# Patient Record
Sex: MALE | Race: WHITE | Employment: FULL TIME | URBAN - METROPOLITAN AREA
[De-identification: names, ages, dates, MRNs, and addresses within clinical notes are randomized per-mention and may not be internally consistent; named-entity substitution may affect disease eponyms.]

---

## 2017-02-02 ENCOUNTER — TELEPHONE (OUTPATIENT)
Dept: FAMILY MEDICINE CLINIC | Age: 42
End: 2017-02-02

## 2017-02-02 ENCOUNTER — OFFICE VISIT (OUTPATIENT)
Dept: FAMILY MEDICINE CLINIC | Age: 42
End: 2017-02-02

## 2017-02-02 VITALS
DIASTOLIC BLOOD PRESSURE: 91 MMHG | TEMPERATURE: 98.1 F | WEIGHT: 305 LBS | RESPIRATION RATE: 16 BRPM | OXYGEN SATURATION: 98 % | HEIGHT: 68 IN | SYSTOLIC BLOOD PRESSURE: 161 MMHG | BODY MASS INDEX: 46.23 KG/M2 | HEART RATE: 92 BPM

## 2017-02-02 DIAGNOSIS — R06.83 SNORING: ICD-10-CM

## 2017-02-02 DIAGNOSIS — I10 ESSENTIAL HYPERTENSION: Primary | ICD-10-CM

## 2017-02-02 DIAGNOSIS — Z13.220 SCREENING FOR HYPERLIPIDEMIA: ICD-10-CM

## 2017-02-02 DIAGNOSIS — Z13.6 SCREENING FOR HYPERTENSION: ICD-10-CM

## 2017-02-02 DIAGNOSIS — J30.9 ALLERGIC SINUSITIS: ICD-10-CM

## 2017-02-02 DIAGNOSIS — E66.01 OBESITY, MORBID, BMI 40.0-49.9 (HCC): ICD-10-CM

## 2017-02-02 DIAGNOSIS — E11.65 TYPE 2 DIABETES MELLITUS WITH HYPERGLYCEMIA, WITHOUT LONG-TERM CURRENT USE OF INSULIN (HCC): ICD-10-CM

## 2017-02-02 DIAGNOSIS — R35.0 URINARY FREQUENCY: ICD-10-CM

## 2017-02-02 DIAGNOSIS — Z13.1 SCREENING FOR DIABETES MELLITUS: ICD-10-CM

## 2017-02-02 LAB — GLUCOSE POC: 210 MG/DL

## 2017-02-02 RX ORDER — AMLODIPINE BESYLATE 5 MG/1
5 TABLET ORAL DAILY
Qty: 30 TAB | Refills: 3 | Status: SHIPPED | OUTPATIENT
Start: 2017-02-02 | End: 2017-02-02 | Stop reason: SDUPTHER

## 2017-02-02 RX ORDER — FLUTICASONE PROPIONATE 50 MCG
2 SPRAY, SUSPENSION (ML) NASAL DAILY
Qty: 1 BOTTLE | Refills: 3 | Status: SHIPPED | OUTPATIENT
Start: 2017-02-02 | End: 2017-02-03 | Stop reason: SDUPTHER

## 2017-02-02 RX ORDER — FLUTICASONE PROPIONATE 50 MCG
2 SPRAY, SUSPENSION (ML) NASAL DAILY
Qty: 1 BOTTLE | Refills: 3 | Status: SHIPPED | OUTPATIENT
Start: 2017-02-02 | End: 2017-02-02 | Stop reason: SDUPTHER

## 2017-02-02 RX ORDER — AMLODIPINE BESYLATE 5 MG/1
5 TABLET ORAL DAILY
Qty: 30 TAB | Refills: 3 | Status: SHIPPED | OUTPATIENT
Start: 2017-02-02 | End: 2017-02-03 | Stop reason: SDUPTHER

## 2017-02-02 RX ORDER — LANCETS
EACH MISCELLANEOUS
Qty: 100 EACH | Refills: 3 | Status: SHIPPED | OUTPATIENT
Start: 2017-02-02 | End: 2017-02-03 | Stop reason: SDUPTHER

## 2017-02-02 RX ORDER — INSULIN PUMP SYRINGE, 3 ML
EACH MISCELLANEOUS
Qty: 1 KIT | Refills: 0 | Status: SHIPPED | OUTPATIENT
Start: 2017-02-02 | End: 2017-02-03 | Stop reason: SDUPTHER

## 2017-02-02 RX ORDER — METFORMIN HYDROCHLORIDE 500 MG/1
500 TABLET ORAL 2 TIMES DAILY WITH MEALS
Qty: 60 TAB | Refills: 6 | Status: SHIPPED | OUTPATIENT
Start: 2017-02-02 | End: 2017-02-03 | Stop reason: SDUPTHER

## 2017-02-02 RX ORDER — TOPIRAMATE 25 MG/1
25 TABLET ORAL
Qty: 30 TAB | Refills: 3 | Status: SHIPPED | OUTPATIENT
Start: 2017-02-02 | End: 2017-02-03 | Stop reason: SDUPTHER

## 2017-02-02 RX ORDER — TOPIRAMATE 25 MG/1
25 TABLET ORAL
Qty: 30 TAB | Refills: 3 | Status: SHIPPED | OUTPATIENT
Start: 2017-02-02 | End: 2017-02-02 | Stop reason: SDUPTHER

## 2017-02-02 NOTE — TELEPHONE ENCOUNTER
The patient states that the RX for his medication was sent to the wrong pharmacy; it was sent to the [address] [place] [address]; the Rx for the medication needs to go to the [facility] in [place]; thank you

## 2017-02-02 NOTE — MR AVS SNAPSHOT
Visit Information Date & Time Provider Department Dept. Phone Encounter #  
 2/2/2017  2:15 PM Zen Bai MD [address] [place] [address] [id number] Upcoming Health Maintenance Date Due INFLUENZA AGE 9 TO ADULT 8/1/2016 DTaP/Tdap/Td series (2 - Td) 3/26/2020 Allergies as of 2/2/2017  Review Complete On: 2/2/2017 By: Zen Bai MD  
  
 Severity Noted Reaction Type Reactions Ativan [Lorazepam]  03/04/2013    Palpitations Avelox [Moxifloxacin]  03/04/2013    Palpitations Current Immunizations  Reviewed on 7/24/2014 Name Date Influenza Vaccine Split 12/1/2010 TD Vaccine 3/26/1999 TDAP Vaccine 3/26/2010 Not reviewed this visit You Were Diagnosed With   
  
 Codes Comments Essential hypertension    -  Primary ICD-10-CM: I10 
ICD-9-CM: 401.9 Screening for hypertension     ICD-10-CM: Z13.6 ICD-9-CM: V81.1 Allergic sinusitis     ICD-10-CM: J30.9 ICD-9-CM: 477.9 Urinary frequency     ICD-10-CM: R35.0 ICD-9-CM: 788.41 Screening for diabetes mellitus     ICD-10-CM: Z13.1 ICD-9-CM: V77.1 Obesity, morbid, BMI 40.0-49.9 (HCC)     ICD-10-CM: E66.01 
ICD-9-CM: 278.01 Snoring     ICD-10-CM: R06.83 
ICD-9-CM: 786.09 Screening for hyperlipidemia     ICD-10-CM: Z13.220 ICD-9-CM: V77.91 Vitals BP Pulse Temp Resp Height(growth percentile) Weight(growth percentile) (!) 161/91 (BP 1 Location: Left arm, BP Patient Position: Sitting) 92 98.1 °F (36.7 °C) (Oral) 16 5' 8\" (1.727 m) 305 lb (138.3 kg) SpO2 BMI Smoking Status 98% 46.38 kg/m2 Never Smoker BMI and BSA Data Body Mass Index Body Surface Area  
 46.38 kg/m 2 2.58 m 2 Preferred Pharmacy Pharmacy Name Phone [facility] [address], [address] [phone] Your Updated Medication List  
  
   
 This list is accurate as of: 17  2:55 PM.  Always use your most recent med list. amLODIPine 5 mg tablet Commonly known as:  Dionicio Rodriguez Take 1 Tab by mouth daily. aspirin 81 mg tablet Take 81 mg by mouth.  
  
 citalopram 10 mg tablet Commonly known as:  Arleth Bio Take 1 tablet by mouth daily. clonazePAM 0.5 mg tablet Commonly known as:  Lyndol Gallatin Take 1 tablet by mouth two (2) times daily as needed. fluticasone 50 mcg/actuation nasal spray Commonly known as:  Izzy Fetch 2 Sprays by Both Nostrils route daily. ibuprofen 200 mg Cap Take  by mouth. Neomycin-Polymyxin-Pramoxine 3.5-10,000-10 mg-unit-mg/gram topical cream  
Commonly known as:  NEOSPORIN + PAIN RELIEF Apply  to affected area two (2) times a day. predniSONE 10 mg dose pack Commonly known as:  STERAPRED DS Take as directed. topiramate 25 mg tablet Commonly known as:  TOPAMAX Take 1 Tab by mouth daily (with dinner). TYLENOL 325 mg tablet Generic drug:  acetaminophen Take  by mouth every four (4) hours as needed for Pain. Prescriptions Sent to Pharmacy Refills  
 amLODIPine (NORVASC) 5 mg tablet 3 Sig: Take 1 Tab by mouth daily. Class: Normal  
 Pharmacy: [address], [address] Ph #: [phone] Route: Oral  
 fluticasone (FLONASE) 50 mcg/actuation nasal spray 3 Si Sprays by Both Nostrils route daily. Class: Normal  
 Pharmacy: [address], [address] Ph #: [phone] Route: Both Nostrils  
 topiramate (TOPAMAX) 25 mg tablet 3 Sig: Take 1 Tab by mouth daily (with dinner). Class: Normal  
 Pharmacy: [place] [address] [facility], [address] Ph #: [phone] Route: Oral  
  
We Performed the Following AMB POC GLUCOSE BLOOD, BY GLUCOSE MONITORING DEVICE [29610 CPT(R)] HEMOGLOBIN A1C WITH EAG [70622 CPT(R)] LIPID PANEL [48007 CPT(R)] METABOLIC PANEL, COMPREHENSIVE [41481 CPT(R)] SLEEP MEDICINE REFERRAL [AYV471 Custom] Comments:  
 eval and treat for sleep apnea, very large tongue, very large neck, malampatti 4 URINALYSIS W/ RFLX MICROSCOPIC [49002 CPT(R)] Referral Information Referral ID Referred By Referred To  
  
 [id number] [place], [address] [place] [address], [address] [facility] Lesley Jansen 33 Phone: [phone] Fax: [phone] Visits Status Start Date End Date 1 New Request 2/2/17 2/2/18 If your referral has a status of pending review or denied, additional information will be sent to support the outcome of this decision. Patient Instructions Sleep Apnea: Care Instructions Your Care Instructions Sleep apnea means that you frequently stop breathing for 10 seconds or longer during sleep. It can be mild to severe, based on the number of times an hour that you stop breathing or have slowed breathing. Blocked or narrowed airways in your nose, mouth, or throat can cause sleep apnea. Your airway can become blocked when your throat muscles and tongue relax during sleep. You can treat sleep apnea at home by making lifestyle changes. You also can use a CPAP breathing machine that keeps tissues in the throat from blocking your airway. Or your doctor may suggest that you use a breathing device while you sleep. It helps keep your airway open. This could be a device that you put in your mouth. Other examples include strips or disks that you use on your nose. In some cases, surgery may be needed to remove enlarged tissues in the throat. Follow-up care is a key part of your treatment and safety. Be sure to make and go to all appointments, and call your doctor if you are having problems.  It's also a good idea to know your test results and keep a list of the medicines you take. How can you care for yourself at home? · Lose weight, if needed. It may reduce the number of times you stop breathing or have slowed breathing. · Sleep on your side. It may stop mild apnea. If you tend to roll onto your back, sew a pocket in the back of your pajama top. Put a tennis ball into the pocket, and stitch the pocket shut. This will help keep you from sleeping on your back. · Avoid alcohol and medicines such as sleeping pills and sedatives before bed. · Do not smoke. Smoking can make sleep apnea worse. If you need help quitting, talk to your doctor about stop-smoking programs and medicines. These can increase your chances of quitting for good. · Prop up the head of your bed 4 to 6 inches by putting bricks under the legs of the bed. · Treat breathing problems, such as a stuffy nose, caused by a cold or allergies. · Try a continuous positive airway pressure (CPAP) breathing machine if your doctor recommends it. The machine keeps your airway open when you sleep. · If CPAP does not work for you, ask your doctor if you can try other breathing machines. A bilevel positive airway pressure machine uses one type of air pressure for breathing in and another type for breathing out. Another device raises or lowers air pressure as needed while you breathe. · Talk to your doctor if: 
¨ Your nose feels dry or bleeds when you use one of these machines. You may need to increase moisture in the air. A humidifier may help. ¨ Your nose is runny or stuffy from using a breathing machine. Decongestants or a corticosteroid nasal spray may help. ¨ You are sleepy during the day and it gets in the way of the normal things you do. Do not drive when you are drowsy. When should you call for help? Watch closely for changes in your health, and be sure to contact your doctor if: 
· You still have sleep apnea even though you have made lifestyle changes. · You are thinking of trying a device such as CPAP. · You are having problems using a CPAP or similar machine. Where can you learn more? Go to http://donald-liana.info/. Enter W325 in the search box to learn more about \"Sleep Apnea: Care Instructions. \" Current as of: May 23, 2016 Content Version: 11.1 © 1500-3109 [employer]. Care instructions adapted under license by [employer] (which disclaims liability or warranty for this information). If you have questions about a medical condition or this instruction, always ask your healthcare professional. [place] [address] any warranty or liability for your use of this information. Introducing [facility]! Dear Jere Williamson: 
Thank you for requesting a [employer] account. Our records indicate that you already have an active [employer] account. You can access your account anytime at [email or web address] Did you know that you can access your hospital and ER discharge instructions at any time in [employer]? You can also review all of your test results from your hospital stay or ER visit. Additional Information If you have questions, please visit the Frequently Asked Questions section of the [employer] website at [email or web address] Remember, [employer] is NOT to be used for urgent needs. For medical emergencies, dial 911. Now available from your iPhone and Android! Please provide this summary of care documentation to your next provider. Your primary care clinician is listed as Salazar Montejo. If you have any questions after today's visit, please call [phone].

## 2017-02-02 NOTE — PROGRESS NOTES
1. Have you been to the ER, urgent care clinic since your last visit? Hospitalized since your last visit? No    2. Have you seen or consulted any other health care providers outside of the [address] [place] [address] since your last visit? Include any pap smears or colon screening.  No     Chief Complaint   Patient presents with    Establish Care    Medication Refill    Weight Management    Knee Pain    Ear Pain     r/o infection    Urinary Frequency     r/o UTI    Depression    Anxiety

## 2017-02-02 NOTE — TELEPHONE ENCOUNTER
The patient still needs for the Rx's to go to the [facility] pharmacy in [place]; he does not want the Rx's to go to the [address] [place] [address]; thank you

## 2017-02-02 NOTE — PROGRESS NOTES
Chief Complaint   Patient presents with    Establish Care    Medication Refill    Weight Management    Knee Pain    Ear Pain     r/o infection    Urinary Frequency     r/o UTI    Depression    Anxiety     he is a 46y.o. year old male who presents for evalution. He c/o pain in the right ear, he had a recent cold. This started three weeks ago. He travels a lot for work. He eats on the road. He has also noted urinary frequency and is concerned abou a UTI  He snores very loud and wife says he pauses in his sleep      Reviewed PmHx, RxHx, FmHx, SocHx, AllgHx and updated and dated in the chart.     Patient Active Problem List    Diagnosis    Hearing loss    Vertigo    Pulsatile tinnitus    Anxiety    GERD (gastroesophageal reflux disease)       Nurse notes were reviewed and copied and are correct  Review of Systems - negative except as listed above in the HPI    Objective:     Vitals:    02/02/17 1410   BP: (!) 161/91   Pulse: 92   Resp: 16   Temp: 98.1 °F (36.7 °C)   TempSrc: Oral   SpO2: 98%   Weight: 305 lb (138.3 kg)   Height: 5' 8\" (1.727 m)     Physical Examination: General appearance - alert, well appearing, and in no distress  Mental status - alert, oriented to person, place, and time  Ears - bilateral TM's and external ear canals normal  Mouth - mucous membranes moist, pharynx normal without lesions, malampatti 4  Neck - supple, no significant adenopathy  Lymphatics - no palpable lymphadenopathy, no hepatosplenomegaly  Chest - clear to auscultation, no wheezes, rales or rhonchi, symmetric air entry  Heart - normal rate, regular rhythm, normal S1, S2, no murmurs, rubs, clicks or gallops  Abdomen - soft, nontender, nondistended, no masses or organomegaly  Musculoskeletal - no joint tenderness, deformity or swelling  Extremities - peripheral pulses normal, no pedal edema, no clubbing or cyanosis  Skin - normal coloration and turgor, no rashes, no suspicious skin lesions noted   Skin tags on the neck, hyperpigmented and thickened skin      Assessment/ Plan:   Keysha Pinzon was seen today for establish care, medication refill, weight management, knee pain, ear pain, urinary frequency, depression and anxiety. Diagnoses and all orders for this visit:    Essential hypertension  -     METABOLIC PANEL, COMPREHENSIVE  -     Discontinue: amLODIPine (NORVASC) 5 mg tablet; Take 1 Tab by mouth daily. -     amLODIPine (NORVASC) 5 mg tablet; Take 1 Tab by mouth daily. Screening for hypertension  -     HEMOGLOBIN A1C WITH EAG    Allergic sinusitis  -     Discontinue: fluticasone (FLONASE) 50 mcg/actuation nasal spray; 2 Sprays by Both Nostrils route daily. -     fluticasone (FLONASE) 50 mcg/actuation nasal spray; 2 Sprays by Both Nostrils route daily. Urinary frequency  -     URINALYSIS W/ RFLX MICROSCOPIC    Screening for diabetes mellitus  -     AMB POC GLUCOSE BLOOD, BY GLUCOSE MONITORING DEVICE    Obesity, morbid, BMI 40.0-49.9 ([facility])  -     Discontinue: topiramate (TOPAMAX) 25 mg tablet; Take 1 Tab by mouth daily (with dinner). -     topiramate (TOPAMAX) 25 mg tablet; Take 1 Tab by mouth daily (with dinner). Snoring  -     SLEEP MEDICINE REFERRAL    Screening for hyperlipidemia  -     LIPID PANEL    Type 2 diabetes mellitus with hyperglycemia, without long-term current use of insulin ([facility])  -     metFORMIN (GLUCOPHAGE) 500 mg tablet; Take 1 Tab by mouth two (2) times daily (with meals). -     Blood-Glucose Meter monitoring kit; E11.65  -     Lancets misc; E11.65 Check blood sugar once daily  -     glucose blood VI test strips (BLOOD GLUCOSE TEST) strip; E11.65 Check blood sugar daily     I counseled him on the importance of doing the exercise and changing his diet in order to get the blood sugar under control. He will start the metformin and come in 2 weeks to follow up. He will chck the blood sugar once daily  Follow-up Disposition:  Return in about 2 weeks (around 2/16/2017). ICD-10-CM ICD-9-CM    1. Essential hypertension Q38 402.9 METABOLIC PANEL, COMPREHENSIVE      amLODIPine (NORVASC) 5 mg tablet      DISCONTINUED: amLODIPine (NORVASC) 5 mg tablet   2. Screening for hypertension Z13.6 V81.1 HEMOGLOBIN A1C WITH EAG   3. Allergic sinusitis J30.9 477.9 fluticasone (FLONASE) 50 mcg/actuation nasal spray      DISCONTINUED: fluticasone (FLONASE) 50 mcg/actuation nasal spray   4. Urinary frequency R35.0 788.41 URINALYSIS W/ RFLX MICROSCOPIC   5. Screening for diabetes mellitus Z13.1 V77.1 AMB POC GLUCOSE BLOOD, BY GLUCOSE MONITORING DEVICE   6. Obesity, morbid, BMI 40.0-49.9 ([facility]) E66.01 278.01 topiramate (TOPAMAX) 25 mg tablet      DISCONTINUED: topiramate (TOPAMAX) 25 mg tablet   7. Snoring R06.83 786.09 SLEEP MEDICINE REFERRAL   8. Screening for hyperlipidemia Z13.220 V77.91 LIPID PANEL   9. Type 2 diabetes mellitus with hyperglycemia, without long-term current use of insulin ([facility]) E11.65 250.00 metFORMIN (GLUCOPHAGE) 500 mg tablet     790.29 Blood-Glucose Meter monitoring kit      Lancets misc      glucose blood VI test strips (BLOOD GLUCOSE TEST) strip       I have discussed the diagnosis with the patient and the intended plan as seen in the above orders. The patient has received an after-visit summary and questions were answered concerning future plans. Medication Side Effects and Warnings were discussed with patient: yes  Patient Labs were reviewed and or requested: yes  Patient Past Records were reviewed and or requested: yes        Patient Instructions        Sleep Apnea: Care Instructions  Your Care Instructions  Sleep apnea means that you frequently stop breathing for 10 seconds or longer during sleep. It can be mild to severe, based on the number of times an hour that you stop breathing or have slowed breathing. Blocked or narrowed airways in your nose, mouth, or throat can cause sleep apnea. Your airway can become blocked when your throat muscles and tongue relax during sleep.   You can treat sleep apnea at home by making lifestyle changes. You also can use a CPAP breathing machine that keeps tissues in the throat from blocking your airway. Or your doctor may suggest that you use a breathing device while you sleep. It helps keep your airway open. This could be a device that you put in your mouth. Other examples include strips or disks that you use on your nose. In some cases, surgery may be needed to remove enlarged tissues in the throat. Follow-up care is a key part of your treatment and safety. Be sure to make and go to all appointments, and call your doctor if you are having problems. It's also a good idea to know your test results and keep a list of the medicines you take. How can you care for yourself at home? · Lose weight, if needed. It may reduce the number of times you stop breathing or have slowed breathing. · Sleep on your side. It may stop mild apnea. If you tend to roll onto your back, sew a pocket in the back of your pajama top. Put a tennis ball into the pocket, and stitch the pocket shut. This will help keep you from sleeping on your back. · Avoid alcohol and medicines such as sleeping pills and sedatives before bed. · Do not smoke. Smoking can make sleep apnea worse. If you need help quitting, talk to your doctor about stop-smoking programs and medicines. These can increase your chances of quitting for good. · Prop up the head of your bed 4 to 6 inches by putting bricks under the legs of the bed. · Treat breathing problems, such as a stuffy nose, caused by a cold or allergies. · Try a continuous positive airway pressure (CPAP) breathing machine if your doctor recommends it. The machine keeps your airway open when you sleep. · If CPAP does not work for you, ask your doctor if you can try other breathing machines. A bilevel positive airway pressure machine uses one type of air pressure for breathing in and another type for breathing out.  Another device raises or lowers air pressure as needed while you breathe. · Talk to your doctor if:  ¨ Your nose feels dry or bleeds when you use one of these machines. You may need to increase moisture in the air. A humidifier may help. ¨ Your nose is runny or stuffy from using a breathing machine. Decongestants or a corticosteroid nasal spray may help. ¨ You are sleepy during the day and it gets in the way of the normal things you do. Do not drive when you are drowsy. When should you call for help? Watch closely for changes in your health, and be sure to contact your doctor if:  · You still have sleep apnea even though you have made lifestyle changes. · You are thinking of trying a device such as CPAP. · You are having problems using a CPAP or similar machine. Where can you learn more? Go to http://donald-liana.info/. Enter M210 in the search box to learn more about \"Sleep Apnea: Care Instructions. \"  Current as of: May 23, 2016  Content Version: 11.1  © 1216-3632 [employer]. Care instructions adapted under license by [employer] (which disclaims liability or warranty for this information). If you have questions about a medical condition or this instruction, always ask your healthcare professional. [place] [address] any warranty or liability for your use of this information.         The patient verbalizes understanding and agrees with the plan of care        Patient has the advanced directives booklet to review

## 2017-02-02 NOTE — PATIENT INSTRUCTIONS
Sleep Apnea: Care Instructions  Your Care Instructions  Sleep apnea means that you frequently stop breathing for 10 seconds or longer during sleep. It can be mild to severe, based on the number of times an hour that you stop breathing or have slowed breathing. Blocked or narrowed airways in your nose, mouth, or throat can cause sleep apnea. Your airway can become blocked when your throat muscles and tongue relax during sleep. You can treat sleep apnea at home by making lifestyle changes. You also can use a CPAP breathing machine that keeps tissues in the throat from blocking your airway. Or your doctor may suggest that you use a breathing device while you sleep. It helps keep your airway open. This could be a device that you put in your mouth. Other examples include strips or disks that you use on your nose. In some cases, surgery may be needed to remove enlarged tissues in the throat. Follow-up care is a key part of your treatment and safety. Be sure to make and go to all appointments, and call your doctor if you are having problems. It's also a good idea to know your test results and keep a list of the medicines you take. How can you care for yourself at home? · Lose weight, if needed. It may reduce the number of times you stop breathing or have slowed breathing. · Sleep on your side. It may stop mild apnea. If you tend to roll onto your back, sew a pocket in the back of your pajama top. Put a tennis ball into the pocket, and stitch the pocket shut. This will help keep you from sleeping on your back. · Avoid alcohol and medicines such as sleeping pills and sedatives before bed. · Do not smoke. Smoking can make sleep apnea worse. If you need help quitting, talk to your doctor about stop-smoking programs and medicines. These can increase your chances of quitting for good. · Prop up the head of your bed 4 to 6 inches by putting bricks under the legs of the bed.   · Treat breathing problems, such as a stuffy nose, caused by a cold or allergies. · Try a continuous positive airway pressure (CPAP) breathing machine if your doctor recommends it. The machine keeps your airway open when you sleep. · If CPAP does not work for you, ask your doctor if you can try other breathing machines. A bilevel positive airway pressure machine uses one type of air pressure for breathing in and another type for breathing out. Another device raises or lowers air pressure as needed while you breathe. · Talk to your doctor if:  ¨ Your nose feels dry or bleeds when you use one of these machines. You may need to increase moisture in the air. A humidifier may help. ¨ Your nose is runny or stuffy from using a breathing machine. Decongestants or a corticosteroid nasal spray may help. ¨ You are sleepy during the day and it gets in the way of the normal things you do. Do not drive when you are drowsy. When should you call for help? Watch closely for changes in your health, and be sure to contact your doctor if:  · You still have sleep apnea even though you have made lifestyle changes. · You are thinking of trying a device such as CPAP. · You are having problems using a CPAP or similar machine. Where can you learn more? Go to http://donald-liana.info/. Enter V397 in the search box to learn more about \"Sleep Apnea: Care Instructions. \"  Current as of: May 23, 2016  Content Version: 11.1  © 8215-6924 [employer]. Care instructions adapted under license by [employer] (which disclaims liability or warranty for this information). If you have questions about a medical condition or this instruction, always ask your healthcare professional. [place] [address] any warranty or liability for your use of this information.

## 2017-02-03 ENCOUNTER — TELEPHONE (OUTPATIENT)
Dept: FAMILY MEDICINE CLINIC | Age: 42
End: 2017-02-03

## 2017-02-03 DIAGNOSIS — E11.65 TYPE 2 DIABETES MELLITUS WITH HYPERGLYCEMIA, WITHOUT LONG-TERM CURRENT USE OF INSULIN (HCC): ICD-10-CM

## 2017-02-03 DIAGNOSIS — I10 ESSENTIAL HYPERTENSION: ICD-10-CM

## 2017-02-03 DIAGNOSIS — E66.01 OBESITY, MORBID, BMI 40.0-49.9 (HCC): ICD-10-CM

## 2017-02-03 DIAGNOSIS — J30.9 ALLERGIC SINUSITIS: ICD-10-CM

## 2017-02-03 LAB
ALBUMIN SERPL-MCNC: 4.6 G/DL (ref 3.5–5.5)
ALBUMIN/GLOB SERPL: 1.6 {RATIO} (ref 1.1–2.5)
ALP SERPL-CCNC: 109 IU/L (ref 39–117)
ALT SERPL-CCNC: 65 IU/L (ref 0–44)
APPEARANCE UR: CLEAR
AST SERPL-CCNC: 32 IU/L (ref 0–40)
BILIRUB SERPL-MCNC: 0.4 MG/DL (ref 0–1.2)
BILIRUB UR QL STRIP: NEGATIVE
BUN SERPL-MCNC: 15 MG/DL (ref 6–20)
BUN/CREAT SERPL: 16 (ref 8–19)
CALCIUM SERPL-MCNC: 9.2 MG/DL (ref 8.7–10.2)
CHLORIDE SERPL-SCNC: 101 MMOL/L (ref 96–106)
CHOLEST SERPL-MCNC: 157 MG/DL (ref 100–199)
CO2 SERPL-SCNC: 24 MMOL/L (ref 18–29)
COLOR UR: YELLOW
CREAT SERPL-MCNC: 0.95 MG/DL (ref 0.76–1.27)
EST. AVERAGE GLUCOSE BLD GHB EST-MCNC: 111 MG/DL
GLOBULIN SER CALC-MCNC: 2.9 G/DL (ref 1.5–4.5)
GLUCOSE SERPL-MCNC: 89 MG/DL (ref 65–99)
GLUCOSE UR QL: NEGATIVE
HBA1C MFR BLD: 5.5 % (ref 4.8–5.6)
HDLC SERPL-MCNC: 34 MG/DL
HGB UR QL STRIP: NEGATIVE
INTERPRETATION, 910389: NORMAL
KETONES UR QL STRIP: NEGATIVE
LDLC SERPL CALC-MCNC: 98 MG/DL (ref 0–99)
LEUKOCYTE ESTERASE UR QL STRIP: NEGATIVE
MICRO URNS: NORMAL
NITRITE UR QL STRIP: NEGATIVE
PH UR STRIP: 5.5 [PH] (ref 5–7.5)
POTASSIUM SERPL-SCNC: 4.1 MMOL/L (ref 3.5–5.2)
PROT SERPL-MCNC: 7.5 G/DL (ref 6–8.5)
PROT UR QL STRIP: NEGATIVE
SODIUM SERPL-SCNC: 141 MMOL/L (ref 134–144)
SP GR UR: 1.03 (ref 1–1.03)
TRIGL SERPL-MCNC: 125 MG/DL (ref 0–149)
UROBILINOGEN UR STRIP-MCNC: 0.2 MG/DL (ref 0.2–1)
VLDLC SERPL CALC-MCNC: 25 MG/DL (ref 5–40)

## 2017-02-03 RX ORDER — FLUTICASONE PROPIONATE 50 MCG
2 SPRAY, SUSPENSION (ML) NASAL DAILY
Qty: 1 BOTTLE | Refills: 3 | Status: SHIPPED | OUTPATIENT
Start: 2017-02-03 | End: 2019-03-25

## 2017-02-03 RX ORDER — METFORMIN HYDROCHLORIDE 500 MG/1
500 TABLET ORAL 2 TIMES DAILY WITH MEALS
Qty: 60 TAB | Refills: 6 | Status: SHIPPED | OUTPATIENT
Start: 2017-02-03 | End: 2017-08-08 | Stop reason: SDUPTHER

## 2017-02-03 RX ORDER — TOPIRAMATE 25 MG/1
25 TABLET ORAL
Qty: 30 TAB | Refills: 3 | Status: SHIPPED | OUTPATIENT
Start: 2017-02-03 | End: 2017-04-28 | Stop reason: SDUPTHER

## 2017-02-03 RX ORDER — AMLODIPINE BESYLATE 5 MG/1
5 TABLET ORAL DAILY
Qty: 30 TAB | Refills: 3 | Status: SHIPPED | OUTPATIENT
Start: 2017-02-03 | End: 2017-04-28 | Stop reason: SDUPTHER

## 2017-02-03 RX ORDER — LANCETS
EACH MISCELLANEOUS
Qty: 100 EACH | Refills: 3 | Status: SHIPPED | OUTPATIENT
Start: 2017-02-03 | End: 2019-03-25

## 2017-02-03 RX ORDER — INSULIN PUMP SYRINGE, 3 ML
EACH MISCELLANEOUS
Qty: 1 KIT | Refills: 0 | Status: SHIPPED | OUTPATIENT
Start: 2017-02-03 | End: 2019-03-25

## 2017-02-06 ENCOUNTER — TELEPHONE (OUTPATIENT)
Dept: FAMILY MEDICINE CLINIC | Age: 42
End: 2017-02-06

## 2017-02-06 DIAGNOSIS — J32.9 OTHER SINUSITIS: Primary | ICD-10-CM

## 2017-02-06 RX ORDER — AMOXICILLIN 500 MG/1
500 CAPSULE ORAL 2 TIMES DAILY
Qty: 20 CAP | Refills: 0 | Status: SHIPPED | OUTPATIENT
Start: 2017-02-06 | End: 2017-02-16

## 2017-02-06 NOTE — TELEPHONE ENCOUNTER
Spoke with patient and he states that that he is still having greenish/ white mucus draining from his nose. He is requesting an antibiotic for this. Please advise.

## 2017-02-06 NOTE — PROGRESS NOTES
The liver is improved. This is likely to keep improving as you eat better and move more.  i want to recheck in 1 month

## 2017-02-06 NOTE — TELEPHONE ENCOUNTER
rx sent to pharmacy for amox 50 mg bid for 10 days. The dizziness prob associated with upper resp infection.

## 2017-02-06 NOTE — PROGRESS NOTES
Spoke with patient and advised of lab results. Patient verbalized understanding and had no questions at this time.

## 2017-02-06 NOTE — TELEPHONE ENCOUNTER
Patient's wife if calling back and wanting to speak to a nurse again about his headaches/dizziness from a poss side effect from a medication?     Please advise

## 2017-02-07 ENCOUNTER — HOSPITAL ENCOUNTER (OUTPATIENT)
Dept: SLEEP MEDICINE | Age: 42
Discharge: HOME | End: 2017-02-07
Payer: COMMERCIAL

## 2017-02-07 ENCOUNTER — OFFICE VISIT (OUTPATIENT)
Dept: SLEEP MEDICINE | Age: 42
End: 2017-02-07

## 2017-02-07 VITALS
OXYGEN SATURATION: 96 % | HEIGHT: 68 IN | WEIGHT: 306 LBS | BODY MASS INDEX: 46.38 KG/M2 | HEART RATE: 88 BPM | DIASTOLIC BLOOD PRESSURE: 86 MMHG | SYSTOLIC BLOOD PRESSURE: 141 MMHG

## 2017-02-07 DIAGNOSIS — E11.9 CONTROLLED TYPE 2 DIABETES MELLITUS WITHOUT COMPLICATION, WITHOUT LONG-TERM CURRENT USE OF INSULIN (HCC): ICD-10-CM

## 2017-02-07 DIAGNOSIS — I10 ESSENTIAL HYPERTENSION: ICD-10-CM

## 2017-02-07 DIAGNOSIS — G47.33 OBSTRUCTIVE SLEEP APNEA (ADULT) (PEDIATRIC): Primary | ICD-10-CM

## 2017-02-07 PROCEDURE — 95806 SLEEP STUDY UNATT&RESP EFFT: CPT | Performed by: INTERNAL MEDICINE

## 2017-02-07 NOTE — PATIENT INSTRUCTIONS
217 [facility] [place]. [place], [address]  Tel.  [phone]  Fax. 100 [facility] 60  [place], [address] [facility]  Tel.  [phone]  Fax. [phone] [address]  Tel.  [phone]  Fax. [phone]     Sleep Apnea: After Your Visit  Your Care Instructions  Sleep apnea occurs when you frequently stop breathing for 10 seconds or longer during sleep. It can be mild to severe, based on the number of times per hour that you stop breathing or have slowed breathing. Blocked or narrowed airways in your nose, mouth, or throat can cause sleep apnea. Your airway can become blocked when your throat muscles and tongue relax during sleep. Sleep apnea is common, occurring in 1 out of 20 individuals. Individuals having any of the following characteristics should be evaluated and treated right away due to high risk and detrimental consequences from untreated sleep apnea:  1. Obesity  2. Congestive Heart failure  3. Atrial Fibrillation  4. Uncontrolled Hypertension  5. Type II Diabetes  6. Night-time Arrhythmias  7. Stroke  8. Pulmonary Hypertension  9. High-risk Driving Populations (pilots, truck drivers, etc.)  10. Patients Considering Weight-loss Surgery    How do you know you have sleep apnea? You probably have sleep apnea if you answer 'yes' to 3 or more of the following questions:  S - Have you been told that you Snore? T - Are you often Tired during the day? O - Has anyone Observed you stop breathing while sleeping? P- Do you have (or are being treated for) high blood Pressure? B - Are you obese (Body Mass Index > 35)? A - Is your Age 48years old or older? N - Is your Neck size greater than 16 inches? G - Are you male Gender? A sleep physician can prescribe a breathing device that prevents tissues in the throat from blocking your airway.  Or your doctor may recommend using a dental device (oral breathing device) to help keep your airway open. In some cases, surgery may be needed to remove enlarged tissues in the throat. Follow-up care is a key part of your treatment and safety. Be sure to make and go to all appointments, and call your doctor if you are having problems. It's also a good idea to know your test results and keep a list of the medicines you take. How can you care for yourself at home? · Lose weight, if needed. It may reduce the number of times you stop breathing or have slowed breathing. · Go to bed at the same time every night. · Sleep on your side. It may stop mild apnea. If you tend to roll onto your back, sew a pocket in the back of your pajama top. Put a tennis ball into the pocket, and stitch the pocket shut. This will help keep you from sleeping on your back. · Avoid alcohol and medicines such as sleeping pills and sedatives before bed. · Do not smoke. Smoking can make sleep apnea worse. If you need help quitting, talk to your doctor about stop-smoking programs and medicines. These can increase your chances of quitting for good. · Prop up the head of your bed 4 to 6 inches by putting bricks under the legs of the bed. · Treat breathing problems, such as a stuffy nose, caused by a cold or allergies. · Use a continuous positive airway pressure (CPAP) breathing machine if lifestyle changes do not help your apnea and your doctor recommends it. The machine keeps your airway from closing when you sleep. · If CPAP does not help you, ask your doctor whether you should try other breathing machines. A bilevel positive airway pressure machine has two types of air pressureâone for breathing in and one for breathing out. Another device raises or lowers air pressure as needed while you breathe. · If your nose feels dry or bleeds when using one of these machines, talk with your doctor about increasing moisture in the air. A humidifier may help.   · If your nose is runny or stuffy from using a breathing machine, talk with your doctor about using decongestants or a corticosteroid nasal spray. When should you call for help? Watch closely for changes in your health, and be sure to contact your doctor if:  · You still have sleep apnea even though you have made lifestyle changes. · You are thinking of trying a device such as CPAP. · You are having problems using a CPAP or similar machine. Where can you learn more? Go to Edgeio. Enter G436 in the search box to learn more about \"Sleep Apnea: After Your Visit. \"   © 9287-8738 Healthwise, Incorporated. Care instructions adapted under license by [employer] (which disclaims liability or warranty for this information). This care instruction is for use with your licensed healthcare professional. If you have questions about a medical condition or this instruction, always ask your healthcare professional. Duey Lorenzo any warranty or liability for your use of this information. PROPER SLEEP HYGIENE    What to avoid  · Do not have drinks with caffeine, such as coffee or black tea, for 8 hours before bed. · Do not smoke or use other types of tobacco near bedtime. Nicotine is a stimulant and can keep you awake. · Avoid drinking alcohol late in the evening, because it can cause you to wake in the middle of the night. · Do not eat a big meal close to bedtime. If you are hungry, eat a light snack. · Do not drink a lot of water close to bedtime, because the need to urinate may wake you up during the night. · Do not read or watch TV in bed. Use the bed only for sleeping and sexual activity. What to try  · Go to bed at the same time every night, and wake up at the same time every morning. Do not take naps during the day. · Keep your bedroom quiet, dark, and cool. · Get regular exercise, but not within 3 to 4 hours of your bedtime. .  · Sleep on a comfortable pillow and mattress.   · If watching the clock makes you anxious, turn it facing away from you so you cannot see the time. · If you worry when you lie down, start a worry book. Well before bedtime, write down your worries, and then set the book and your concerns aside. · Try meditation or other relaxation techniques before you go to bed. · If you cannot fall asleep, get up and go to another room until you feel sleepy. Do something relaxing. Repeat your bedtime routine before you go to bed again. · Make your house quiet and calm about an hour before bedtime. Turn down the lights, turn off the TV, log off the computer, and turn down the volume on music. This can help you relax after a busy day. Drowsy Driving  The [address] [place] [address] Road Traffic Safety Administration cites drowsiness as a causing factor in more than 032,835 police reported crashes annually, resulting in 76,000 injuries and 1,500 deaths. Other surveys suggest 55% of people polled have driven while drowsy in the past year, 23% had fallen asleep but not crashed, 3% crashed, and 2% had and accident due to drowsy driving. Who is at risk? Young Drivers: One study of drowsy driving accidents states that 55% of the drivers were under 25 years. Of those, 75% were male. Shift Workers and Travelers: People who work overnight or travel across time zones frequently are at higher risk of experiencing Circadian Rhythm Disorders. They are trying to work and function when their body is programed to sleep. Sleep Deprived: Lack of sleep has a serious impact on your ability to pay attention or focus on a task. Consistently getting less than the average of 8 hours your body needs creates partial or cumulative sleep deprivation. Untreated Sleep Disorders: Sleep Apnea, Narcolepsy, R.L.S., and other sleep disorders (untreated) prevent a person from getting enough restful sleep. This leads to excessive daytime sleepiness and increases the risk for drowsy driving accidents by up to 7 times.   Medications / Alcohol: Even over the counter medications can cause drowsiness. Medications that impair a drivers attention should have a warning label. Alcohol naturally makes you sleepy and on its own can cause accidents. Combined with excessive drowsiness its effects are amplified. Signs of Drowsy Driving:   * You don't remember driving the last few miles   * You may drift out of your ángel   * You are unable to focus and your thoughts wander   * You may yawn more often than normal   * You have difficulty keeping your eyes open / nodding off   * Missing traffic signs, speeding, or tailgating  Prevention-   Good sleep hygiene, lifestyle and behavioral choices have the most impact on drowsy driving. There is no substitute for sleep and the average person requires 8 hours nightly. If you find yourself driving drowsy, stop and sleep. Consider the sleep hygiene tips provided during your visit as well. Medication Refill Policy: Refills for all medications require 1 week advance notice. Please have your pharmacy fax a refill request. We are unable to fax, or call in \"controled substance\" medications and you will need to pick these prescriptions up from our office. SnowShoe Stamp Activation    Thank you for requesting access to SnowShoe Stamp. Please follow the instructions below to securely access and download your online medical record. SnowShoe Stamp allows you to send messages to your doctor, view your test results, renew your prescriptions, schedule appointments, and more. How Do I Sign Up? 1. In your internet browser, go to [email or web address]. 2. Click on the First Time User? Click Here link in the Sign In box. You will see the New Member Sign Up page. 3. Enter your SnowShoe Stamp Access Code exactly as it appears below. You will not need to use this code after youve completed the sign-up process. If you do not sign up before the expiration date, you must request a new code. SnowShoe Stamp Access Code:  Activation code not generated  Current SnowShoe Stamp Status: Active (This is the date your HighGround access code will )    4. Enter the last four digits of your Social Security Number (xxxx) and Date of Birth (mm/dd/yyyy) as indicated and click Submit. You will be taken to the next sign-up page. 5. Create a Century Labst ID. This will be your HighGround login ID and cannot be changed, so think of one that is secure and easy to remember. 6. Create a HighGround password. You can change your password at any time. 7. Enter your Password Reset Question and Answer. This can be used at a later time if you forget your password. 8. Enter your e-mail address. You will receive e-mail notification when new information is available in [address] 9. Click Sign Up. You can now view and download portions of your medical record. 10. Click the Download Summary menu link to download a portable copy of your medical information. Additional Information    If you have questions, please call [phone]. Remember, HighGround is NOT to be used for urgent needs. For medical emergencies, dial 911.

## 2017-02-07 NOTE — Clinical Note
Thank you for the referral.  I will keep you informed of his progress.  [address], [place]

## 2017-02-07 NOTE — PROGRESS NOTES
217 [facility] [place]. [place], [address]  Tel.  [phone]  Fax. 100 [facility] 60  [place], 200 [address] [facility]  Tel.  [phone]  Fax. [phone] [address]   Tel.  [phone]  Fax. [phone]         Subjective:      Jeri Douglas is an 46 y.o. male referred for evaluation for a sleep disorder. He complains of snoring, snorting, choking, periods of not breathing associated with excessive daytime sleepiness. Symptoms began several years ago, gradually worsening since that time. He usually can fall asleep in variable minutes. Family or house members note snoring, periods of not breathing. He denies falling asleep while at work, driving. Jeri Douglas does wake up frequently at night. He is bothered by waking up too early and left unable to get back to sleep. He actually sleeps about 5 hours at night and wakes up about 3 times during the night. He does work shifts:  First Shift;Second Shift. Dorantes Must indicates he does get too little sleep at night. His bedtime is 2300. He awakens at 0500. He does not take naps. . He has the following observed behaviors: Loud snoring, Pauses in breathing; Nightmares. Other remarks: waking with a gasp wakes with palpitations and headaches  He travels a lot with work as a   [place] Sleepiness Score: 17   which reflect moderate daytime drowsiness. Allergies   Allergen Reactions    Ativan [Lorazepam] Palpitations    Avelox [Moxifloxacin] Palpitations         Current Outpatient Prescriptions:     amoxicillin (AMOXIL) 500 mg capsule, Take 1 Cap by mouth two (2) times a day for 10 days. , Disp: 20 Cap, Rfl: 0    metFORMIN (GLUCOPHAGE) 500 mg tablet, Take 1 Tab by mouth two (2) times daily (with meals). , Disp: 60 Tab, Rfl: 6    Blood-Glucose Meter monitoring kit, E11.65, Disp: 1 Kit, Rfl: 0    Lancets misc, E11.65 Check blood sugar once daily, Disp: 100 Each, Rfl: 3    glucose blood VI test strips (BLOOD GLUCOSE TEST) strip, E11.65 Check blood sugar daily, Disp: 100 Strip, Rfl: 3    amLODIPine (NORVASC) 5 mg tablet, Take 1 Tab by mouth daily. , Disp: 30 Tab, Rfl: 3    fluticasone (FLONASE) 50 mcg/actuation nasal spray, 2 Sprays by Both Nostrils route daily. , Disp: 1 Bottle, Rfl: 3    topiramate (TOPAMAX) 25 mg tablet, Take 1 Tab by mouth daily (with dinner). , Disp: 30 Tab, Rfl: 3    ibuprofen 200 mg cap, Take  by mouth., Disp: , Rfl:     aspirin 81 mg tablet, Take 81 mg by mouth., Disp: , Rfl:     predniSONE (STERAPRED DS) 10 mg dose pack, Take as directed., Disp: 21 Tab, Rfl: 0    acetaminophen (TYLENOL) 325 mg tablet, Take  by mouth every four (4) hours as needed for Pain., Disp: , Rfl:     Neomycin-Polymyxin-Pramoxine (NEOSPORIN + PAIN RELIEF) 3.5-10,000-10 mg-unit-mg/gram topical cream, Apply  to affected area two (2) times a day., Disp: 15 g, Rfl: 0    citalopram (CELEXA) 10 mg tablet, Take 1 tablet by mouth daily. , Disp: 30 tablet, Rfl: 5    clonazePAM (KLONOPIN) 0.5 mg tablet, Take 1 tablet by mouth two (2) times daily as needed. , Disp: 60 tablet, Rfl: 1     He  has a past medical history of Asthma; Bronchitis; Chronic pain; Depression; Gastrointestinal disorder; Otitis media; Sinus infection; and Vertigo (3/4/2013). He  has no past surgical history on file. He family history includes Cancer in his father, mother, and sister; Coronary Artery Disease in his father, paternal grandfather, paternal grandmother, and paternal uncle; Diabetes in his father and mother; Elevated Lipids in his father and mother; Heart Disease in his father; High Cholesterol in his brother and father; Hypertension in his brother, father, and mother; Kidney Disease in his mother. He  reports that he has never smoked. He has never used smokeless tobacco. He reports that he drinks alcohol. He reports that he does not use illicit drugs.      Review of Systems:  Constitutional: +weight gain. Eyes:  No blurred vision. CVS:  No significant chest pain  Pulm:  No significant shortness of breath  GI:  No significant nausea or vomiting  :  No significant nocturia  Musculoskeletal:  No significant joint pain at night  Skin:  No significant rashes  Neuro:  No significant dizziness   Psych:  No active mood issues    Sleep Review of Systems: notable for no difficulty falling asleep; +frequent awakenings at night;  regular dreaming noted; no nightmares ; + early morning headaches; no memory problems; no concentration issues; no history of any automobile or occupational accidents due to daytime drowsiness. Objective:     Visit Vitals    /86    Pulse 88    Ht 5' 8\" (1.727 m)    Wt 306 lb (138.8 kg)    SpO2 96%    BMI 46.53 kg/m2         General:   Not in acute distress   Eyes:  Anicteric sclerae, no obvious strabismus   Nose:  No obvious nasal septum deviation    Oropharynx:   Class 3 oropharyngeal outlet, thick tongue base, enlarged and boggy uvula, low-lying soft palate, narrow tonsilo-pharyngeal pilars   Tonsils:   tonsils are present and normal   Neck:   Neck circ. in \"inches\": 20; midline trachea   Chest/Lungs:  Equal lung expansion, clear on auscultation    CVS:  Normal rate, regular rhythm; no JVD   Skin:  Warm to touch; no obvious rashes   Neuro:  No focal deficits ; no obvious tremor    Psych:  Normal affect,  normal countenance;          Assessment:       ICD-10-CM ICD-9-CM    1. Obstructive sleep apnea (adult) (pediatric) G47.33 327.23 SLEEP STUDY UNATTENDED, 4 CHANNEL   2. Essential hypertension I10 401.9    3. Controlled type 2 diabetes mellitus without complication, without long-term current use of insulin ([facility]) E11.9 250.00          Plan:     * The patient currently has a High Risk for having sleep apnea. STOP-BANG score 7.  * PSG was ordered for initial evaluation.      * He was provided information on sleep apnea including coresponding risk factors and the importance of proper treatment. * Counseling was provided regarding proper sleep hygiene and safe driving. * I will call him with the results. Treatment options for sleep apnea were reviewed. he is not against a trial of PAP if found to have significant sleep apnea. He would like me to go ahead and place the order for the CPAP as soon as possible since he travels for prolonged periods due to work. He is looking forward to using a PAP device. 2. Hypertension - he continues on his current regimen. I have reviewed the relationship between hypertension as it relates to sleep-disordered breathing. 3. Type II diabetes - he continues on his current regimen. I have reviewed the relationship between sleep disordered breathing as it relates to diabetes. Thank you for allowing us to participate in your patient's medical care. We'll keep you updated on these investigations.     Kourtney Henning MD  Diplomate in Sleep Medicine  [facility]

## 2017-02-07 NOTE — PROGRESS NOTES
· Patient was educated on proper hookup and operation of the HST. · Instruction forms and documentation were reviewed and signed. · The patient demonstrated good understanding of the HST. · General information regarding operations and maintenance of the device was provided. · He was provided information on sleep apnea including coresponding risk factors and the importance of proper treatment. · Follow-up appointment was made to return the HST. He will be contacted once the results have been reviewed. · He was asked to contact our office for any problems regarding his home sleep test study.

## 2017-02-10 ENCOUNTER — TELEPHONE (OUTPATIENT)
Dept: SLEEP MEDICINE | Age: 42
End: 2017-02-10

## 2017-02-10 DIAGNOSIS — G47.33 OBSTRUCTIVE SLEEP APNEA (ADULT) (PEDIATRIC): Primary | ICD-10-CM

## 2017-02-10 NOTE — TELEPHONE ENCOUNTER
HSAT Returned    Date of Study: 2/8/2017    The following information was gathered from the patients study log:    · Lights off: 10:30 PM  · Estimated sleep onset: 11:00 PM    · Awakened a total of 3 times. · The patient felt they slept 7 hours. · Patient took nothing before starting the test.  · Sleep quality was same compared to a usual nights sleep.     Further information provided: N/A

## 2017-02-13 ENCOUNTER — DOCUMENTATION ONLY (OUTPATIENT)
Dept: SLEEP MEDICINE | Age: 42
End: 2017-02-13

## 2017-02-13 NOTE — PROGRESS NOTES
[facility]ed [facility], patient has been notified of contact information. Order marked as Urgent per patient travels and will leave at end of month and will not return until May. PAP adherence appointment was discussed with patient. Patient has declined to schedule with provider, due to travel. He will check to see if follow up is needed and if so will schedule once return from travel.

## 2017-02-13 NOTE — TELEPHONE ENCOUNTER
The results of his home sleep study were reviewed. The results were positive for significant sleep disordered breathing. Treatment options were reviewed in detail. he would like to proceed with PAP therapy. I have placed an order for APAP. Remote download in 2 and 4 weeks to gauge treatment response and adherence to therapy. All of his questions were addressed.  Downloads and calls by tech should be routed to me

## 2017-02-15 ENCOUNTER — OFFICE VISIT (OUTPATIENT)
Dept: FAMILY MEDICINE CLINIC | Age: 42
End: 2017-02-15

## 2017-02-15 VITALS
OXYGEN SATURATION: 95 % | BODY MASS INDEX: 45.65 KG/M2 | HEART RATE: 94 BPM | SYSTOLIC BLOOD PRESSURE: 116 MMHG | HEIGHT: 68 IN | DIASTOLIC BLOOD PRESSURE: 75 MMHG | TEMPERATURE: 98.1 F | WEIGHT: 301.2 LBS | RESPIRATION RATE: 18 BRPM

## 2017-02-15 DIAGNOSIS — R74.8 ELEVATED LIVER ENZYMES: ICD-10-CM

## 2017-02-15 DIAGNOSIS — R80.9 PROTEINURIA: ICD-10-CM

## 2017-02-15 DIAGNOSIS — E11.9 CONTROLLED TYPE 2 DIABETES MELLITUS WITHOUT COMPLICATION, WITHOUT LONG-TERM CURRENT USE OF INSULIN (HCC): Primary | ICD-10-CM

## 2017-02-15 DIAGNOSIS — N28.9 ABNORMAL KIDNEY FUNCTION: ICD-10-CM

## 2017-02-15 NOTE — MR AVS SNAPSHOT
Visit Information Date & Time Provider Department Dept. Phone Encounter #  
 2/15/2017  1:30 PM Nuno Mendiola MD [address] [place] [address] [id number] Upcoming Health Maintenance Date Due  
 FOOT EXAM Q1 10/15/1987 MICROALBUMIN Q1 10/15/1987 EYE EXAM RETINAL OR DILATED Q1 10/15/1987 Pneumococcal 19-64 Medium Risk (1 of 1 - PPSV23) 10/15/1996 INFLUENZA AGE 9 TO ADULT 8/1/2016 HEMOGLOBIN A1C Q6M 8/2/2017 LIPID PANEL Q1 2/2/2018 DTaP/Tdap/Td series (2 - Td) 3/26/2020 Allergies as of 2/15/2017  Review Complete On: 2/15/2017 By: Nuno Mendiola MD  
  
 Severity Noted Reaction Type Reactions Ativan [Lorazepam]  03/04/2013    Palpitations Avelox [Moxifloxacin]  03/04/2013    Palpitations Current Immunizations  Reviewed on 7/24/2014 Name Date Influenza Vaccine Split 12/1/2010 TD Vaccine 3/26/1999 TDAP Vaccine 3/26/2010 Not reviewed this visit You Were Diagnosed With   
  
 Codes Comments Controlled type 2 diabetes mellitus without complication, without long-term current use of insulin ([facility]    -  Primary ICD-10-CM: E11.9 ICD-9-CM: 250.00 Proteinuria     ICD-10-CM: R80.9 ICD-9-CM: 791.0 Elevated liver enzymes     ICD-10-CM: R74.8 ICD-9-CM: 790.5 Vitals BP Pulse Temp Resp Height(growth percentile) Weight(growth percentile) 116/75 94 98.1 °F (36.7 °C) (Oral) 18 5' 8\" (1.727 m) 301 lb 3.2 oz (136.6 kg) SpO2 BMI Smoking Status 95% 45.8 kg/m2 Never Smoker Vitals History BMI and BSA Data Body Mass Index Body Surface Area 45.8 kg/m 2 2.56 m 2 Preferred Pharmacy Pharmacy Name Phone [id number], [address] [phone] Your Updated Medication List  
  
   
This list is accurate as of: 2/15/17  2:23 PM.  Always use your most recent med list. amLODIPine 5 mg tablet Commonly known as:  Josie Garg Take 1 Tab by mouth daily. amoxicillin 500 mg capsule Commonly known as:  AMOXIL Take 1 Cap by mouth two (2) times a day for 10 days. aspirin 81 mg tablet Take 81 mg by mouth. Blood-Glucose Meter monitoring kit E11.65  
  
 citalopram 10 mg tablet Commonly known as:  Crestline Inoue Take 1 tablet by mouth daily. clonazePAM 0.5 mg tablet Commonly known as:  David Conway Take 1 tablet by mouth two (2) times daily as needed. fluticasone 50 mcg/actuation nasal spray Commonly known as:  Roge Guild 2 Sprays by Both Nostrils route daily. glucose blood VI test strips strip Commonly known as:  blood glucose test  
E11.65 Check blood sugar daily  
  
 ibuprofen 200 mg Cap Take  by mouth. Lancets Misc E11.65 Check blood sugar once daily  
  
 metFORMIN 500 mg tablet Commonly known as:  GLUCOPHAGE Take 1 Tab by mouth two (2) times daily (with meals). Neomycin-Polymyxin-Pramoxine 3.5-10,000-10 mg-unit-mg/gram topical cream  
Commonly known as:  NEOSPORIN + PAIN RELIEF Apply  to affected area two (2) times a day. predniSONE 10 mg dose pack Commonly known as:  STERAPRED DS Take as directed. topiramate 25 mg tablet Commonly known as:  TOPAMAX Take 1 Tab by mouth daily (with dinner). TYLENOL 325 mg tablet Generic drug:  acetaminophen Take  by mouth every four (4) hours as needed for Pain. We Performed the Following METABOLIC PANEL, COMPREHENSIVE [14943 CPT(R)] URINALYSIS W/ RFLX MICROSCOPIC [72856 CPT(R)] [facility]! Dear Dalton Yang: 
Thank you for requesting a GraphSQL account. Our records indicate that you already have an active GraphSQL account. You can access your account anytime at [email or web address] Did you know that you can access your hospital and ER discharge instructions at any time in GraphSQL?   You can also review all of your test results from your hospital stay or ER visit. Additional Information If you have questions, please visit the Frequently Asked Questions section of the LittleLives website at [email or web address] Remember, LittleLives is NOT to be used for urgent needs. For medical emergencies, dial 911. Now available from your iPhone and Android! Please provide this summary of care documentation to your next provider. Your primary care clinician is listed as Vianey Sim. If you have any questions after today's visit, please call [phone].

## 2017-02-15 NOTE — PROGRESS NOTES
1. Have you been to the ER, urgent care clinic since your last visit? Hospitalized since your last visit? No    2. Have you seen or consulted any other health care providers outside of the [address] [place] [address] since your last visit? Include any pap smears or colon screening.  No     Chief Complaint   Patient presents with    Weight Management     Body Weight: 301.2  Body Fat%: 37.2  Muscle Mass Weight: 45.8  Body Water Weight: 19.6  Basal Metabolic Rate: 7257  BMI: 45.80

## 2017-02-15 NOTE — PROGRESS NOTES
Weight Loss Progress Note: Initial Physician Visit      Tasha Carrillo is a 46 y.o. male with BMI   45.80 who is here for his Initial Evaluation for the medical bariatric care. CC: I want to be healthier    Weight History  Current weight 301.2 and BMI is Body mass index is 45.8 kg/(m^2). Goal weight 250  Highest weight 305  (See weight gain time line scanned into media section of chart)    Weight loss History  How many weight loss attempts have you had?  none  Which program were you most successful doing?  none    Significant Medical History    Have you ever taken appetite suppressants? no   If yes: Rx or OTC? If yes; Any negative side effects? Ever diagnosed with sleep apnea or put on CPAP tested posutive this week    Ever had bariatric surgery: no    Pregnant or planning on becoming pregnant w/in 6 months: no    If female:     Significant Psychosocial History   Any history of drug abuse or dependence: no  Current Major Lifestyle Changes: no  Any potential unsupportive people: no  Why are you starting a weight loss program now? Are you ready? yes    History of binge eating disorder or anorexia : no   If yes, are you currently being treated no    Social History  Social History   Substance Use Topics    Smoking status: Never Smoker    Smokeless tobacco: Never Used    Alcohol use Yes      Comment: rare     How many times a week do you eat out? 5-6    Do you drink any EtOH?  no   If so, how much? Do you have upcoming any travel in the next 6 weeks?  no   If so, what do you have planned?           Exercise  How many days a week do you currently exercise?  0 days  Have you ever been told by a physician not to exercise?  no      Objective  Visit Vitals    /75    Pulse 94    Temp 98.1 °F (36.7 °C) (Oral)    Resp 18    Ht 5' 8\" (1.727 m)    Wt 301 lb 3.2 oz (136.6 kg)    SpO2 95%    BMI 45.8 kg/m2       Waist Circumference: See Weight Management Doc Flowsheet  Neck Circumference: See Weight Management Doc Flowsheet  Percent Body Fat: See Weight Management Doc Flowsheet  Weight Metrics 2/15/2017 2/15/2017 2/7/2017 2/2/2017 4/25/2016 2/20/2015 1/29/2015   Weight - 301 lb 3.2 oz 306 lb 305 lb 298 lb 12.8 oz 303 lb 4 oz 296 lb   Neck Circ (inches) 21.5 - - - - - -   Waist Measure Inches 54.5 - - - - - -   Exercise Mins/week 0 - - - - - -   Body Fat % 37.2 - - - - - -   BMI - 45.8 kg/m2 46.53 kg/m2 46.38 kg/m2 45.44 kg/m2 46.12 kg/m2 45.02 kg/m2         Labs:     Review of Systems  Complete ROS negative except where noted above      Physical Exam    Vital Signs Reviewed  Weight Management Metrics Reviewed    Vital Signs Reviewed  Appearance: Obese, A&O x 3, NAD  HEENT:  NC/AT, EOMI, PERRL, No scleral icterus, malampatti score:  Skin:    Skin tags - no   Acanthosis Nigricans - no  Neck:  No nodes, thyromegaly no  Heart:  RRR without M/R/G  Lungs:  CTAB, no rhonchi, rales, or wheezes with good air exchange   Abdomen:  Non-tender, pos bowel sounds; hepatomegaly - no  Ext:  No C/C/E        Assessment & Plan  Kathy Javier was seen today for weight management. 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 Keep checking BG at least once a day at different times      There are no Patient Instructions on file for this visit.     Follow-up Disposition: Not on File    Over 50% of the 30 minutes face to face time with Peter Thompson consisted of counseling & coordinating and/or discussing treatment plans in reference to his obesity The primary encounter diagnosis was Controlled type 2 diabetes mellitus without complication, without long-term current use of insulin ([address] Diagnoses of Proteinuria and Elevated liver enzymes were also pertinent to this visit. He is a special case. His job keeps him away for months. He has a BP monitor. He will contact me by mychart and give bp result in 2 weeks. We will talk every 2 weeks until he gets in town in at least 3 months to recheck blood work.   He has developed diabetes now as well as ANA and needs weight loss very much

## 2017-02-16 LAB
ALBUMIN SERPL-MCNC: 4.8 G/DL (ref 3.5–5.5)
ALBUMIN/GLOB SERPL: 1.5 {RATIO} (ref 1.1–2.5)
ALP SERPL-CCNC: 99 IU/L (ref 39–117)
ALT SERPL-CCNC: 66 IU/L (ref 0–44)
AST SERPL-CCNC: 30 IU/L (ref 0–40)
BILIRUB SERPL-MCNC: 0.5 MG/DL (ref 0–1.2)
BUN SERPL-MCNC: 24 MG/DL (ref 6–20)
BUN/CREAT SERPL: 16 (ref 8–19)
CALCIUM SERPL-MCNC: 9 MG/DL (ref 8.7–10.2)
CHLORIDE SERPL-SCNC: 105 MMOL/L (ref 96–106)
CO2 SERPL-SCNC: 17 MMOL/L (ref 18–29)
CREAT SERPL-MCNC: 1.48 MG/DL (ref 0.76–1.27)
GLOBULIN SER CALC-MCNC: 3.1 G/DL (ref 1.5–4.5)
GLUCOSE SERPL-MCNC: 82 MG/DL (ref 65–99)
INTERPRETATION: NORMAL
POTASSIUM SERPL-SCNC: 4 MMOL/L (ref 3.5–5.2)
PROT SERPL-MCNC: 7.9 G/DL (ref 6–8.5)
SODIUM SERPL-SCNC: 142 MMOL/L (ref 134–144)

## 2017-02-17 NOTE — PROGRESS NOTES
The blood test confirmed that your liver is irritated. This is probably freom fatty infiltration. Before you get on the road I want to check an ultrasound of your liver  The kidney test was also slightly abnormal on this test but you also showed signs of being a little dehydrated. Drink at least 64 ounces of water a day the next 2-3 days and then come repeat the test again.

## 2017-02-20 NOTE — PROGRESS NOTES
Spoke with patient and advised of lab results. Patient verbalized understanding and had no questions at this time. Patient scheduled a NV for labs.

## 2017-02-22 ENCOUNTER — HOSPITAL ENCOUNTER (OUTPATIENT)
Dept: ULTRASOUND IMAGING | Age: 42
Discharge: HOME | End: 2017-02-22
Attending: FAMILY MEDICINE
Payer: COMMERCIAL

## 2017-02-22 DIAGNOSIS — R74.8 ELEVATED LIVER ENZYMES: ICD-10-CM

## 2017-02-22 PROCEDURE — 76705 ECHO EXAM OF ABDOMEN: CPT

## 2017-02-23 ENCOUNTER — CLINICAL SUPPORT (OUTPATIENT)
Dept: FAMILY MEDICINE CLINIC | Age: 42
End: 2017-02-23

## 2017-02-23 DIAGNOSIS — Z01.89 ENCOUNTER FOR LABORATORY TEST: Primary | ICD-10-CM

## 2017-02-23 NOTE — PROGRESS NOTES
Patient presents for lab draw ordered by:    Ordering Provider:  Sam Colvin Department/Practice:  [facility]  Phone:  [phone]  Date Ordered:  2/21/2017    The following labs were drawn and sent to [facility] lab at [facility] by Venkat Brown LPN:    CMP  Urinalysis  Urine Culture      The following tubes were sent:    Gold  ( 1) Urinalysis ( 1 )  Urine Culture ( 1 )

## 2017-02-24 LAB
ALBUMIN SERPL-MCNC: 4.7 G/DL (ref 3.5–5.5)
ALBUMIN/GLOB SERPL: 1.6 {RATIO} (ref 1.1–2.5)
ALP SERPL-CCNC: 96 IU/L (ref 39–117)
ALT SERPL-CCNC: 71 IU/L (ref 0–44)
APPEARANCE UR: CLEAR
AST SERPL-CCNC: 38 IU/L (ref 0–40)
BILIRUB SERPL-MCNC: 0.6 MG/DL (ref 0–1.2)
BILIRUB UR QL STRIP: NEGATIVE
BUN SERPL-MCNC: 22 MG/DL (ref 6–20)
BUN/CREAT SERPL: 20 (ref 8–19)
CALCIUM SERPL-MCNC: 9 MG/DL (ref 8.7–10.2)
CHLORIDE SERPL-SCNC: 103 MMOL/L (ref 96–106)
CO2 SERPL-SCNC: 18 MMOL/L (ref 18–29)
COLOR UR: YELLOW
CREAT SERPL-MCNC: 1.12 MG/DL (ref 0.76–1.27)
GLOBULIN SER CALC-MCNC: 2.9 G/DL (ref 1.5–4.5)
GLUCOSE SERPL-MCNC: 96 MG/DL (ref 65–99)
GLUCOSE UR QL: NEGATIVE
HGB UR QL STRIP: NEGATIVE
KETONES UR QL STRIP: NEGATIVE
LEUKOCYTE ESTERASE UR QL STRIP: NEGATIVE
MICRO URNS: NORMAL
NITRITE UR QL STRIP: NEGATIVE
PH UR STRIP: 6 [PH] (ref 5–7.5)
POTASSIUM SERPL-SCNC: 4 MMOL/L (ref 3.5–5.2)
PROT SERPL-MCNC: 7.6 G/DL (ref 6–8.5)
PROT UR QL STRIP: NEGATIVE
SODIUM SERPL-SCNC: 140 MMOL/L (ref 134–144)
SP GR UR: 1.02 (ref 1–1.03)
SPECIMEN STATUS REPORT, ROLRST: NORMAL
UROBILINOGEN UR STRIP-MCNC: 0.2 MG/DL (ref 0.2–1)

## 2017-02-27 ENCOUNTER — TELEPHONE (OUTPATIENT)
Dept: FAMILY MEDICINE CLINIC | Age: 42
End: 2017-02-27

## 2017-02-27 NOTE — TELEPHONE ENCOUNTER
----- Message from [employer] sent at 2/27/2017 12:21 PM EST -----  Regarding: Hiram/sosa  Pt is requesting his test and lab  results. Pts number is [phone].

## 2017-02-28 NOTE — TELEPHONE ENCOUNTER
Spoke with patient and advised of lab and US results. Patient verbalized understanding and had no questions at this time.

## 2017-03-01 NOTE — PROGRESS NOTES
The kidney test is normal now. The liver test was not. The US of the liver did show fatty liver. The changes we discussed , especially the exercise will help the liver recover. It is important to make the dietary changes as well  I want to repeat this ultrasound in 3-6 months.  If this is not improved I will want to refer you to a specialist

## 2017-04-25 ENCOUNTER — DOCUMENTATION ONLY (OUTPATIENT)
Dept: SLEEP MEDICINE | Age: 42
End: 2017-04-25

## 2017-04-25 NOTE — PROGRESS NOTES
Remote download completed. Patient is 76.7% compliant over the past 30 days. AHI and leak are within normal limits. Download available in media.

## 2017-04-26 ENCOUNTER — TELEPHONE (OUTPATIENT)
Dept: SLEEP MEDICINE | Age: 42
End: 2017-04-26

## 2017-04-26 NOTE — TELEPHONE ENCOUNTER
Called patient  He feels that PAP improves his sleep. 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 Download reviewed and all of his questions addressed. He is unable to come in for a follow-up now as he is out of town working for at least another month.

## 2017-04-28 DIAGNOSIS — I10 ESSENTIAL HYPERTENSION: ICD-10-CM

## 2017-04-28 DIAGNOSIS — E66.01 OBESITY, MORBID, BMI 40.0-49.9 (HCC): ICD-10-CM

## 2017-04-28 RX ORDER — AMLODIPINE BESYLATE 5 MG/1
TABLET ORAL
Qty: 30 TAB | Refills: 1 | Status: SHIPPED | OUTPATIENT
Start: 2017-04-28 | End: 2017-07-05 | Stop reason: SDUPTHER

## 2017-04-28 RX ORDER — TOPIRAMATE 25 MG/1
TABLET ORAL
Qty: 30 TAB | Refills: 1 | Status: SHIPPED | OUTPATIENT
Start: 2017-04-28 | End: 2017-07-05 | Stop reason: SDUPTHER

## 2017-05-22 ENCOUNTER — OFFICE VISIT (OUTPATIENT)
Dept: FAMILY MEDICINE CLINIC | Age: 42
End: 2017-05-22

## 2017-05-22 VITALS
SYSTOLIC BLOOD PRESSURE: 126 MMHG | HEART RATE: 68 BPM | WEIGHT: 258 LBS | BODY MASS INDEX: 39.1 KG/M2 | OXYGEN SATURATION: 97 % | RESPIRATION RATE: 18 BRPM | HEIGHT: 68 IN | DIASTOLIC BLOOD PRESSURE: 68 MMHG | TEMPERATURE: 97.9 F

## 2017-05-22 DIAGNOSIS — E11.9 CONTROLLED TYPE 2 DIABETES MELLITUS WITHOUT COMPLICATION, WITHOUT LONG-TERM CURRENT USE OF INSULIN (HCC): ICD-10-CM

## 2017-05-22 DIAGNOSIS — Z13.6 SCREENING, ISCHEMIC HEART DISEASE: ICD-10-CM

## 2017-05-22 DIAGNOSIS — E66.01 OBESITY, MORBID, BMI 40.0-49.9 (HCC): ICD-10-CM

## 2017-05-22 DIAGNOSIS — R94.31 ABNORMAL EKG: Primary | ICD-10-CM

## 2017-05-22 NOTE — MR AVS SNAPSHOT
Visit Information Date & Time Provider Department Dept. Phone Encounter #  
 5/22/2017 10:30 AM Kia Mcgowan MD [address] [place] [address] [id number] Upcoming Health Maintenance Date Due  
 FOOT EXAM Q1 10/15/1987 MICROALBUMIN Q1 10/15/1987 EYE EXAM RETINAL OR DILATED Q1 10/15/1987 Pneumococcal 19-64 Medium Risk (1 of 1 - PPSV23) 10/15/1996 INFLUENZA AGE 9 TO ADULT 8/1/2017 HEMOGLOBIN A1C Q6M 8/2/2017 LIPID PANEL Q1 2/2/2018 DTaP/Tdap/Td series (2 - Td) 3/26/2020 Allergies as of 5/22/2017  Review Complete On: 5/22/2017 By: Kia Mcgowan MD  
  
 Severity Noted Reaction Type Reactions Ativan [Lorazepam]  03/04/2013    Palpitations Avelox [Moxifloxacin]  03/04/2013    Palpitations Current Immunizations  Reviewed on 7/24/2014 Name Date Influenza Vaccine Split 12/1/2010 TD Vaccine 3/26/1999 TDAP Vaccine 3/26/2010 Not reviewed this visit You Were Diagnosed With   
  
 Codes Comments Abnormal EKG    -  Primary ICD-10-CM: R94.31 
ICD-9-CM: 794.31 Screening, ischemic heart disease     ICD-10-CM: Z13.6 ICD-9-CM: V81.0 Controlled type 2 diabetes mellitus without complication, without long-term current use of insulin ([facility]     ICD-10-CM: E11.9 ICD-9-CM: 250.00 Obesity, morbid, BMI 40.0-49.9 (HCC)     ICD-10-CM: E66.01 
ICD-9-CM: 278.01 Vitals BP Pulse Temp Resp Height(growth percentile) Weight(growth percentile) 126/68 68 97.9 °F (36.6 °C) (Oral) 18 5' 8\" (1.727 m) 258 lb (117 kg) SpO2 BMI Smoking Status 97% 39.23 kg/m2 Former Smoker Vitals History BMI and BSA Data Body Mass Index Body Surface Area  
 39.23 kg/m 2 2.37 m 2 Preferred Pharmacy Pharmacy Name Phone [id number], [address] [phone] Your Updated Medication List  
  
   
This list is accurate as of: 5/22/17 11:45 AM.  Always use your most recent med list. amLODIPine 5 mg tablet Commonly known as:  Lalito Nguyễn TAKE 1 TABLET BY MOUTH EVERY DAY  
  
 aspirin 81 mg tablet Take 81 mg by mouth. Blood-Glucose Meter monitoring kit E11.65  
  
 citalopram 10 mg tablet Commonly known as:  Naga Toro Take 1 tablet by mouth daily. clonazePAM 0.5 mg tablet Commonly known as:  Drake Nancelyn Take 1 tablet by mouth two (2) times daily as needed. fluticasone 50 mcg/actuation nasal spray Commonly known as:  Fredick Tipton 2 Sprays by Both Nostrils route daily. glucose blood VI test strips strip Commonly known as:  blood glucose test  
E11.65 Check blood sugar daily  
  
 ibuprofen 200 mg Cap Take  by mouth. Lancets Misc E11.65 Check blood sugar once daily  
  
 metFORMIN 500 mg tablet Commonly known as:  GLUCOPHAGE Take 1 Tab by mouth two (2) times daily (with meals). Neomycin-Polymyxin-Pramoxine 3.5-10,000-10 mg-unit-mg/gram topical cream  
Commonly known as:  NEOSPORIN + PAIN RELIEF Apply  to affected area two (2) times a day. topiramate 25 mg tablet Commonly known as:  TOPAMAX TAKE 1 TABLET BY MOUTH EVERY DAY WITH DINNER  
  
 TYLENOL 325 mg tablet Generic drug:  acetaminophen Take  by mouth every four (4) hours as needed for Pain. We Performed the Following AMB POC EKG ROUTINE W/ 12 LEADS, INTER & REP [66303 CPT(R)] REFERRAL TO CARDIOLOGY [PYX05 Custom] Comments:  
 Abnormal EKG, eval and treat and get clearance for VLCD for weight loss Referral Information Referral ID Referred By Referred To  
  
 [id number] Joshua Andrade MD   
   [address] [place] [address] Phone: [phone] Fax: [phone] Visits Status Start Date End Date 1 New Request 5/22/17 5/22/18  If your referral has a status of pending review or denied, additional information will be sent to support the outcome of this decision. Patient Instructions Learning About Diabetes and Your Teeth How does diabetes affect your teeth and gums? When you have diabetes, managing blood sugar levels and taking good care of your teeth and gums are both important. When blood sugar levels are high, there's a greater risk for: · Gum (periodontal) disease. · Tooth decay. · Fungal infections in the mouth, like thrush. · Dry mouth, or xerostomia (say \"alvaro-yue-STO-leeann-uh\"). The mouth needs saliva to neutralize the acids in your mouth. These acids can lead to gum disease and tooth decay. Keeping your blood sugar levels in your target range can help prevent problems with the teeth and gums. If you have any problems with your teeth or gums, see your dentist. 
How do you care for your teeth and gums when you have diabetes? · Brush your teeth twice a day. · Floss daily. Make sure to press the floss against your teeth and not your gums. · Check each day for areas where your gums might be red or painful. Be sure to let your dentist know of any sores in your mouth. · See your dentist regularly for professional cleaning of your teeth and to look for gum problems. Many dentists recommend getting checkups twice a year. Remind your dentist that you have diabetes before any work is done. · Don't smoke or use smokeless tobacco. Tobacco use with diabetes can lead to a greater risk of severe gum disease. If you need help quitting, talk to your doctor about stop-smoking programs and medicines. These can increase your chances of quitting for good. Follow-up care is a key part of your treatment and safety. Be sure to make and go to all appointments, and call your doctor if you are having problems. It's also a good idea to know your test results and keep a list of the medicines you take. Where can you learn more? Go to http://donald-liana.info/. Enter K780 in the search box to learn more about \"Learning About Diabetes and Your Teeth. \" 
Current as of: May 23, 2016 Content Version: 11.2 © 2848-0951 [employer]. Care instructions adapted under license by [employer] (which disclaims liability or warranty for this information). If you have questions about a medical condition or this instruction, always ask your healthcare professional. [address] any warranty or liability for your use of this information. Diabetes Foot Health: Care Instructions Your Care Instructions When you have diabetes, your feet need extra care and attention. Diabetes can damage the nerve endings and blood vessels in your feet, making you less likely to notice when your feet are injured. Diabetes also limits your body's ability to fight infection and get blood to areas that need it. If you get a minor foot injury, it could become an ulcer or a serious infection. With good foot care, you can prevent most of these problems. Caring for your feet can be quick and easy. Most of the care can be done when you are bathing or getting ready for bed. Follow-up care is a key part of your treatment and safety. Be sure to make and go to all appointments, and call your doctor if you are having problems. Its also a good idea to know your test results and keep a list of the medicines you take. How can you care for yourself at home? · Keep your blood sugar close to normal by watching what and how much you eat, monitoring blood sugar, taking medicines if prescribed, and getting regular exercise. · Do not smoke. Smoking affects blood flow and can make foot problems worse. If you need help quitting, talk to your doctor about stop-smoking programs and medicines. These can increase your chances of quitting for good. · Eat a diet that is low in fats. High fat intake can cause fat to build up in your blood vessels and decrease blood flow. · Inspect your feet daily for blisters, cuts, cracks, or sores. If you cannot see well, use a mirror or have someone help you. · Take care of your feet: 
[facility] AUTHORITY your feet every day. Use warm (not hot) water. Check the water temperature with your wrists or other part of your body, not your feet. ¨ Dry your feet well. Pat them dry. Do not rub the skin on your feet too hard. Dry well between your toes. If the skin on your feet stays moist, bacteria or a fungus can grow, which can lead to infection. ¨ Keep your skin soft. Use moisturizing skin cream to keep the skin on your feet soft and prevent calluses and cracks. But do not put the cream between your toes, and stop using any cream that causes a rash. ¨ Clean underneath your toenails carefully. Do not use a sharp object to clean underneath your toenails. Use the blunt end of a nail file or other rounded tool. ¨ Trim and file your toenails straight across to prevent ingrown toenails. Use a nail clipper, not scissors. Use an emery board to smooth the edges. · Change socks daily. Socks without seams are best, because seams often rub the feet. You can find socks for people with diabetes from specialty catalogs. · Look inside your shoes every day for things like gravel or torn linings, which could cause blisters or sores. · Buy shoes that fit well: 
¨ Look for shoes that have plenty of space around the toes. This helps prevent bunions and blisters. ¨ Try on shoes while wearing the kind of socks you will usually wear with the shoes. ¨ Avoid plastic shoes. They may rub your feet and cause blisters. Good shoes should be made of materials that are flexible and breathable, such as leather or cloth. ¨ Break in new shoes slowly by wearing them for no more than an hour a day for several days. Take extra time to check your feet for red areas, blisters, or other problems after you wear new shoes. · Do not go barefoot.  Do not wear sandals, and do not wear shoes with very thin soles. Thin soles are easy to puncture. They also do not protect your feet from hot pavement or cold weather. · Have your doctor check your feet during each visit. If you have a foot problem, see your doctor. Do not try to treat an early foot problem at home. Home remedies or treatments that you can buy without a prescription (such as corn removers) can be harmful. · Always get early treatment for foot problems. A minor irritation can lead to a major problem if not properly cared for early. When should you call for help? Call your doctor now or seek immediate medical care if: 
· You have a foot sore, an ulcer or break in the skin that is not healing after 4 days, bleeding corns or calluses, or an ingrown toenail. · You have blue or black areas, which can mean bruising or blood flow problems. · You have peeling skin or tiny blisters between your toes or cracking or oozing of the skin. · You have a fever for more than 24 hours and a foot sore. · You have new numbness or tingling in your feet that does not go away after you move your feet or change positions. · You have unexplained or unusual swelling of the foot or ankle. Watch closely for changes in your health, and be sure to contact your doctor if: 
· You cannot do proper foot care. Where can you learn more? Go to http://donald-liana.info/. Enter A739 in the search box to learn more about \"Diabetes Foot Health: Care Instructions. \" Current as of: May 23, 2016 Content Version: 11.2 © 1497-4501 [employer]. Care instructions adapted under license by [employer] (which disclaims liability or warranty for this information). If you have questions about a medical condition or this instruction, always ask your healthcare professional. [place] [address] any warranty or liability for your use of this information. Introducing [facility]! Dear Duyen Meeks: Thank you for requesting a [employer] account. Our records indicate that you already have an active [employer] account. You can access your account anytime at [email or web address]. [email or web address] Did you know that you can access your hospital and ER discharge instructions at any time in [employer]? You can also review all of your test results from your hospital stay or ER visit. Additional Information If you have questions, please visit the Frequently Asked Questions section of the [employer] website at [email or web address] Remember, [employer]-Work is NOT to be used for urgent needs. For medical emergencies, dial 911. Now available from your iPhone and Android! Please provide this summary of care documentation to your next provider. Your primary care clinician is listed as Maverick Eddy. If you have any questions after today's visit, please call [phone].

## 2017-05-22 NOTE — PROGRESS NOTES
[facility] Weight Loss Program Progress Note: Initial Physician Visit     Magaly aHrris is a 46 y.o. male who is here for medical screening for entering the [facility] Weight Loss Program.     CC: morbid Obesity    He has been doing the bariatrix plan with grocery food and lost 48 lbs over 11 weeks,   His wife is on the [facility] VLCD program and he wants to switch over    His blood sugars have been   His bp has been 136/80 was highest on a very stressful day      Weight History  I personally reviewed the [place] completed by patient and scanned into media section of chart. It includes duration of their obesity, maximum weight, goal weight and weight gain time line (timing), all of which give the context of their obesity AND a Family History of their obesity. Is their Weight Loss Goal entered in to Comments? 200  lbs    Weight loss History  I personally reviewed the Medical Screening Hassel Ahumada completed by the patient and scanned into media section of chart. It includes number of weight loss attempts, the weight loss program that patients was most successful using, and if they have any hx of anorectic medication use, including OTC supplements. This captures modifying factors. Significant Medical History  Past Medical History:   Diagnosis Date    Asthma     Bronchitis     Chronic pain     Depression     Gastrointestinal disorder     GERD    Otitis media     Sinus infection     Vertigo 3/4/2013       I personally reviewed the Medical Screening Hassel Ahumada completed by the patient and scanned into media section of chart. This allows me to assess associated symptoms that are significant in the assessment of the patient's obesity and the patient's Past Medical History.     Outpatient Prescriptions Marked as Taking for the 5/22/17 encounter (Office Visit) with Mary Jo Contreras MD   Medication Sig Dispense Refill    topiramate (TOPAMAX) 25 mg tablet TAKE 1 TABLET BY MOUTH EVERY DAY WITH DINNER 30 Tab 1    amLODIPine (NORVASC) 5 mg tablet TAKE 1 TABLET BY MOUTH EVERY DAY 30 Tab 1    metFORMIN (GLUCOPHAGE) 500 mg tablet Take 1 Tab by mouth two (2) times daily (with meals). 60 Tab 6    Blood-Glucose Meter monitoring kit E11.65 1 Kit 0    Lancets misc E11.65 Check blood sugar once daily 100 Each 3    glucose blood VI test strips (BLOOD GLUCOSE TEST) strip E11.65 Check blood sugar daily 100 Strip 3    fluticasone (FLONASE) 50 mcg/actuation nasal spray 2 Sprays by Both Nostrils route daily. 1 Bottle 3    ibuprofen 200 mg cap Take  by mouth.  acetaminophen (TYLENOL) 325 mg tablet Take  by mouth every four (4) hours as needed for Pain.  Neomycin-Polymyxin-Pramoxine (NEOSPORIN + PAIN RELIEF) 3.5-10,000-10 mg-unit-mg/gram topical cream Apply  to affected area two (2) times a day. 15 g 0    citalopram (CELEXA) 10 mg tablet Take 1 tablet by mouth daily. 30 tablet 5    clonazePAM (KLONOPIN) 0.5 mg tablet Take 1 tablet by mouth two (2) times daily as needed. 60 tablet 1    aspirin 81 mg tablet Take 81 mg by mouth. Significant Psychosocial History   Has a doctor every diagnosed with Binge Eating Disorder, Bulemia or Anorexia? : no     Compliance  Upcoming Travel? no    Social History  Social History   Substance Use Topics    Smoking status: Former Smoker    Smokeless tobacco: Never Used    Alcohol use Yes      Comment: rare       Exercise  I personally reviewed the Medical Screening Hui Stapleton completed by the patient and scanned into media section of chart.       Review of Systems  See HPI        Objective  Visit Vitals    /68    Pulse 68    Temp 97.9 °F (36.6 °C) (Oral)    Resp 18    Ht 5' 8\" (1.727 m)    Wt 258 lb (117 kg)    SpO2 97%    BMI 39.23 kg/m2         Weight Metrics 5/22/2017 5/22/2017 2/15/2017 2/15/2017 2/7/2017 2/2/2017 4/25/2016   Weight - 258 lb - 301 lb 3.2 oz 306 lb 305 lb 298 lb 12.8 oz   Neck Circ (inches) 18 - 21.5 - - - -   Waist Measure Inches 48 - 54.5 - - - -   Exercise Mins/week 210 - 0 - - - -   Body Fat % - - 37.2 - - - -   BMI - 39.23 kg/m2 - 45.8 kg/m2 46.53 kg/m2 46.38 kg/m2 45.44 kg/m2       Labs: See HDL labs scanned into Media section       Physical Exam    Vital Signs Reviewed  Weight Management Metrics Reviewed    Appearance: well   HEENT:  Scleral icterus?  no  Neck:  Thyromegaly or nodules? no  Mouth: Large tongue no  Heart:  RRR  Lungs:  clear  Abdomen:     Hepatomegaly? no   Striae present? no  Skin:    Acne?  no   Hirsutism? no   Skin tags? no   Acanthosis Nigricans?  no  Ext:  Edema? no      Assessment & Plan  Encounter Diagnoses   Name Primary?  Screening, ischemic heart disease     Controlled type 2 diabetes mellitus without complication, without long-term current use of insulin (HCC)     Obesity, morbid, BMI 40.0-49.9 (HCC)     Abnormal EKG Yes       1. HDL labs reviewed w/ patient  2. EKG reviewed w/ patient:   Personally reviewed by me, NSR, No abnormalities,    QTc = 418 sec (Upper limit is 440 for males & 460 for females)    Abnormal EKG- Q waves in lead 3 and avf. Possible inferior infarct    3. Medication changes include: no  Based on his history, labs and EKG, Monse Angel is not  a good candidate for the [facility]   Until cardiac eval  25 min of the 45 minutes face to face time with Deonte Steward consisted of counseling & coordinating and/or discussing treatment plans in reference to his obesity The primary encounter diagnosis was Abnormal EKG. Diagnoses of Screening, ischemic heart disease, Controlled type 2 diabetes mellitus without complication, without long-term current use of insulin ([facility] [address] and Obesity, morbid, BMI 40.0-49.9 ([facility] [address] were also pertinent to this visit.

## 2017-05-22 NOTE — PATIENT INSTRUCTIONS
Learning About Diabetes and Your Teeth  How does diabetes affect your teeth and gums? When you have diabetes, managing blood sugar levels and taking good care of your teeth and gums are both important. When blood sugar levels are high, there's a greater risk for:  · Gum (periodontal) disease. · Tooth decay. · Fungal infections in the mouth, like thrush. · Dry mouth, or xerostomia (say \"alvaro-yue-STO-leeann-uh\"). The mouth needs saliva to neutralize the acids in your mouth. These acids can lead to gum disease and tooth decay. Keeping your blood sugar levels in your target range can help prevent problems with the teeth and gums. If you have any problems with your teeth or gums, see your dentist.  How do you care for your teeth and gums when you have diabetes? · Brush your teeth twice a day. · Floss daily. Make sure to press the floss against your teeth and not your gums. · Check each day for areas where your gums might be red or painful. Be sure to let your dentist know of any sores in your mouth. · See your dentist regularly for professional cleaning of your teeth and to look for gum problems. Many dentists recommend getting checkups twice a year. Remind your dentist that you have diabetes before any work is done. · Don't smoke or use smokeless tobacco. Tobacco use with diabetes can lead to a greater risk of severe gum disease. If you need help quitting, talk to your doctor about stop-smoking programs and medicines. These can increase your chances of quitting for good. Follow-up care is a key part of your treatment and safety. Be sure to make and go to all appointments, and call your doctor if you are having problems. It's also a good idea to know your test results and keep a list of the medicines you take. Where can you learn more? Go to http://donald-liana.info/. Enter J747 in the search box to learn more about \"Learning About Diabetes and Your Teeth. \"  Current as of: May 23, 2016  Content Version: 11.2  © 0552-1921 [employer]. Care instructions adapted under license by "[employer] (which disclaims liability or warranty for this information). If you have questions about a medical condition or this instruction, always ask your healthcare professional. [address] any warranty or liability for your use of this information. Diabetes Foot Health: Care Instructions  Your Care Instructions    When you have diabetes, your feet need extra care and attention. Diabetes can damage the nerve endings and blood vessels in your feet, making you less likely to notice when your feet are injured. Diabetes also limits your body's ability to fight infection and get blood to areas that need it. If you get a minor foot injury, it could become an ulcer or a serious infection. With good foot care, you can prevent most of these problems. Caring for your feet can be quick and easy. Most of the care can be done when you are bathing or getting ready for bed. Follow-up care is a key part of your treatment and safety. Be sure to make and go to all appointments, and call your doctor if you are having problems. Its also a good idea to know your test results and keep a list of the medicines you take. How can you care for yourself at home? · Keep your blood sugar close to normal by watching what and how much you eat, monitoring blood sugar, taking medicines if prescribed, and getting regular exercise. · Do not smoke. Smoking affects blood flow and can make foot problems worse. If you need help quitting, talk to your doctor about stop-smoking programs and medicines. These can increase your chances of quitting for good. · Eat a diet that is low in fats. High fat intake can cause fat to build up in your blood vessels and decrease blood flow. · Inspect your feet daily for blisters, cuts, cracks, or sores.  If you cannot see well, use a mirror or have someone help you.  · Take care of your feet:  ¨ Wash your feet every day. Use warm (not hot) water. Check the water temperature with your wrists or other part of your body, not your feet. ¨ Dry your feet well. Pat them dry. Do not rub the skin on your feet too hard. Dry well between your toes. If the skin on your feet stays moist, bacteria or a fungus can grow, which can lead to infection. ¨ Keep your skin soft. Use moisturizing skin cream to keep the skin on your feet soft and prevent calluses and cracks. But do not put the cream between your toes, and stop using any cream that causes a rash. ¨ Clean underneath your toenails carefully. Do not use a sharp object to clean underneath your toenails. Use the blunt end of a nail file or other rounded tool. ¨ Trim and file your toenails straight across to prevent ingrown toenails. Use a nail clipper, not scissors. Use an emery board to smooth the edges. · Change socks daily. Socks without seams are best, because seams often rub the feet. You can find socks for people with diabetes from specialty catalogs. · Look inside your shoes every day for things like gravel or torn linings, which could cause blisters or sores. · Buy shoes that fit well:  ¨ Look for shoes that have plenty of space around the toes. This helps prevent bunions and blisters. ¨ Try on shoes while wearing the kind of socks you will usually wear with the shoes. ¨ Avoid plastic shoes. They may rub your feet and cause blisters. Good shoes should be made of materials that are flexible and breathable, such as leather or cloth. ¨ Break in new shoes slowly by wearing them for no more than an hour a day for several days. Take extra time to check your feet for red areas, blisters, or other problems after you wear new shoes. · Do not go barefoot. Do not wear sandals, and do not wear shoes with very thin soles. Thin soles are easy to puncture. They also do not protect your feet from hot pavement or cold weather.   · Have your doctor check your feet during each visit. If you have a foot problem, see your doctor. Do not try to treat an early foot problem at home. Home remedies or treatments that you can buy without a prescription (such as corn removers) can be harmful. · Always get early treatment for foot problems. A minor irritation can lead to a major problem if not properly cared for early. When should you call for help? Call your doctor now or seek immediate medical care if:  · You have a foot sore, an ulcer or break in the skin that is not healing after 4 days, bleeding corns or calluses, or an ingrown toenail. · You have blue or black areas, which can mean bruising or blood flow problems. · You have peeling skin or tiny blisters between your toes or cracking or oozing of the skin. · You have a fever for more than 24 hours and a foot sore. · You have new numbness or tingling in your feet that does not go away after you move your feet or change positions. · You have unexplained or unusual swelling of the foot or ankle. Watch closely for changes in your health, and be sure to contact your doctor if:  · You cannot do proper foot care. Where can you learn more? Go to http://donald-liana.info/. Enter A739 in the search box to learn more about \"Diabetes Foot Health: Care Instructions. \"  Current as of: May 23, 2016  Content Version: 11.2  © 7855-8901 [employer]. Care instructions adapted under license by [employer] (which disclaims liability or warranty for this information). If you have questions about a medical condition or this instruction, always ask your healthcare professional. [address] any warranty or liability for your use of this information.

## 2017-06-06 ENCOUNTER — OFFICE VISIT (OUTPATIENT)
Dept: CARDIOLOGY CLINIC | Age: 42
End: 2017-06-06

## 2017-06-06 VITALS
OXYGEN SATURATION: 96 % | RESPIRATION RATE: 18 BRPM | HEART RATE: 64 BPM | BODY MASS INDEX: 39.92 KG/M2 | WEIGHT: 263.4 LBS | HEIGHT: 68 IN | SYSTOLIC BLOOD PRESSURE: 122 MMHG | DIASTOLIC BLOOD PRESSURE: 70 MMHG

## 2017-06-06 DIAGNOSIS — R94.31 ECG ABNORMAL: Primary | ICD-10-CM

## 2017-06-06 DIAGNOSIS — E11.9 CONTROLLED TYPE 2 DIABETES MELLITUS WITHOUT COMPLICATION, WITHOUT LONG-TERM CURRENT USE OF INSULIN (HCC): ICD-10-CM

## 2017-06-06 NOTE — PROGRESS NOTES
LAST OFFICE VISIT : Visit date not found        ICD-10-CM ICD-9-CM   1. Controlled type 2 diabetes mellitus without complication, without long-term current use of insulin ([facility]) E11.9 250.00            Harper Sales is a 46 y.o. male with  referred for  Abnormal ECG and he is planning on going on a weight loss diet and needs approval.  The patient denies chest pain/ shortness of breath, orthopnea, PND, LE edema, palpitations, syncope, presyncope or fatigue. Cardiac risk factors: family history, diabetes mellitus, obesity, sedentary life style, male gender, hypertension. I have personally obtained the history from the patient. HISTORY OF PRESENTING ILLNESS      He states he has had left forearm pain but no chest pain. Arm hurts to touch. No SOB. He states he also had a stress test in the past that was normal       ACTIVE PROBLEM LIST     Patient Active Problem List    Diagnosis Date Noted    Controlled type 2 diabetes mellitus without complication, without long-term current use of insulin ([facility] 02/15/2017    Hearing loss 03/04/2013    Vertigo 03/04/2013    Pulsatile tinnitus 03/04/2013    Anxiety 12/02/2009    GERD (gastroesophageal reflux disease) 08/31/2009           PAST MEDICAL HISTORY     Past Medical History:   Diagnosis Date    Asthma     Bronchitis     Chronic pain     Depression     Gastrointestinal disorder     GERD    Otitis media     Sinus infection     Vertigo 3/4/2013           PAST SURGICAL HISTORY     History reviewed. No pertinent surgical history.        ALLERGIES     Allergies   Allergen Reactions    Ativan [Lorazepam] Palpitations    Avelox [Moxifloxacin] Palpitations          FAMILY HISTORY     Family History   Problem Relation Age of Onset    Kidney Disease Mother     Hypertension Mother     Diabetes Mother     Cancer Mother      uterine    Elevated Lipids Mother     Coronary Artery Disease Father      premature    Hypertension Father     Diabetes Father  High Cholesterol Father     Cancer Father      skin    Elevated Lipids Father     Heart Disease Father     Cancer Sister      uterine    Hypertension Brother     High Cholesterol Brother     Coronary Artery Disease Paternal Uncle     Coronary Artery Disease Paternal Grandmother     Coronary Artery Disease Paternal Grandfather     negative for cardiac disease       SOCIAL HISTORY     Social History     Social History    Marital status:      Spouse name: N/A    Number of children: N/A    Years of education: N/A     Social History Main Topics    Smoking status: Former Smoker    Smokeless tobacco: Never Used    Alcohol use Yes      Comment: rare    Drug use: No    Sexual activity: Yes     Partners: Female     Other Topics Concern    None     Social History Narrative         MEDICATIONS     Current Outpatient Prescriptions   Medication Sig    topiramate (TOPAMAX) 25 mg tablet TAKE 1 TABLET BY MOUTH EVERY DAY WITH DINNER    amLODIPine (NORVASC) 5 mg tablet TAKE 1 TABLET BY MOUTH EVERY DAY    metFORMIN (GLUCOPHAGE) 500 mg tablet Take 1 Tab by mouth two (2) times daily (with meals).  Blood-Glucose Meter monitoring kit E11.65    Lancets misc E11.65 Check blood sugar once daily    glucose blood VI test strips (BLOOD GLUCOSE TEST) strip E11.65 Check blood sugar daily    fluticasone (FLONASE) 50 mcg/actuation nasal spray 2 Sprays by Both Nostrils route daily.  ibuprofen 200 mg cap Take  by mouth.  acetaminophen (TYLENOL) 325 mg tablet Take  by mouth every four (4) hours as needed for Pain.  Neomycin-Polymyxin-Pramoxine (NEOSPORIN + PAIN RELIEF) 3.5-10,000-10 mg-unit-mg/gram topical cream Apply  to affected area two (2) times a day.  citalopram (CELEXA) 10 mg tablet Take 1 tablet by mouth daily.  clonazePAM (KLONOPIN) 0.5 mg tablet Take 1 tablet by mouth two (2) times daily as needed.  aspirin 81 mg tablet Take 81 mg by mouth.      No current facility-administered medications for this visit. I have reviewed the nurses notes, vitals, problem list, allergy list, medical history, family, social history and medications. REVIEW OF SYMPTOMS      General: Pt denies excessive weight gain or loss. Pt is able to conduct ADL's  HEENT: Denies blurred vision, headaches, hearing loss, epistaxis and difficulty swallowing. Respiratory: Denies cough, congestion, shortness of breath, KU, wheezing or stridor. Cardiovascular: Denies precordial pain, palpitations, edema or PND  Gastrointestinal: Denies poor appetite, indigestion, abdominal pain or blood in stool  Genitourinary: Denies hematuria, dysuria, increased urinary frequency  Musculoskeletal: Denies joint pain or swelling from muscles or joints; left forearm pain with palpitation  Neurologic: Denies tremor, paresthesias, headache, or sensory motor disturbance  Psychiatric: Denies confusion, insomnia, depression  Integumentray: Denies rash, itching or ulcers. Hematologic: Denies easy bruising, bleeding     PHYSICAL EXAMINATION      Vitals:    17 0955   Weight: 263 lb 6.4 oz (119.5 kg)   Height: 5' 8\" (1.727 m)     General: Well developed, in no acute distress. HEENT: No jaundice, oral mucosa moist, no oral ulcers  Neck: Supple, no stiffness, no lymphadenopathy, supple  Heart:  Normal S1/S2 negative S3 or S4. Regular, no murmur, gallop or rub, no jugular venous distention  Respiratory: Clear bilaterally x 4, no wheezing or rales  Abdomen:   Soft, non-tender, bowel sounds are active.   Extremities:  No edema, normal cap refill, no cyanosis. Musculoskeletal: No clubbing, no deformities  Neuro: A&Ox3, speech clear, gait stable, cooperative, no focal neurologic deficits  Skin: Skin color is normal. No rashes or lesions. Non diaphoretic, moist.  Vascular: 2+ pulses symmetric in all extremities        EK):   Prominent R(V1) and left axis -nonspecific  -Seen with pulmonary disease -possible anterior fascicular block.        DIAGNOSTIC DATA       1. Lipids  2/3/17- , LDL 98, HDL 34,     2. NM Stress  9/3/09- no ischemia       LABORATORY DATA            Lab Results   Component Value Date/Time    WBC 6.6 05/30/2014 12:20 PM    Hemoglobin (POC) 15.0 06/10/2011 12:40 AM    HGB 16.0 05/30/2014 12:20 PM    Hematocrit (POC) 44 06/10/2011 12:40 AM    HCT 45.6 05/30/2014 12:20 PM    PLATELET 732 56/64/7526 12:20 PM    MCV 84.6 05/30/2014 12:20 PM      Lab Results   Component Value Date/Time    Sodium 140 02/21/2017 08:37 AM    Potassium 4.0 02/21/2017 08:37 AM    Chloride 103 02/21/2017 08:37 AM    CO2 18 02/21/2017 08:37 AM    Anion gap 14 05/30/2014 12:20 PM    Glucose 96 02/21/2017 08:37 AM    BUN 22 02/21/2017 08:37 AM    Creatinine 1.12 02/21/2017 08:37 AM    BUN/Creatinine ratio 20 02/21/2017 08:37 AM    GFR est AA 95 02/21/2017 08:37 AM    GFR est non-AA 82 02/21/2017 08:37 AM    Calcium 9.0 02/21/2017 08:37 AM    Bilirubin, total 0.6 02/21/2017 08:37 AM    AST (SGOT) 38 02/21/2017 08:37 AM    Alk. phosphatase 96 02/21/2017 08:37 AM    Protein, total 7.6 02/21/2017 08:37 AM    Albumin 4.7 02/21/2017 08:37 AM    Globulin 3.8 05/30/2014 12:20 PM    A-G Ratio 1.6 02/21/2017 08:37 AM    ALT (SGPT) 71 02/21/2017 08:37 AM           ASSESSMENT/RECOMMENDATIONS:.      1. Abnormal ECG  -he does have a new RBBB but the reading on the ECG of inferior Mi is incorrect I believe. There should be Q waves in all inferior leads and there are none in lead 3  -I favor checking an echo but see no need for stress testing  -HE IS APPROVEDTO GO ON WEIGHT LOSS DIET. 2. Obesity  -is motivated and doing a great job. 3. DM  - HgbA1c should be close to 5.6 but with his weight loss he may not need     4. HTN   -on amlodipine    5. ANA on CPAP  -low presures    No orders of the defined types were placed in this encounter. Follow-up Disposition:  Return in about 6 weeks (around 7/18/2017).       I have discussed the diagnosis with Jovany Alaniz and the intended plan as seen in the above orders. Questions were answered concerning future plans. I have discussed medication side effects and warnings with the patient as well. Thank you,  Lili Abdi MD for involving me in the care of  Jovany Alaniz. Please do not hesitate to contact me for further questions/concerns. Cornell Nur MD, [address] [place] [address] Rd[address]      [place] [address], [address] [place], [place] [address]An[facility]      [phone] / [phone] Fax

## 2017-06-06 NOTE — PROGRESS NOTES
Chief Complaint   Patient presents with   [facility] Patient    Abnormal EKG    Arm Pain     C/O (L) Arm Pain beginning at the elbow radiating to finger tips and numbness and tingling in (L) Hand finger tips    Other     Discuss testing for possible blood clots, patient complaing of increase (L) Side Numbness and memory loss     Heart Murmur     per patient born with heart murmur     Visit Vitals    /70    Pulse 64    Resp 18    Ht 5' 8\" (1.727 m)    Wt 263 lb 6.4 oz (119.5 kg)    SpO2 96%    BMI 40.05 kg/m2

## 2017-06-06 NOTE — COMMUNICATION BODY
Vijay Cervantes    I had the opportunity of seeing Mr. Yumiko Trinidad in the office today. He does have a new incomplete RBBB. He has not had any cardiac sx's. I favor going forward with an echo to look for any regional wall motion abnormalities. He is approved to begin his diet for weight loss. He is very motivated and an inspiration.      All the best,      Jackelyn Tabares

## 2017-06-06 NOTE — MR AVS SNAPSHOT
Visit Information Date & Time Provider Department Dept. Phone Encounter #  
 6/6/2017 10:00 AM Jamie Mayes MD [facility] Lilli [phone] [id number] Follow-up Instructions Return in about 6 weeks (around 7/18/2017). Upcoming Health Maintenance Date Due  
 FOOT EXAM Q1 10/15/1987 MICROALBUMIN Q1 10/15/1987 EYE EXAM RETINAL OR DILATED Q1 10/15/1987 Pneumococcal 19-64 Medium Risk (1 of 1 - PPSV23) 10/15/1996 INFLUENZA AGE 9 TO ADULT 8/1/2017 HEMOGLOBIN A1C Q6M 8/2/2017 LIPID PANEL Q1 2/2/2018 DTaP/Tdap/Td series (2 - Td) 3/26/2020 Allergies as of 6/6/2017  Review Complete On: 6/6/2017 By: Jamie Mayes MD  
  
 Severity Noted Reaction Type Reactions Ativan [Lorazepam]  03/04/2013    Palpitations Avelox [Moxifloxacin]  03/04/2013    Palpitations Current Immunizations  Reviewed on 7/24/2014 Name Date Influenza Vaccine Split 12/1/2010 TD Vaccine 3/26/1999 TDAP Vaccine 3/26/2010 Not reviewed this visit You Were Diagnosed With   
  
 Codes Comments Controlled type 2 diabetes mellitus without complication, without long-term current use of insulin ([facility].)    -  Primary ICD-10-CM: E11.9 ICD-9-CM: 250.00 Vitals BP Pulse Resp Height(growth percentile) Weight(growth percentile) SpO2  
 122/70 64 18 5' 8\" (1.727 m) 263 lb 6.4 oz (119.5 kg) 96% BMI Smoking Status 40.05 kg/m2 Former Smoker Vitals History BMI and BSA Data Body Mass Index Body Surface Area 40.05 kg/m 2 2.39 m 2 Preferred Pharmacy Pharmacy Name Phone [id number] [address] [phone] Your Updated Medication List  
  
   
This list is accurate as of: 6/6/17 10:21 AM.  Always use your most recent med list. amLODIPine 5 mg tablet Commonly known as:  Sandra Million TAKE 1 TABLET BY MOUTH EVERY DAY  
  
 aspirin 81 mg tablet Take 81 mg by mouth. Blood-Glucose Meter monitoring kit E11.65  
  
 citalopram 10 mg tablet Commonly known as:  Teddy Verma Take 1 tablet by mouth daily. clonazePAM 0.5 mg tablet Commonly known as:  Valere Idler Take 1 tablet by mouth two (2) times daily as needed. fluticasone 50 mcg/actuation nasal spray Commonly known as:  Ron Bumpers 2 Sprays by Both Nostrils route daily. glucose blood VI test strips strip Commonly known as:  blood glucose test  
E11.65 Check blood sugar daily  
  
 ibuprofen 200 mg Cap Take  by mouth. Lancets Misc E11.65 Check blood sugar once daily  
  
 metFORMIN 500 mg tablet Commonly known as:  GLUCOPHAGE Take 1 Tab by mouth two (2) times daily (with meals). Neomycin-Polymyxin-Pramoxine 3.5-10,000-10 mg-unit-mg/gram topical cream  
Commonly known as:  NEOSPORIN + PAIN RELIEF Apply  to affected area two (2) times a day. topiramate 25 mg tablet Commonly known as:  TOPAMAX TAKE 1 TABLET BY MOUTH EVERY DAY WITH DINNER  
  
 TYLENOL 325 mg tablet Generic drug:  acetaminophen Take  by mouth every four (4) hours as needed for Pain. Follow-up Instructions Return in about 6 weeks (around 7/18/2017). Introducing [facility]! Dear Chandler Reeder: 
Thank you for requesting a Solexa account. Our records indicate that you already have an active Solexa account. You can access your account anytime at [email or web address] Did you know that you can access your hospital and ER discharge instructions at any time in Solexa? You can also review all of your test results from your hospital stay or ER visit. Additional Information If you have questions, please visit the Frequently Asked Questions section of the Solexa website at [email or web address] Remember, Solexa is NOT to be used for urgent needs. For medical emergencies, dial 911. Now available from your iPhone and Android! Please provide this summary of care documentation to your next provider. Your primary care clinician is listed as Gloria Torres. If you have any questions after today's visit, please call [phone].

## 2017-06-08 ENCOUNTER — TELEPHONE (OUTPATIENT)
Dept: FAMILY MEDICINE CLINIC | Age: 42
End: 2017-06-08

## 2017-06-08 NOTE — TELEPHONE ENCOUNTER
The patient's wife called to state that the cardiologist had cleared her  to start the MSWL program; does Dr. Rita Medina need to see written notification that he has been cleared; is his next step to just come in and purchase the product and start the program; best contact number for the patient is [phone]; thank you

## 2017-06-08 NOTE — TELEPHONE ENCOUNTER
Spoke with wife and advised patient can come in for product. No further appt required. Patient verbalized understanding and had no questions at this time.

## 2017-06-13 ENCOUNTER — CLINICAL SUPPORT (OUTPATIENT)
Dept: BARIATRICS/WEIGHT MGMT | Age: 42
End: 2017-06-13

## 2017-06-13 ENCOUNTER — CLINICAL SUPPORT (OUTPATIENT)
Dept: CARDIOLOGY CLINIC | Age: 42
End: 2017-06-13

## 2017-06-13 VITALS
HEIGHT: 68 IN | HEART RATE: 83 BPM | DIASTOLIC BLOOD PRESSURE: 82 MMHG | WEIGHT: 252.2 LBS | BODY MASS INDEX: 38.22 KG/M2 | SYSTOLIC BLOOD PRESSURE: 143 MMHG

## 2017-06-13 DIAGNOSIS — R94.31 ABNORMAL EKG: Primary | ICD-10-CM

## 2017-06-13 DIAGNOSIS — E11.9 CONTROLLED TYPE 2 DIABETES MELLITUS WITHOUT COMPLICATION, WITHOUT LONG-TERM CURRENT USE OF INSULIN (HCC): Primary | ICD-10-CM

## 2017-06-13 DIAGNOSIS — K21.9 GASTROESOPHAGEAL REFLUX DISEASE WITHOUT ESOPHAGITIS: ICD-10-CM

## 2017-06-13 NOTE — PROGRESS NOTES
Progress Note: Weekly Education Class in the [facility] Weight Loss Program   Is there anything that you or the patient needs to let Dr Wanda Reynaga know about? Yes, PATIENT WOULD LIKE TO KNOW IF HE SHOULD BE REFERRED TO DERMATOLOGY FOR SKIN TAGS ON NECK. Over the past week, have you experienced any side-effects? no    Archana Naranjo is a 46 y.o. male who is enrolled in [facility] Weight Loss Program    Visit Vitals    /82    Pulse 83    Ht 5' 8\" (1.727 m)    Wt 252 lb 3.2 oz (114.4 kg)    BMI 38.35 kg/m2     Weight Metrics 6/13/2017 6/6/2017 5/22/2017 5/22/2017 2/15/2017 2/15/2017 2/7/2017   Weight 252 lb 3.2 oz 263 lb 6.4 oz - 258 lb - 301 lb 3.2 oz 306 lb   Neck Circ (inches) - - 18 - 21.5 - -   Waist Measure Inches 47.5 - 48 - 54.5 - -   Exercise Mins/week - - 210 - 0 - -   Body Fat % - - - - 37.2 - -   BMI 38.35 kg/m2 40.05 kg/m2 - 39.23 kg/m2 - 45.8 kg/m2 46.53 kg/m2         Have you received any other medical care this week? no  If yes, where and for what? Have you had any change in your medications since your last visit? no  If yes what? Did you have any problems adhering to the program last week? no  If yes, please explain:       Eating Habits Over Last Week:  Did you take in 64 oz of non-caloric fluids? yes     Did you consume your 4 meal replacements each day?  yes       Physical Activity Over the Past Week:    Aerobic exercise: 0 min  Resistance exercise: 0 workouts / week

## 2017-06-15 NOTE — PROGRESS NOTES
Nurse note from patient's weekly VLCD / LCD / Maintenance class was reviewed. Pertinent medical concerns were:   none     BP Readings from Last 3 Encounters:   06/13/17 143/82   06/06/17 122/70   05/22/17 126/68       Weight Metrics 6/13/2017 6/6/2017 5/22/2017 5/22/2017 2/15/2017 2/15/2017 2/7/2017   Weight 252 lb 3.2 oz 263 lb 6.4 oz - 258 lb - 301 lb 3.2 oz 306 lb   Neck Circ (inches) - - 18 - 21.5 - -   Waist Measure Inches 47.5 - 48 - 54.5 - -   Exercise Mins/week - - 210 - 0 - -   Body Fat % - - - - 37.2 - -   BMI 38.35 kg/m2 40.05 kg/m2 - 39.23 kg/m2 - 45.8 kg/m2 46.53 kg/m2   306 start  252 now  -54 lbs thus far    Current Outpatient Prescriptions   Medication Sig Dispense Refill    topiramate (TOPAMAX) 25 mg tablet TAKE 1 TABLET BY MOUTH EVERY DAY WITH DINNER 30 Tab 1    amLODIPine (NORVASC) 5 mg tablet TAKE 1 TABLET BY MOUTH EVERY DAY 30 Tab 1    metFORMIN (GLUCOPHAGE) 500 mg tablet Take 1 Tab by mouth two (2) times daily (with meals). 60 Tab 6    Blood-Glucose Meter monitoring kit E11.65 1 Kit 0    Lancets misc E11.65 Check blood sugar once daily 100 Each 3    glucose blood VI test strips (BLOOD GLUCOSE TEST) strip E11.65 Check blood sugar daily 100 Strip 3    fluticasone (FLONASE) 50 mcg/actuation nasal spray 2 Sprays by Both Nostrils route daily. 1 Bottle 3    ibuprofen 200 mg cap Take  by mouth.  acetaminophen (TYLENOL) 325 mg tablet Take  by mouth every four (4) hours as needed for Pain.  Neomycin-Polymyxin-Pramoxine (NEOSPORIN + PAIN RELIEF) 3.5-10,000-10 mg-unit-mg/gram topical cream Apply  to affected area two (2) times a day. 15 g 0    citalopram (CELEXA) 10 mg tablet Take 1 tablet by mouth daily. 30 tablet 5    clonazePAM (KLONOPIN) 0.5 mg tablet Take 1 tablet by mouth two (2) times daily as needed. 60 tablet 1    aspirin 81 mg tablet Take 81 mg by mouth.

## 2017-06-16 ENCOUNTER — CLINICAL SUPPORT (OUTPATIENT)
Dept: BARIATRICS/WEIGHT MGMT | Age: 42
End: 2017-06-16

## 2017-06-16 VITALS
SYSTOLIC BLOOD PRESSURE: 120 MMHG | DIASTOLIC BLOOD PRESSURE: 70 MMHG | HEART RATE: 69 BPM | BODY MASS INDEX: 38.15 KG/M2 | WEIGHT: 251.7 LBS | HEIGHT: 68 IN

## 2017-06-16 DIAGNOSIS — E11.9 CONTROLLED TYPE 2 DIABETES MELLITUS WITHOUT COMPLICATION, WITHOUT LONG-TERM CURRENT USE OF INSULIN (HCC): Primary | ICD-10-CM

## 2017-06-16 NOTE — PROGRESS NOTES
Progress Note: Weekly Education Class in the [facility] Weight Loss Program   Is there anything that you or the patient needs to let  know about? no  Over the past week, have you experienced any side-effects? no    Harper Sales is a 46 y.o. male who is enrolled in [facility] Weight Loss Program    Visit Vitals    /70    Pulse 69    Ht 5' 8\" (1.727 m)    Wt 251 lb 11.2 oz (114.2 kg)    BMI 38.27 kg/m2     Weight Metrics 6/16/2017 6/13/2017 6/6/2017 5/22/2017 5/22/2017 2/15/2017 2/15/2017   Weight 251 lb 11.2 oz 252 lb 3.2 oz 263 lb 6.4 oz - 258 lb - 301 lb 3.2 oz   Neck Circ (inches) - - - 18 - 21.5 -   Waist Measure Inches 47 47.5 - 48 - 54.5 -   Exercise Mins/week - - - 210 - 0 -   Body Fat % - - - - - 37.2 -   BMI 38.27 kg/m2 38.35 kg/m2 40.05 kg/m2 - 39.23 kg/m2 - 45.8 kg/m2         Have you received any other medical care this week? no  If yes, where and for what? Have you had any change in your medications since your last visit? no  If yes what? Did you have any problems adhering to the program last week? no  If yes, please explain:       Eating Habits Over Last Week:  Did you take in 64 oz of non-caloric fluids? yes     Did you consume your 4 meal replacements each day?  yes       Physical Activity Over the Past Week:    Aerobic exercise: 0 min  Resistance exercise: 0 workouts / week

## 2017-06-19 ENCOUNTER — DOCUMENTATION ONLY (OUTPATIENT)
Dept: CARDIOLOGY CLINIC | Age: 42
End: 2017-06-19

## 2017-06-19 NOTE — PROGRESS NOTES
Nurse note from patient's weekly VLCD / LCD / Maintenance class was reviewed. Pertinent medical concerns were:   none     BP Readings from Last 3 Encounters:   06/16/17 120/70   06/13/17 143/82   06/06/17 122/70       Weight Metrics 6/16/2017 6/13/2017 6/6/2017 5/22/2017 5/22/2017 2/15/2017 2/15/2017   Weight 251 lb 11.2 oz 252 lb 3.2 oz 263 lb 6.4 oz - 258 lb - 301 lb 3.2 oz   Neck Circ (inches) - - - 18 - 21.5 -   Waist Measure Inches 47 47.5 - 48 - 54.5 -   Exercise Mins/week - - - 210 - 0 -   Body Fat % - - - - - 37.2 -   BMI 38.27 kg/m2 38.35 kg/m2 40.05 kg/m2 - 39.23 kg/m2 - 45.8 kg/m2   303 start  251 now  -52 lbs    Current Outpatient Prescriptions   Medication Sig Dispense Refill    topiramate (TOPAMAX) 25 mg tablet TAKE 1 TABLET BY MOUTH EVERY DAY WITH DINNER 30 Tab 1    amLODIPine (NORVASC) 5 mg tablet TAKE 1 TABLET BY MOUTH EVERY DAY 30 Tab 1    metFORMIN (GLUCOPHAGE) 500 mg tablet Take 1 Tab by mouth two (2) times daily (with meals). 60 Tab 6    Blood-Glucose Meter monitoring kit E11.65 1 Kit 0    Lancets misc E11.65 Check blood sugar once daily 100 Each 3    glucose blood VI test strips (BLOOD GLUCOSE TEST) strip E11.65 Check blood sugar daily 100 Strip 3    fluticasone (FLONASE) 50 mcg/actuation nasal spray 2 Sprays by Both Nostrils route daily. 1 Bottle 3    ibuprofen 200 mg cap Take  by mouth.  acetaminophen (TYLENOL) 325 mg tablet Take  by mouth every four (4) hours as needed for Pain.  Neomycin-Polymyxin-Pramoxine (NEOSPORIN + PAIN RELIEF) 3.5-10,000-10 mg-unit-mg/gram topical cream Apply  to affected area two (2) times a day. 15 g 0    citalopram (CELEXA) 10 mg tablet Take 1 tablet by mouth daily. 30 tablet 5    clonazePAM (KLONOPIN) 0.5 mg tablet Take 1 tablet by mouth two (2) times daily as needed. 60 tablet 1    aspirin 81 mg tablet Take 81 mg by mouth.

## 2017-06-23 ENCOUNTER — CLINICAL SUPPORT (OUTPATIENT)
Dept: BARIATRICS/WEIGHT MGMT | Age: 42
End: 2017-06-23

## 2017-06-23 ENCOUNTER — OFFICE VISIT (OUTPATIENT)
Dept: FAMILY MEDICINE CLINIC | Age: 42
End: 2017-06-23

## 2017-06-23 VITALS
SYSTOLIC BLOOD PRESSURE: 126 MMHG | HEIGHT: 68 IN | TEMPERATURE: 97.7 F | OXYGEN SATURATION: 96 % | HEART RATE: 73 BPM | BODY MASS INDEX: 39.25 KG/M2 | WEIGHT: 259 LBS | DIASTOLIC BLOOD PRESSURE: 78 MMHG | RESPIRATION RATE: 19 BRPM

## 2017-06-23 VITALS
HEIGHT: 68 IN | SYSTOLIC BLOOD PRESSURE: 119 MMHG | HEART RATE: 73 BPM | WEIGHT: 252.6 LBS | BODY MASS INDEX: 38.28 KG/M2 | DIASTOLIC BLOOD PRESSURE: 72 MMHG

## 2017-06-23 DIAGNOSIS — E66.01 OBESITY, MORBID, BMI 40.0-49.9 (HCC): ICD-10-CM

## 2017-06-23 DIAGNOSIS — E11.9 CONTROLLED TYPE 2 DIABETES MELLITUS WITHOUT COMPLICATION, WITHOUT LONG-TERM CURRENT USE OF INSULIN (HCC): ICD-10-CM

## 2017-06-23 DIAGNOSIS — E11.9 CONTROLLED TYPE 2 DIABETES MELLITUS WITHOUT COMPLICATION, WITHOUT LONG-TERM CURRENT USE OF INSULIN (HCC): Primary | ICD-10-CM

## 2017-06-23 DIAGNOSIS — M77.12 LEFT TENNIS ELBOW: Primary | ICD-10-CM

## 2017-06-23 RX ORDER — DICLOFENAC SODIUM 10 MG/G
GEL TOPICAL 4 TIMES DAILY
Qty: 1 EACH | Refills: 6 | Status: SHIPPED | OUTPATIENT
Start: 2017-06-23 | End: 2017-07-14 | Stop reason: ALTCHOICE

## 2017-06-23 NOTE — MR AVS SNAPSHOT
Visit Information Date & Time Provider Department Dept. Phone Encounter #  
 6/23/2017  8:30 AM Fern Salazar MD [address] [place] [address] [id number] Follow-up Instructions Return in about 2 weeks (around 7/7/2017). Your Appointments 12/19/2017 10:00 AM  
ESTABLISHED PATIENT with Shanel Ordoñez MD  
[facility] ([facility] SCHEDULING) Appt Note: 6 mo fup  
 320 [facility] [address] [place] [address] Road  
54 [facility] [address] [address] Upcoming Health Maintenance Date Due  
 FOOT EXAM Q1 10/15/1987 MICROALBUMIN Q1 10/15/1987 EYE EXAM RETINAL OR DILATED Q1 10/15/1987 Pneumococcal 19-64 Medium Risk (1 of 1 - PPSV23) 10/15/1996 INFLUENZA AGE 9 TO ADULT 8/1/2017 HEMOGLOBIN A1C Q6M 8/2/2017 LIPID PANEL Q1 2/2/2018 DTaP/Tdap/Td series (2 - Td) 3/26/2020 Allergies as of 6/23/2017  Review Complete On: 6/23/2017 By: Fern Salazar MD  
  
 Severity Noted Reaction Type Reactions Ativan [Lorazepam]  03/04/2013    Palpitations Avelox [Moxifloxacin]  03/04/2013    Palpitations Current Immunizations  Reviewed on 7/24/2014 Name Date Influenza Vaccine Split 12/1/2010 TD Vaccine 3/26/1999 TDAP Vaccine 3/26/2010 Not reviewed this visit You Were Diagnosed With   
  
 Codes Comments Left tennis elbow    -  Primary ICD-10-CM: M77.12 
ICD-9-CM: 726.32 Controlled type 2 diabetes mellitus without complication, without long-term current use of insulin ([facility]     ICD-10-CM: E11.9 ICD-9-CM: 250.00 Vitals BP Pulse Temp Resp Height(growth percentile) Weight(growth percentile) 126/78 73 97.7 °F (36.5 °C) (Oral) 19 5' 8\" (1.727 m) 259 lb (117.5 kg) SpO2 BMI Smoking Status 96% 39.38 kg/m2 Former Smoker BMI and BSA Data  Body Mass Index Body Surface Area  
 39.38 kg/m 2 2.37 m 2  
  
  
 Preferred Pharmacy Pharmacy Name Phone [id number] Rice [address] [phone] Your Updated Medication List  
  
   
This list is accurate as of: 6/23/17  8:52 AM.  Always use your most recent med list. amLODIPine 5 mg tablet Commonly known as:  Geraldo Auguste TAKE 1 TABLET BY MOUTH EVERY DAY  
  
 aspirin 81 mg tablet Take 81 mg by mouth. Blood-Glucose Meter monitoring kit E11.65  
  
 citalopram 10 mg tablet Commonly known as:  Berneta Face Take 1 tablet by mouth daily. clonazePAM 0.5 mg tablet Commonly known as:  Minoo Adorno Take 1 tablet by mouth two (2) times daily as needed. diclofenac 1 % Gel Commonly known as:  VOLTAREN Apply  to affected area four (4) times daily. fluticasone 50 mcg/actuation nasal spray Commonly known as:  Creasie Merritt 2 Sprays by Both Nostrils route daily. glucose blood VI test strips strip Commonly known as:  blood glucose test  
E11.65 Check blood sugar daily  
  
 ibuprofen 200 mg Cap Take  by mouth. Lancets Misc E11.65 Check blood sugar once daily  
  
 metFORMIN 500 mg tablet Commonly known as:  GLUCOPHAGE Take 1 Tab by mouth two (2) times daily (with meals). Neomycin-Polymyxin-Pramoxine 3.5-10,000-10 mg-unit-mg/gram topical cream  
Commonly known as:  NEOSPORIN + PAIN RELIEF Apply  to affected area two (2) times a day. topiramate 25 mg tablet Commonly known as:  TOPAMAX TAKE 1 TABLET BY MOUTH EVERY DAY WITH DINNER  
  
 TYLENOL 325 mg tablet Generic drug:  acetaminophen Take  by mouth every four (4) hours as needed for Pain. Prescriptions Sent to Pharmacy Refills  
 diclofenac (VOLTAREN) 1 % gel 6 Sig: Apply  to affected area four (4) times daily. Class: Normal  
 Pharmacy: [facility]/pharm[facility] #[id number] - [place], [address] Ph #: [phone] Route: Topical  
  
Follow-up Instructions Return in about 2 weeks (around 7/7/2017). Patient Instructions Tennis Elbow: Exercises Your Care Instructions Here are some examples of typical rehabilitation exercises for your condition. Start each exercise slowly. Ease off the exercise if you start to have pain. Your doctor or physical therapist will tell you when you can start these exercises and which ones will work best for you. How to do the exercises Wrist flexor stretch 1. Extend your arm in front of you with your palm up. 2. Bend your wrist, pointing your hand toward the floor. 3. With your other hand, gently bend your wrist farther until you feel a mild to moderate stretch in your forearm. 4. Hold for at least 15 to 30 seconds. Repeat 2 to 4 times. Wrist extensor stretch Repeat steps 1 to 4 of the stretch above but begin with your extended hand palm down. Ball or sock squeeze 1. Hold a tennis ball (or a rolled-up sock) in your hand. 2. Make a fist around the ball (or sock) and squeeze. 3. Hold for about 6 seconds, and then relax for up to 10 seconds. 4. Repeat 8 to 12 times. 5. Switch the ball (or sock) to your other hand and do 8 to 12 times. Wrist deviation 1. Sit so that your arm is supported but your hand hangs off the edge of a flat surface, such as a table. 2. Hold your hand out like you are shaking hands with someone. 3. Move your hand up and down. 4. Repeat this motion 8 to 12 times. 5. Switch arms. 6. Try to do this exercise twice with each hand. Wrist curls 1. Place your forearm on a table with your hand hanging over the edge of the table, palm up. 2. Place a 1- to 2-pound weight in your hand. This may be a dumbbell, a can of food, or a filled water bottle. 3. Slowly raise and lower the weight while keeping your forearm on the table and your palm facing up. 4. Repeat this motion 8 to 12 times. 5. Switch arms, and do steps 1 through 4. 6. Repeat with your hand facing down toward the floor. Switch arms. Biceps curls 1. Sit leaning forward with your legs slightly spread and your left hand on your left thigh. 2. Place your right elbow on your right thigh, and hold the weight with your forearm horizontal. 
3. Slowly curl the weight up and toward your chest. 
4. Repeat this motion 8 to 12 times. 5. Switch arms, and do steps 1 through 4. Follow-up care is a key part of your treatment and safety. Be sure to make and go to all appointments, and call your doctor if you are having problems. It's also a good idea to know your test results and keep a list of the medicines you take. Where can you learn more? Go to http://donald-liana.info/. Enter H294 in the search box to learn more about \"Tennis Elbow: Exercises. \" Current as of: March 21, 2017 Content Version: 11.3 © 9649-4392 [employer]. Care instructions adapted under license by [employer] (which disclaims liability or warranty for this information). If you have questions about a medical condition or this instruction, always ask your healthcare professional. [place] [address] any warranty or liability for your use of this information. Tennis Elbow: Care Instructions Your Care Instructions Tennis elbow is soreness or pain on the outer part of the elbow. The pain occurs when the tendon is stretched and becomes irritated by repeated twisting of the hand, wrist, and forearm. A tendon is a tough tissue that connects muscle to bone. This injury is common in tennis players. But you also can get it from many activities that work the same muscles. Examples include gardening, painting, and using tools. Tennis elbow usually heals with rest and treatment at home. Follow-up care is a key part of your treatment and safety.  Be sure to make and go to all appointments, and call your doctor if you are having problems. It's also a good idea to know your test results and keep a list of the medicines you take. How can you care for yourself at home? · Rest your fingers, wrist, and forearm. Try to stop or reduce any activity that causes elbow pain. You may have to rest your arm for weeks to months. Follow your doctor's directions for how long to rest. 
· Put ice or a cold pack on your elbow for 10 to 20 minutes at a time. Try to do this every 1 to 2 hours for the next 3 days (when you are awake) or until the swelling goes down. Put a thin cloth between the ice and your skin. · If your doctor gave you a brace or splint, use it as directed. A \"counterforce\" brace is a strap around your forearm, just below your elbow. It may ease the pressure on the tendon and spread force throughout your arm. · Prop up your elbow on pillows to help reduce swelling. · Follow your doctor's or physical therapist's directions for exercise. · Return to your usual activities slowly. · Try to prevent the problem. Learn the best techniques for your sport. For example, make sure the  on your tennis racquet is not too big for your hand. Try not to hit a tennis ball late in your swing. · Think about asking your employer about new ways of doing your job if your elbow pain is caused by something you do at work. Medicines · Be safe with medicines. Read and follow all instructions on the label. ¨ If the doctor gave you a prescription medicine for pain, take it as prescribed. ¨ If you are not taking a prescription pain medicine, ask your doctor if you can take an over-the-counter medicine. When should you call for help? Call your doctor now or seek immediate medical care if: 
· Your pain is worse. · You cannot bend your elbow normally. · Your arm or hand is cool or pale or changes color. · You have tingling, weakness, or numbness in your hand and fingers. Watch closely for changes in your health, and be sure to contact your doctor if: · You have work problems caused by your elbow pain. · Your pain is not better after 2 weeks. Where can you learn more? Go to http://donald-liana.info/. Enter 0699 465 [id number] 25 in the search box to learn more about \"Tennis Elbow: Care Instructions. \" Current as of: March 21, 2017 Content Version: 11.3 © 2729-8019 [employer]. Care instructions adapted under license by [employer] (which disclaims liability or warranty for this information). If you have questions about a medical condition or this instruction, always ask your healthcare professional. [address] any warranty or liability for your use of this information. Introducing [facility] HEALTH SERVICES! Dear Etta Hurtado: 
Thank you for requesting a Sendah Direct account. Our records indicate that you already have an active Sendah Direct account. You can access your account anytime at [email or web address] Did you know that you can access your hospital and ER discharge instructions at any time in Sendah Direct? You can also review all of your test results from your hospital stay or ER visit. Additional Information If you have questions, please visit the Frequently Asked Questions section of the Sendah Direct website at [email or web address] Remember, Sendah Direct is NOT to be used for urgent needs. For medical emergencies, dial 911. Now available from your iPhone and Android! Please provide this summary of care documentation to your next provider. Your primary care clinician is listed as Carl Justice. If you have any questions after today's visit, please call [phone].

## 2017-06-23 NOTE — PROGRESS NOTES
Chief Complaint   Patient presents with    Arm Pain     left forearm     Pt c/o pain in his left forearm 10/10 at its worse Pt states that he can no longer lift things due to the pain in the arm. Pt states that there has been no injury to the area. Pt has tried OTC medications states the meds do help ease the pain lightly but does not resolve it.

## 2017-06-23 NOTE — PATIENT INSTRUCTIONS
Tennis Elbow: Exercises  Your Care Instructions  Here are some examples of typical rehabilitation exercises for your condition. Start each exercise slowly. Ease off the exercise if you start to have pain. Your doctor or physical therapist will tell you when you can start these exercises and which ones will work best for you. How to do the exercises  Wrist flexor stretch    1. Extend your arm in front of you with your palm up. 2. Bend your wrist, pointing your hand toward the floor. 3. With your other hand, gently bend your wrist farther until you feel a mild to moderate stretch in your forearm. 4. Hold for at least 15 to 30 seconds. Repeat 2 to 4 times. Wrist extensor stretch    Repeat steps 1 to 4 of the stretch above but begin with your extended hand palm down. Ball or sock squeeze    1. Hold a tennis ball (or a rolled-up sock) in your hand. 2. Make a fist around the ball (or sock) and squeeze. 3. Hold for about 6 seconds, and then relax for up to 10 seconds. 4. Repeat 8 to 12 times. 5. Switch the ball (or sock) to your other hand and do 8 to 12 times. Wrist deviation    1. Sit so that your arm is supported but your hand hangs off the edge of a flat surface, such as a table. 2. Hold your hand out like you are shaking hands with someone. 3. Move your hand up and down. 4. Repeat this motion 8 to 12 times. 5. Switch arms. 6. Try to do this exercise twice with each hand. Wrist curls    1. Place your forearm on a table with your hand hanging over the edge of the table, palm up. 2. Place a 1- to 2-pound weight in your hand. This may be a dumbbell, a can of food, or a filled water bottle. 3. Slowly raise and lower the weight while keeping your forearm on the table and your palm facing up. 4. Repeat this motion 8 to 12 times. 5. Switch arms, and do steps 1 through 4.  6. Repeat with your hand facing down toward the floor. Switch arms. Biceps curls    1.  Sit leaning forward with your legs slightly spread and your left hand on your left thigh. 2. Place your right elbow on your right thigh, and hold the weight with your forearm horizontal.  3. Slowly curl the weight up and toward your chest.  4. Repeat this motion 8 to 12 times. 5. Switch arms, and do steps 1 through 4. Follow-up care is a key part of your treatment and safety. Be sure to make and go to all appointments, and call your doctor if you are having problems. It's also a good idea to know your test results and keep a list of the medicines you take. Where can you learn more? Go to http://donald-liana.info/. Enter Z752 in the search box to learn more about \"Tennis Elbow: Exercises. \"  Current as of: March 21, 2017  Content Version: 11.3  © 3873-5055 [employer]. Care instructions adapted under license by [employer] (which disclaims liability or warranty for this information). If you have questions about a medical condition or this instruction, always ask your healthcare professional. [place] [address] any warranty or liability for your use of this information. Tennis Elbow: Care Instructions  Your Care Instructions  Tennis elbow is soreness or pain on the outer part of the elbow. The pain occurs when the tendon is stretched and becomes irritated by repeated twisting of the hand, wrist, and forearm. A tendon is a tough tissue that connects muscle to bone. This injury is common in tennis players. But you also can get it from many activities that work the same muscles. Examples include gardening, painting, and using tools. Tennis elbow usually heals with rest and treatment at home. Follow-up care is a key part of your treatment and safety. Be sure to make and go to all appointments, and call your doctor if you are having problems. It's also a good idea to know your test results and keep a list of the medicines you take. How can you care for yourself at home?   · Rest your fingers, wrist, and forearm. Try to stop or reduce any activity that causes elbow pain. You may have to rest your arm for weeks to months. Follow your doctor's directions for how long to rest.  · Put ice or a cold pack on your elbow for 10 to 20 minutes at a time. Try to do this every 1 to 2 hours for the next 3 days (when you are awake) or until the swelling goes down. Put a thin cloth between the ice and your skin. · If your doctor gave you a brace or splint, use it as directed. A \"counterforce\" brace is a strap around your forearm, just below your elbow. It may ease the pressure on the tendon and spread force throughout your arm. · Prop up your elbow on pillows to help reduce swelling. · Follow your doctor's or physical therapist's directions for exercise. · Return to your usual activities slowly. · Try to prevent the problem. Learn the best techniques for your sport. For example, make sure the  on your tennis racquet is not too big for your hand. Try not to hit a tennis ball late in your swing. · Think about asking your employer about new ways of doing your job if your elbow pain is caused by something you do at work. Medicines  · Be safe with medicines. Read and follow all instructions on the label. ¨ If the doctor gave you a prescription medicine for pain, take it as prescribed. ¨ If you are not taking a prescription pain medicine, ask your doctor if you can take an over-the-counter medicine. When should you call for help? Call your doctor now or seek immediate medical care if:  · Your pain is worse. · You cannot bend your elbow normally. · Your arm or hand is cool or pale or changes color. · You have tingling, weakness, or numbness in your hand and fingers. Watch closely for changes in your health, and be sure to contact your doctor if:  · You have work problems caused by your elbow pain. · Your pain is not better after 2 weeks. Where can you learn more?   Go to http://ana maria.info/. Enter 0699 [id number] in the search box to learn more about \"Tennis Elbow: Care Instructions. \"  Current as of: March 21, 2017  Content Version: 11.3  © 4471-1541 [employer]. Care instructions adapted under license by [employer] (which disclaims liability or warranty for this information). If you have questions about a medical condition or this instruction, always ask your healthcare professional. [address] any warranty or liability for your use of this information.

## 2017-06-23 NOTE — PROGRESS NOTES
Progress Note: Weekly Education Class in the [facility] Weight Loss Program   Is there anything that you or the patient needs to let Dr Kemar Rodriguez know about? no  Over the past week, have you experienced any side-effects? no    Gus Farooq is a 46 y.o. male who is enrolled in [facility]    Visit Vitals    /72    Pulse 73    Ht 5' 8\" (1.727 m)    Wt 252 lb 9.6 oz (114.6 kg)    BMI 38.41 kg/m2     Weight Metrics 6/23/2017 6/23/2017 6/16/2017 6/13/2017 6/6/2017 5/22/2017 5/22/2017   Weight 252 lb 9.6 oz 259 lb 251 lb 11.2 oz 252 lb 3.2 oz 263 lb 6.4 oz - 258 lb   Neck Circ (inches) - - - - - 18 -   Waist Measure Inches 47 - 47 47.5 - 48 -   Exercise Mins/week - - - - - 210 -   Body Fat % - - - - - - -   BMI 38.41 kg/m2 39.38 kg/m2 38.27 kg/m2 38.35 kg/m2 40.05 kg/m2 - 39.23 kg/m2         Have you received any other medical care this week? no  If yes, where and for what? Have you had any change in your medications since your last visit? no  If yes what? Did you have any problems adhering to the program last week? no  If yes, please explain:       Eating Habits Over Last Week:  Did you take in 64 oz of non-caloric fluids? yes     Did you consume your 4 meal replacements each day?  yes       Physical Activity Over the Past Week:    Aerobic exercise: 0 min  Resistance exercise: 0 workouts / week

## 2017-06-23 NOTE — PROGRESS NOTES
Chief Complaint   Patient presents with    Arm Pain     left forearm     he is a 46y.o. year old male who presents for evalution. Left elbow pain for the last 2 weeks. Getting worse and worse. He has trouble gripping things off and on. It has wakened him from sleep. Right now is 6-7/10. When he lifts something heavy it is 10+/10  He recalls an incident when he was climbing ladders up and down the muscle in that forearm developed a knot in it and he massaged it out and kept on working        Reviewed PmHx, RxHx, FmHx, SocHx, AllgHx and updated and dated in the chart. Patient Active Problem List    Diagnosis    Controlled type 2 diabetes mellitus without complication, without long-term current use of insulin ([facility]    Hearing loss    Vertigo    Pulsatile tinnitus    Anxiety    GERD (gastroesophageal reflux disease)       Nurse notes were reviewed and copied and are correct  Review of Systems - negative except as listed above in the HPI    Objective:     Vitals:    06/23/17 0823   BP: 126/78   Pulse: 73   Resp: 19   Temp: 97.7 °F (36.5 °C)   TempSrc: Oral   SpO2: 96%   Weight: 259 lb (117.5 kg)   Height: 5' 8\" (1.727 m)     Physical Examination: General appearance - alert, well appearing, and in no distress  Mental status - alert, oriented to person, place, and time  Musculoskeletal - tender on the let ebow from the joint down onto the dorsum about 4 inches, no redness or swelling         Assessment/ Plan:   Deonte Steward was seen today for arm pain. Diagnoses and all orders for this visit:    Left tennis elbow  -     diclofenac (VOLTAREN) 1 % gel; Apply  to affected area four (4) times daily. Controlled type 2 diabetes mellitus without complication, without long-term current use of insulin ([facility])     blood sugars have been in upper 90s and low 100s  Follow-up Disposition:  Return in about 2 weeks (around 7/7/2017). ICD-10-CM ICD-9-CM    1.  Left tennis elbow M77.12 726.32 diclofenac (VOLTAREN) 1 % gel 2. Controlled type 2 diabetes mellitus without complication, without long-term current use of insulin ([facility]) E11.9 250.00        I have discussed the diagnosis with the patient and the intended plan as seen in the above orders. The patient has received an after-visit summary and questions were answered concerning future plans. Medication Side Effects and Warnings were discussed with patient: yes  Patient Labs were reviewed and or requested: yes  Patient Past Records were reviewed and or requested: yes        Patient Instructions        Tennis Elbow: Exercises  Your Care Instructions  Here are some examples of typical rehabilitation exercises for your condition. Start each exercise slowly. Ease off the exercise if you start to have pain. Your doctor or physical therapist will tell you when you can start these exercises and which ones will work best for you. How to do the exercises  Wrist flexor stretch    1. Extend your arm in front of you with your palm up. 2. Bend your wrist, pointing your hand toward the floor. 3. With your other hand, gently bend your wrist farther until you feel a mild to moderate stretch in your forearm. 4. Hold for at least 15 to 30 seconds. Repeat 2 to 4 times. Wrist extensor stretch    Repeat steps 1 to 4 of the stretch above but begin with your extended hand palm down. Ball or sock squeeze    1. Hold a tennis ball (or a rolled-up sock) in your hand. 2. Make a fist around the ball (or sock) and squeeze. 3. Hold for about 6 seconds, and then relax for up to 10 seconds. 4. Repeat 8 to 12 times. 5. Switch the ball (or sock) to your other hand and do 8 to 12 times. Wrist deviation    1. Sit so that your arm is supported but your hand hangs off the edge of a flat surface, such as a table. 2. Hold your hand out like you are shaking hands with someone. 3. Move your hand up and down. 4. Repeat this motion 8 to 12 times. 5. Switch arms.   6. Try to do this exercise twice with each hand. Wrist curls    1. Place your forearm on a table with your hand hanging over the edge of the table, palm up. 2. Place a 1- to 2-pound weight in your hand. This may be a dumbbell, a can of food, or a filled water bottle. 3. Slowly raise and lower the weight while keeping your forearm on the table and your palm facing up. 4. Repeat this motion 8 to 12 times. 5. Switch arms, and do steps 1 through 4.  6. Repeat with your hand facing down toward the floor. Switch arms. Biceps curls    1. Sit leaning forward with your legs slightly spread and your left hand on your left thigh. 2. Place your right elbow on your right thigh, and hold the weight with your forearm horizontal.  3. Slowly curl the weight up and toward your chest.  4. Repeat this motion 8 to 12 times. 5. Switch arms, and do steps 1 through 4. Follow-up care is a key part of your treatment and safety. Be sure to make and go to all appointments, and call your doctor if you are having problems. It's also a good idea to know your test results and keep a list of the medicines you take. Where can you learn more? Go to http://donald-liana.info/. Enter N563 in the search box to learn more about \"Tennis Elbow: Exercises. \"  Current as of: March 21, 2017  Content Version: 11.3  © 3965-9427 [employer]. Care instructions adapted under license by [employer] (which disclaims liability or warranty for this information). If you have questions about a medical condition or this instruction, always ask your healthcare professional. [place] [address] any warranty or liability for your use of this information. Tennis Elbow: Care Instructions  Your Care Instructions  Tennis elbow is soreness or pain on the outer part of the elbow. The pain occurs when the tendon is stretched and becomes irritated by repeated twisting of the hand, wrist, and forearm.  A tendon is a tough tissue that connects muscle to bone. This injury is common in tennis players. But you also can get it from many activities that work the same muscles. Examples include gardening, painting, and using tools. Tennis elbow usually heals with rest and treatment at home. Follow-up care is a key part of your treatment and safety. Be sure to make and go to all appointments, and call your doctor if you are having problems. It's also a good idea to know your test results and keep a list of the medicines you take. How can you care for yourself at home? · Rest your fingers, wrist, and forearm. Try to stop or reduce any activity that causes elbow pain. You may have to rest your arm for weeks to months. Follow your doctor's directions for how long to rest.  · Put ice or a cold pack on your elbow for 10 to 20 minutes at a time. Try to do this every 1 to 2 hours for the next 3 days (when you are awake) or until the swelling goes down. Put a thin cloth between the ice and your skin. · If your doctor gave you a brace or splint, use it as directed. A \"counterforce\" brace is a strap around your forearm, just below your elbow. It may ease the pressure on the tendon and spread force throughout your arm. · Prop up your elbow on pillows to help reduce swelling. · Follow your doctor's or physical therapist's directions for exercise. · Return to your usual activities slowly. · Try to prevent the problem. Learn the best techniques for your sport. For example, make sure the  on your tennis racquet is not too big for your hand. Try not to hit a tennis ball late in your swing. · Think about asking your employer about new ways of doing your job if your elbow pain is caused by something you do at work. Medicines  · Be safe with medicines. Read and follow all instructions on the label. ¨ If the doctor gave you a prescription medicine for pain, take it as prescribed.   ¨ If you are not taking a prescription pain medicine, ask your doctor if you can take an over-the-counter medicine. When should you call for help? Call your doctor now or seek immediate medical care if:  · Your pain is worse. · You cannot bend your elbow normally. · Your arm or hand is cool or pale or changes color. · You have tingling, weakness, or numbness in your hand and fingers. Watch closely for changes in your health, and be sure to contact your doctor if:  · You have work problems caused by your elbow pain. · Your pain is not better after 2 weeks. Where can you learn more? Go to http://donald-liana.info/. Enter 0699 [id number] [phone] in the search box to learn more about \"Tennis Elbow: Care Instructions. \"  Current as of: March 21, 2017  Content Version: 11.3  © 6887-0956 [employer]. Care instructions adapted under license by [employer] (which disclaims liability or warranty for this information). If you have questions about a medical condition or this instruction, always ask your healthcare professional. [place] [address] any warranty or liability for your use of this information.         The patient verbalizes understanding and agrees with the plan of care        Patient has the advanced directives booklet to review

## 2017-06-24 NOTE — PROGRESS NOTES
Nurse note from patient's weekly VLCD / LCD / Maintenance class was reviewed. Pertinent medical concerns were:   none     BP Readings from Last 3 Encounters:   06/23/17 119/72   06/23/17 126/78   06/16/17 120/70       Weight Metrics 6/23/2017 6/23/2017 6/16/2017 6/13/2017 6/6/2017 5/22/2017 5/22/2017   Weight 252 lb 9.6 oz 259 lb 251 lb 11.2 oz 252 lb 3.2 oz 263 lb 6.4 oz - 258 lb   Neck Circ (inches) - - - - - 18 -   Waist Measure Inches 47 - 47 47.5 - 48 -   Exercise Mins/week - - - - - 210 -   Body Fat % - - - - - - -   BMI 38.41 kg/m2 39.38 kg/m2 38.27 kg/m2 38.35 kg/m2 40.05 kg/m2 - 39.23 kg/m2   306  252  -54 lbs  Current Outpatient Prescriptions   Medication Sig Dispense Refill    diclofenac (VOLTAREN) 1 % gel Apply  to affected area four (4) times daily. 1 Each 6    topiramate (TOPAMAX) 25 mg tablet TAKE 1 TABLET BY MOUTH EVERY DAY WITH DINNER 30 Tab 1    amLODIPine (NORVASC) 5 mg tablet TAKE 1 TABLET BY MOUTH EVERY DAY 30 Tab 1    metFORMIN (GLUCOPHAGE) 500 mg tablet Take 1 Tab by mouth two (2) times daily (with meals). 60 Tab 6    Blood-Glucose Meter monitoring kit E11.65 1 Kit 0    Lancets misc E11.65 Check blood sugar once daily 100 Each 3    glucose blood VI test strips (BLOOD GLUCOSE TEST) strip E11.65 Check blood sugar daily 100 Strip 3    fluticasone (FLONASE) 50 mcg/actuation nasal spray 2 Sprays by Both Nostrils route daily. 1 Bottle 3    ibuprofen 200 mg cap Take  by mouth.  acetaminophen (TYLENOL) 325 mg tablet Take  by mouth every four (4) hours as needed for Pain.  Neomycin-Polymyxin-Pramoxine (NEOSPORIN + PAIN RELIEF) 3.5-10,000-10 mg-unit-mg/gram topical cream Apply  to affected area two (2) times a day. 15 g 0    citalopram (CELEXA) 10 mg tablet Take 1 tablet by mouth daily. 30 tablet 5    clonazePAM (KLONOPIN) 0.5 mg tablet Take 1 tablet by mouth two (2) times daily as needed. 60 tablet 1    aspirin 81 mg tablet Take 81 mg by mouth.

## 2017-07-14 ENCOUNTER — OFFICE VISIT (OUTPATIENT)
Dept: FAMILY MEDICINE CLINIC | Age: 42
End: 2017-07-14

## 2017-07-14 VITALS
WEIGHT: 256.8 LBS | OXYGEN SATURATION: 96 % | DIASTOLIC BLOOD PRESSURE: 84 MMHG | RESPIRATION RATE: 17 BRPM | SYSTOLIC BLOOD PRESSURE: 131 MMHG | HEIGHT: 68 IN | BODY MASS INDEX: 38.92 KG/M2 | HEART RATE: 81 BPM | TEMPERATURE: 98.2 F

## 2017-07-14 DIAGNOSIS — E11.9 CONTROLLED TYPE 2 DIABETES MELLITUS WITHOUT COMPLICATION, WITHOUT LONG-TERM CURRENT USE OF INSULIN (HCC): Primary | ICD-10-CM

## 2017-07-14 DIAGNOSIS — M25.522 LEFT ELBOW PAIN: ICD-10-CM

## 2017-07-14 RX ORDER — TOLMETIN SODIUM 400 MG
400 CAPSULE ORAL 3 TIMES DAILY
Qty: 90 CAP | Refills: 2 | Status: SHIPPED | OUTPATIENT
Start: 2017-07-14 | End: 2017-10-12

## 2017-07-14 NOTE — PROGRESS NOTES
1. Have you been to the ER, urgent care clinic since your last visit? Hospitalized since your last visit? No    2. Have you seen or consulted any other health care providers outside of the [address] [place] [address] since your last visit? Include any pap smears or colon screening.  No     Chief Complaint   Patient presents with    Arm Pain     L. arm       Body Weight: 256.8  Body Fat%: 33.2  Muscle Mass Weight: 39.0  Body Water Weight: 07.8  Basal Metabolic Rate: 8219  BMI: 39.05

## 2017-07-14 NOTE — PROGRESS NOTES
Chief Complaint   Patient presents with    Arm Pain     L. arm     he is a 46y.o. year old male who presents for evalution. C/o more pain in tthe left arm , numbness in 2-5th fingers and stiffness in the elbow. Worse when wakes up in the am. He also noted a lump in the left wrist medially that is growing bigger since he first noted it    He is not on the diet plan now. He has gained 4 lbs back. He went to [facility] recently but denies eating sweets. He has been doing intentional 2 times a week about 30 min each time    DM  Blood sugars not higher than 105      Reviewed PmHx, RxHx, FmHx, SocHx, AllgHx and updated and dated in the chart. Patient Active Problem List    Diagnosis    Controlled type 2 diabetes mellitus without complication, without long-term current use of insulin (HCC)    Hearing loss    Vertigo    Pulsatile tinnitus    Anxiety    GERD (gastroesophageal reflux disease)       Nurse notes were reviewed and copied and are correct  Review of Systems - negative except as listed above in the HPI    Objective:     Vitals:    07/14/17 0819   BP: 131/84   Pulse: 81   Resp: 17   Temp: 98.2 °F (36.8 °C)   TempSrc: Oral   SpO2: 96%   Weight: 256 lb 12.8 oz (116.5 kg)   Height: 5' 8\" (1.727 m)     Physical Examination: General appearance - alert, well appearing, and in no distress and oriented to person, place, and time  Mental status - alert, oriented to person, place, and time  Neck: no JVD  Heart - normal rate, regular rhythm. No murmur  Lungs - CTAB no wheeze  Musculoskeletal- left epicondyle with no erythema or effusion. Tenderness to palpation. Neuro: tingling in hand when I touch the ulnar nerve in the olecranon fossa   Extremities - peripheral pulses normal, no pedal edema, no clubbing or cyanosis,    Skin: no rashes or bruising        Assessment/ Plan:   Rufina Enamorado was seen today for arm pain.     Diagnoses and all orders for this visit:    Controlled type 2 diabetes mellitus without complication, without long-term current use of insulin ([facility])  Great control. Continue current plan  Left elbow pain  -     tolmetin (TOLECTIN) 400 mg capsule; Take 1 Cap by mouth three (3) times daily for 90 days.  -     REFERRAL TO ORTHOPEDICS       Follow-up Disposition: Not on File    ICD-10-CM ICD-9-CM    1. Controlled type 2 diabetes mellitus without complication, without long-term current use of insulin ([facility]) E11.9 250.00    2. Left elbow pain M25.522 719.42 tolmetin (TOLECTIN) 400 mg capsule      REFERRAL TO ORTHOPEDICS       I have discussed the diagnosis with the patient and the intended plan as seen in the above orders. The patient has received an after-visit summary and questions were answered concerning future plans. Medication Side Effects and Warnings were discussed with patient: yes  Patient Labs were reviewed and or requested: yes  Patient Past Records were reviewed and or requested: yes        There are no Patient Instructions on file for this visit.     The patient verbalizes understanding and agrees with the plan of care        Patient has the advanced directives booklet to review

## 2017-07-14 NOTE — PATIENT INSTRUCTIONS

## 2017-07-14 NOTE — MR AVS SNAPSHOT
Visit Information Date & Time Provider Department Dept. Phone Encounter #  
 7/14/2017  8:15 AM Billy Murphy MD [address] [place] [address] [id number] Your Appointments 12/19/2017 10:00 AM  
ESTABLISHED PATIENT with Cris Champagne MD  
[facility] ([facility] SCHEDULING) Appt Note: 6 mo fup  
 [address] [facility] [place] [address] 1007 [facility]  
54 [facility] [address] [address] Upcoming Health Maintenance Date Due  
 FOOT EXAM Q1 10/15/1987 MICROALBUMIN Q1 10/15/1987 EYE EXAM RETINAL OR DILATED Q1 10/15/1987 Pneumococcal 19-64 Medium Risk (1 of 1 - PPSV23) 10/15/1996 HEMOGLOBIN A1C Q6M 8/2/2017 INFLUENZA AGE 9 TO ADULT 8/1/2017 LIPID PANEL Q1 2/2/2018 DTaP/Tdap/Td series (2 - Td) 3/26/2020 Allergies as of 7/14/2017  Review Complete On: 7/14/2017 By: Billy Murphy MD  
  
 Severity Noted Reaction Type Reactions Ativan [Lorazepam]  03/04/2013    Palpitations Avelox [Moxifloxacin]  03/04/2013    Palpitations Current Immunizations  Reviewed on 7/24/2014 Name Date Influenza Vaccine Split 12/1/2010 TD Vaccine 3/26/1999 TDAP Vaccine 3/26/2010 Not reviewed this visit You Were Diagnosed With   
  
 Codes Comments Controlled type 2 diabetes mellitus without complication, without long-term current use of insulin ([facility]    -  Primary ICD-10-CM: E11.9 ICD-9-CM: 250.00 Left elbow pain     ICD-10-CM: K66.758 ICD-9-CM: 719.42 Vitals BP Pulse Temp Resp Height(growth percentile) Weight(growth percentile) 131/84 81 98.2 °F (36.8 °C) (Oral) 17 5' 8\" (1.727 m) 256 lb 12.8 oz (116.5 kg) SpO2 BMI Smoking Status 96% 39.05 kg/m2 Former Smoker Vitals History BMI and BSA Data Body Mass Index Body Surface Area 39.05 kg/m 2 2.36 m 2 Preferred Pharmacy Pharmacy Name Phone [facility]/PHARMACY #[id number] Yumiko, [address] [phone] Your Updated Medication List  
  
   
This list is accurate as of: 7/14/17  9:01 AM.  Always use your most recent med list. amLODIPine 5 mg tablet Commonly known as:  Rena Oates TAKE 1 TABLET BY MOUTH EVERY DAY  
  
 aspirin 81 mg tablet Take 81 mg by mouth. Blood-Glucose Meter monitoring kit E11.65  
  
 citalopram 10 mg tablet Commonly known as:  Konstantin Kennedy Take 1 tablet by mouth daily. clonazePAM 0.5 mg tablet Commonly known as:  Socorro Panda Take 1 tablet by mouth two (2) times daily as needed. fluticasone 50 mcg/actuation nasal spray Commonly known as:  Francella Lacks 2 Sprays by Both Nostrils route daily. glucose blood VI test strips strip Commonly known as:  blood glucose test  
E11.65 Check blood sugar daily Lancets Misc E11.65 Check blood sugar once daily  
  
 metFORMIN 500 mg tablet Commonly known as:  GLUCOPHAGE Take 1 Tab by mouth two (2) times daily (with meals). Neomycin-Polymyxin-Pramoxine 3.5-10,000-10 mg-unit-mg/gram topical cream  
Commonly known as:  NEOSPORIN + PAIN RELIEF Apply  to affected area two (2) times a day. tolmetin 400 mg capsule Commonly known as:  TOLECTIN Take 1 Cap by mouth three (3) times daily for 90 days. topiramate 25 mg tablet Commonly known as:  TOPAMAX TAKE 1 TABLET BY MOUTH EVERY DAY WITH DINNER  
  
 TYLENOL 325 mg tablet Generic drug:  acetaminophen Take  by mouth every four (4) hours as needed for Pain. Prescriptions Sent to Pharmacy Refills  
 tolmetin (TOLECTIN) 400 mg capsule 2 Sig: Take 1 Cap by mouth three (3) times daily for 90 days. Class: Normal  
 Pharmacy: [facility]/pharmacy #[id number] - [place], [facility] Ph #: [phone] Route: Oral  
  
We Performed the Following REFERRAL TO ORTHOPEDICS [FYR409 Custom] Comments: Left elbow pain and numbeness in the 2-5th fingers. eval and treat Referral Information Referral ID Referred By Referred To  
  
 [id number] CHARLIE, Suni Sister Debra Reynolds, [address] [facility]. k 125 [address] [place] Phone: [phone] Fax: [phone] Visits Status Start Date End Date 1 New Request 7/14/17 7/14/18 If your referral has a status of pending review or denied, additional information will be sent to support the outcome of this decision. Patient Instructions Eating Healthy Foods: Care Instructions Your Care Instructions Eating healthy foods can help lower your risk for disease. Healthy food gives you energy and keeps your heart strong, your brain active, your muscles working, and your bones strong. A healthy diet includes a variety of foods from the basic food groups: grains, vegetables, fruits, milk and milk products, and meat and beans. Some people may eat more of their favorite foods from only one food group and, as a result, miss getting the nutrients they need. So, it is important to pay attention not only to what you eat but also to what you are missing from your diet. You can eat a healthy, balanced diet by making a few small changes. Follow-up care is a key part of your treatment and safety. Be sure to make and go to all appointments, and call your doctor if you are having problems. It's also a good idea to know your test results and keep a list of the medicines you take. How can you care for yourself at home? Look at what you eat · Keep a food diary for a week or two and record everything you eat or drink. Track the number of servings you eat from each food group. · For a balanced diet every day, eat a variety of: ¨ 6 or more ounce-equivalents of grains, such as cereals, breads, crackers, rice, or pasta, every day.  An ounce-equivalent is 1 slice of bread, 1 cup of ready-to-eat cereal, or ½ cup of cooked rice, cooked pasta, or cooked cereal. 
¨ 2½ cups of vegetables, especially: § Dark-green vegetables such as broccoli and spinach. § Orange vegetables such as carrots and sweet potatoes. § Dry beans (such as grady and kidney beans) and peas (such as lentils). ¨ 2 cups of fresh, frozen, or canned fruit. A small apple or 1 banana or orange equals 1 cup. ¨ 3 cups of nonfat or low-fat milk, yogurt, or other milk products. ¨ 5½ ounces of meat and beans, such as chicken, fish, lean meat, beans, nuts, and seeds. One egg, 1 tablespoon of peanut butter, ½ ounce nuts or seeds, or ¼ cup of cooked beans equals 1 ounce of meat. · Learn how to read food labels for serving sizes and ingredients. Fast-food and convenience-food meals often contain few or no fruits or vegetables. Make sure you eat some fruits and vegetables to make the meal more nutritious. · Look at your food diary. For each food group, add up what you have eaten and then divide the total by the number of days. This will give you an idea of how much you are eating from each food group. See if you can find some ways to change your diet to make it more healthy. Start small · Do not try to make dramatic changes to your diet all at once. You might feel that you are missing out on your favorite foods and then be more likely to fail. · Start slowly, and gradually change your habits. Try some of the following: ¨ Use whole wheat bread instead of white bread. ¨ Use nonfat or low-fat milk instead of whole milk. ¨ Eat brown rice instead of white rice, and eat whole wheat pasta instead of white-flour pasta. ¨ Try low-fat cheeses and low-fat yogurt. ¨ Add more fruits and vegetables to meals and have them for snacks. ¨ Add lettuce, tomato, cucumber, and onion to sandwiches. ¨ Add fruit to yogurt and cereal. 
Enjoy food · You can still eat your favorite foods.  You just may need to eat less of them. If your favorite foods are high in fat, salt, and sugar, limit how often you eat them, but do not cut them out entirely. · Eat a wide variety of foods. Make healthy choices when eating out · The type of restaurant you choose can help you make healthy choices. Even fast-food chains are now offering more low-fat or healthier choices on the menu. · Choose smaller portions, or take half of your meal home. · When eating out, try: ¨ A veggie pizza with a whole wheat crust or grilled chicken (instead of sausage or pepperoni). ¨ Pasta with roasted vegetables, grilled chicken, or marinara sauce instead of cream sauce. ¨ A vegetable wrap or grilled chicken wrap. ¨ Broiled or poached food instead of fried or breaded items. Make healthy choices easy · Buy packaged, prewashed, ready-to-eat fresh vegetables and fruits, such as baby carrots, salad mixes, and chopped or shredded broccoli and cauliflower. · Buy packaged, presliced fruits, such as melon or pineapple. · Choose 100% fruit or vegetable juice instead of soda. Limit juice intake to 4 to 6 oz (½ to ¾ cup) a day. · Blend low-fat yogurt, fruit juice, and canned or frozen fruit to make a smoothie for breakfast or a snack. Where can you learn more? Go to http://donald-liana.info/. Enter T756 in the search box to learn more about \"Eating Healthy Foods: Care Instructions. \" Current as of: April 3, 2017 Content Version: 11.3 © 8167-5345 Healthwise, Incorporated. Care instructions adapted under license by [employer] (which disclaims liability or warranty for this information). If you have questions about a medical condition or this instruction, always ask your healthcare professional. [address] any warranty or liability for your use of this information. Introducing [facility]! Dear Kalani Hinojosa: 
Thank you for requesting a [employer] account.   Our records indicate that you already have an active [employer] account. You can access your account anytime at [email or web address] Did you know that you can access your hospital and ER discharge instructions at any time in [employer]? You can also review all of your test results from your hospital stay or ER visit. Additional Information If you have questions, please visit the Frequently Asked Questions section of the [employer] website at [email or web address] Remember, [employer] is NOT to be used for urgent needs. For medical emergencies, dial 911. Now available from your iPhone and Android! Please provide this summary of care documentation to your next provider. Your primary care clinician is listed as Osmany Brown. If you have any questions after today's visit, please call [phone].

## 2017-08-08 DIAGNOSIS — E11.65 TYPE 2 DIABETES MELLITUS WITH HYPERGLYCEMIA, WITHOUT LONG-TERM CURRENT USE OF INSULIN (HCC): ICD-10-CM

## 2017-08-09 RX ORDER — METFORMIN HYDROCHLORIDE 500 MG/1
TABLET ORAL
Qty: 60 TAB | Refills: 2 | Status: SHIPPED | OUTPATIENT
Start: 2017-08-09 | End: 2017-11-01 | Stop reason: SDUPTHER

## 2017-08-28 DIAGNOSIS — Z13.220 SCREENING FOR HYPERLIPIDEMIA: ICD-10-CM

## 2017-08-28 DIAGNOSIS — I10 ESSENTIAL HYPERTENSION: ICD-10-CM

## 2017-08-28 DIAGNOSIS — E11.65 TYPE 2 DIABETES MELLITUS WITH HYPERGLYCEMIA, WITHOUT LONG-TERM CURRENT USE OF INSULIN (HCC): Primary | ICD-10-CM

## 2017-08-28 DIAGNOSIS — R74.8 ELEVATED LIVER ENZYMES: ICD-10-CM

## 2017-08-30 LAB
ALBUMIN SERPL-MCNC: 4.7 G/DL (ref 3.5–5.5)
ALBUMIN/GLOB SERPL: 1.7 {RATIO} (ref 1.2–2.2)
ALP SERPL-CCNC: 90 IU/L (ref 39–117)
ALT SERPL-CCNC: 21 IU/L (ref 0–44)
AST SERPL-CCNC: 19 IU/L (ref 0–40)
BILIRUB SERPL-MCNC: 0.5 MG/DL (ref 0–1.2)
BUN SERPL-MCNC: 18 MG/DL (ref 6–20)
BUN/CREAT SERPL: 17 (ref 9–20)
CALCIUM SERPL-MCNC: 8.9 MG/DL (ref 8.7–10.2)
CHLORIDE SERPL-SCNC: 103 MMOL/L (ref 96–106)
CHOLEST SERPL-MCNC: 170 MG/DL (ref 100–199)
CO2 SERPL-SCNC: 22 MMOL/L (ref 18–29)
CREAT SERPL-MCNC: 1.03 MG/DL (ref 0.76–1.27)
EST. AVERAGE GLUCOSE BLD GHB EST-MCNC: 105 MG/DL
GLOBULIN SER CALC-MCNC: 2.8 G/DL (ref 1.5–4.5)
GLUCOSE SERPL-MCNC: 104 MG/DL (ref 65–99)
HBA1C MFR BLD: 5.3 % (ref 4.8–5.6)
HDLC SERPL-MCNC: 47 MG/DL
INTERPRETATION, 910389: NORMAL
LDLC SERPL CALC-MCNC: 107 MG/DL (ref 0–99)
Lab: NORMAL
POTASSIUM SERPL-SCNC: 4.3 MMOL/L (ref 3.5–5.2)
PROT SERPL-MCNC: 7.5 G/DL (ref 6–8.5)
SODIUM SERPL-SCNC: 141 MMOL/L (ref 134–144)
TRIGL SERPL-MCNC: 80 MG/DL (ref 0–149)
VLDLC SERPL CALC-MCNC: 16 MG/DL (ref 5–40)

## 2017-08-31 NOTE — PROGRESS NOTES
The liver  Test is normal now. The cholesterol is a little higher than 7 months ago  The blood sugar test is normal    [facility] job overall!

## 2017-09-06 NOTE — PROGRESS NOTES
Spoke wit patient and advised of lab results. Patient verbalized understanding and had no questions at this time.

## 2017-10-10 DIAGNOSIS — F41.9 ANXIETY: ICD-10-CM

## 2017-10-12 RX ORDER — CITALOPRAM 10 MG/1
10 TABLET ORAL DAILY
Qty: 30 TAB | Refills: 5 | Status: SHIPPED | OUTPATIENT
Start: 2017-10-12 | End: 2018-01-05 | Stop reason: SDUPTHER

## 2017-10-12 RX ORDER — CLONAZEPAM 0.5 MG/1
0.5 TABLET ORAL
Qty: 60 TAB | Refills: 1 | OUTPATIENT
Start: 2017-10-12

## 2017-10-12 NOTE — TELEPHONE ENCOUNTER
Attempted to contact pt and advise of celexa being approved for refills and Klonopin being denied due to provider has never prescribed this medication for patient. Unable to LVM due to VM being full.

## 2017-11-01 DIAGNOSIS — E11.65 TYPE 2 DIABETES MELLITUS WITH HYPERGLYCEMIA, WITHOUT LONG-TERM CURRENT USE OF INSULIN (HCC): ICD-10-CM

## 2017-11-01 RX ORDER — METFORMIN HYDROCHLORIDE 500 MG/1
TABLET ORAL
Qty: 180 TAB | Refills: 1 | Status: SHIPPED | OUTPATIENT
Start: 2017-11-01 | End: 2019-12-04

## 2017-12-16 ENCOUNTER — HOSPITAL ENCOUNTER (EMERGENCY)
Age: 42
Discharge: HOME | End: 2017-12-17
Attending: EMERGENCY MEDICINE
Payer: COMMERCIAL

## 2017-12-16 DIAGNOSIS — R10.13 ABDOMINAL PAIN, EPIGASTRIC: ICD-10-CM

## 2017-12-16 DIAGNOSIS — R07.9 ACUTE CHEST PAIN: Primary | ICD-10-CM

## 2017-12-16 DIAGNOSIS — F43.0 STRESS REACTION: ICD-10-CM

## 2017-12-16 PROCEDURE — 96374 THER/PROPH/DIAG INJ IV PUSH: CPT

## 2017-12-16 PROCEDURE — 94762 N-INVAS EAR/PLS OXIMTRY CONT: CPT

## 2017-12-16 PROCEDURE — 99285 EMERGENCY DEPT VISIT HI MDM: CPT

## 2017-12-16 RX ORDER — SODIUM CHLORIDE 0.9 % (FLUSH) 0.9 %
5-10 SYRINGE (ML) INJECTION AS NEEDED
Status: DISCONTINUED | OUTPATIENT
Start: 2017-12-16 | End: 2017-12-17 | Stop reason: HOSPADM

## 2017-12-16 RX ORDER — SODIUM CHLORIDE 0.9 % (FLUSH) 0.9 %
5-10 SYRINGE (ML) INJECTION EVERY 8 HOURS
Status: DISCONTINUED | OUTPATIENT
Start: 2017-12-17 | End: 2017-12-17 | Stop reason: HOSPADM

## 2017-12-17 ENCOUNTER — APPOINTMENT (OUTPATIENT)
Dept: GENERAL RADIOLOGY | Age: 42
End: 2017-12-17
Attending: EMERGENCY MEDICINE
Payer: COMMERCIAL

## 2017-12-17 VITALS
DIASTOLIC BLOOD PRESSURE: 81 MMHG | HEIGHT: 68 IN | RESPIRATION RATE: 16 BRPM | BODY MASS INDEX: 38.04 KG/M2 | WEIGHT: 251 LBS | OXYGEN SATURATION: 96 % | TEMPERATURE: 98.3 F | HEART RATE: 78 BPM | SYSTOLIC BLOOD PRESSURE: 117 MMHG

## 2017-12-17 LAB
ALBUMIN SERPL-MCNC: 3.5 G/DL (ref 3.5–5)
ALBUMIN/GLOB SERPL: 1 {RATIO} (ref 1.1–2.2)
ALP SERPL-CCNC: 96 U/L (ref 45–117)
ALT SERPL-CCNC: 28 U/L (ref 12–78)
ANION GAP SERPL CALC-SCNC: 10 MMOL/L (ref 5–15)
AST SERPL-CCNC: 13 U/L (ref 15–37)
BASOPHILS # BLD: 0 K/UL (ref 0–0.1)
BASOPHILS NFR BLD: 1 % (ref 0–1)
BILIRUB SERPL-MCNC: 0.4 MG/DL (ref 0.2–1)
BUN SERPL-MCNC: 18 MG/DL (ref 6–20)
BUN/CREAT SERPL: 14 (ref 12–20)
CALCIUM SERPL-MCNC: 8.8 MG/DL (ref 8.5–10.1)
CHLORIDE SERPL-SCNC: 106 MMOL/L (ref 97–108)
CO2 SERPL-SCNC: 27 MMOL/L (ref 21–32)
CREAT SERPL-MCNC: 1.3 MG/DL (ref 0.7–1.3)
D DIMER PPP FEU-MCNC: 0.36 MG/L FEU (ref 0–0.65)
EOSINOPHIL # BLD: 0.1 K/UL (ref 0–0.4)
EOSINOPHIL NFR BLD: 1 % (ref 0–7)
ERYTHROCYTE [DISTWIDTH] IN BLOOD BY AUTOMATED COUNT: 13.2 % (ref 11.5–14.5)
GLOBULIN SER CALC-MCNC: 3.5 G/DL (ref 2–4)
GLUCOSE SERPL-MCNC: 107 MG/DL (ref 65–100)
HCT VFR BLD AUTO: 42.8 % (ref 36.6–50.3)
HGB BLD-MCNC: 14.7 G/DL (ref 12.1–17)
LIPASE SERPL-CCNC: 177 U/L (ref 73–393)
LYMPHOCYTES # BLD: 1.9 K/UL (ref 0.8–3.5)
LYMPHOCYTES NFR BLD: 29 % (ref 12–49)
MCH RBC QN AUTO: 29.8 PG (ref 26–34)
MCHC RBC AUTO-ENTMCNC: 34.3 G/DL (ref 30–36.5)
MCV RBC AUTO: 86.6 FL (ref 80–99)
MONOCYTES # BLD: 0.6 K/UL (ref 0–1)
MONOCYTES NFR BLD: 9 % (ref 5–13)
NEUTS SEG # BLD: 3.9 K/UL (ref 1.8–8)
NEUTS SEG NFR BLD: 60 % (ref 32–75)
PLATELET # BLD AUTO: 197 K/UL (ref 150–400)
POTASSIUM SERPL-SCNC: 3.7 MMOL/L (ref 3.5–5.1)
PROT SERPL-MCNC: 7 G/DL (ref 6.4–8.2)
RBC # BLD AUTO: 4.94 M/UL (ref 4.1–5.7)
SODIUM SERPL-SCNC: 143 MMOL/L (ref 136–145)
TROPONIN I SERPL-MCNC: <0.04 NG/ML
TROPONIN I SERPL-MCNC: <0.04 NG/ML
WBC # BLD AUTO: 6.5 K/UL (ref 4.1–11.1)

## 2017-12-17 PROCEDURE — 71010 XR CHEST PORT: CPT

## 2017-12-17 PROCEDURE — 36415 COLL VENOUS BLD VENIPUNCTURE: CPT | Performed by: EMERGENCY MEDICINE

## 2017-12-17 PROCEDURE — 93005 ELECTROCARDIOGRAM TRACING: CPT

## 2017-12-17 PROCEDURE — 96374 THER/PROPH/DIAG INJ IV PUSH: CPT

## 2017-12-17 PROCEDURE — 80053 COMPREHEN METABOLIC PANEL: CPT | Performed by: EMERGENCY MEDICINE

## 2017-12-17 PROCEDURE — 85025 COMPLETE CBC W/AUTO DIFF WBC: CPT | Performed by: EMERGENCY MEDICINE

## 2017-12-17 PROCEDURE — 83690 ASSAY OF LIPASE: CPT | Performed by: EMERGENCY MEDICINE

## 2017-12-17 PROCEDURE — 74011250637 HC RX REV CODE- 250/637: Performed by: EMERGENCY MEDICINE

## 2017-12-17 PROCEDURE — 74011000250 HC RX REV CODE- 250: Performed by: EMERGENCY MEDICINE

## 2017-12-17 PROCEDURE — 85379 FIBRIN DEGRADATION QUANT: CPT | Performed by: EMERGENCY MEDICINE

## 2017-12-17 PROCEDURE — 84484 ASSAY OF TROPONIN QUANT: CPT | Performed by: EMERGENCY MEDICINE

## 2017-12-17 RX ORDER — LIDOCAINE HYDROCHLORIDE 20 MG/ML
SOLUTION OROPHARYNGEAL
Status: DISCONTINUED
Start: 2017-12-17 | End: 2017-12-17 | Stop reason: HOSPADM

## 2017-12-17 RX ORDER — HYDROXYZINE 25 MG/1
25 TABLET, FILM COATED ORAL
Status: COMPLETED | OUTPATIENT
Start: 2017-12-17 | End: 2017-12-17

## 2017-12-17 RX ORDER — FAMOTIDINE 10 MG/ML
20 INJECTION INTRAVENOUS
Status: COMPLETED | OUTPATIENT
Start: 2017-12-17 | End: 2017-12-17

## 2017-12-17 RX ORDER — MAG HYDROX/ALUMINUM HYD/SIMETH 200-200-20
SUSPENSION, ORAL (FINAL DOSE FORM) ORAL
Status: DISCONTINUED
Start: 2017-12-17 | End: 2017-12-17 | Stop reason: HOSPADM

## 2017-12-17 RX ADMIN — LIDOCAINE HYDROCHLORIDE 40 ML: 20 SOLUTION ORAL; TOPICAL at 01:26

## 2017-12-17 RX ADMIN — HYDROXYZINE HYDROCHLORIDE 25 MG: 25 TABLET ORAL at 00:35

## 2017-12-17 RX ADMIN — FAMOTIDINE 20 MG: 10 INJECTION, SOLUTION INTRAVENOUS at 00:34

## 2017-12-17 NOTE — ED PROVIDER NOTES
HPI Comments: 38 y.o. male with past medical history significant for MI, HTN, Vertigo, Asthma, GERD, Chronic pain who presents from home via EMS with chief complaint of chest pain. Pt reports \"couple hours\" hx of substernal chest to epigastric pain. He describes the pain as \"knot-like\" pain which has remained constant since onset. The pain began during an argument. The chest pain is accompanied by nause and lightheadedness. He was given 4 baby aspirin by EMS of which denies relief. Pt reports hx of MI , his last stress test was in 2010. Pt denies any other acute medical concerns at this time. Social hx: Non-smoker. Occasional EtOH use. None tonight. No illicit drug use. PCP: Noé Guerra MD    Note written by Fareed Mayers, as dictated by Shayy Romo DO 12:10 AM    The history is provided by the patient. No  was used. Past Medical History:   Diagnosis Date    Asthma     Bronchitis     Chronic pain     Depression     Gastrointestinal disorder     GERD    Otitis media     Sinus infection     Vertigo 3/4/2013       No past surgical history on file. Family History:   Problem Relation Age of Onset    Kidney Disease Mother     Hypertension Mother     Diabetes Mother     Cancer Mother      uterine    Elevated Lipids Mother     Coronary Artery Disease Father      premature    Hypertension Father     Diabetes Father     High Cholesterol Father     Cancer Father      skin    Elevated Lipids Father     Heart Disease Father     Cancer Sister      uterine    Hypertension Brother     High Cholesterol Brother     Coronary Artery Disease Paternal Uncle     Coronary Artery Disease Paternal Grandmother     Coronary Artery Disease Paternal Grandfather        Social History     Social History    Marital status:      Spouse name: N/A    Number of children: N/A    Years of education: N/A     Occupational History    Not on file.      Social History Main Topics    Smoking status: Former Smoker    Smokeless tobacco: Never Used    Alcohol use Yes      Comment: rare    Drug use: No    Sexual activity: Yes     Partners: Female     Other Topics Concern    Not on file     Social History Narrative     ALLERGIES: Ativan [lorazepam] and Avelox [moxifloxacin]    Review of Systems   Constitutional: Negative for appetite change, chills, diaphoresis, fever and unexpected weight change. HENT: Negative for ear pain, hearing loss, rhinorrhea and trouble swallowing. Eyes: Negative for pain and visual disturbance. Respiratory: Negative for cough, chest tightness and shortness of breath. Cardiovascular: Positive for chest pain (substernal). Negative for palpitations. Gastrointestinal: Positive for abdominal pain (epigastric) and nausea. Negative for abdominal distention, blood in stool and vomiting. Genitourinary: Negative for dysuria, hematuria and urgency. Musculoskeletal: Negative for back pain and myalgias. Skin: Negative for rash. Neurological: Positive for light-headedness. Negative for dizziness, syncope, weakness and numbness. Psychiatric/Behavioral: Negative for confusion and suicidal ideas. All other systems reviewed and are negative. Vitals:    12/16/17 2359   BP: 140/88   Pulse: 85   Resp: 19   Temp: 98.3 °F (36.8 °C)   SpO2: 96%   Weight: 113.9 kg (251 lb)   Height: 5' 8\" (1.727 m)            Physical Exam   Constitutional: He is oriented to person, place, and time. He appears well-developed and well-nourished. No distress. HENT:   Head: Normocephalic and atraumatic. Right Ear: External ear normal.   Left Ear: External ear normal.   Nose: Nose normal.   Mouth/Throat: Oropharynx is clear and moist. No oropharyngeal exudate. Eyes: Conjunctivae and EOM are normal. Pupils are equal, round, and reactive to light. Right eye exhibits no discharge. Left eye exhibits no discharge. No scleral icterus.    Neck: Normal range of motion. Neck supple. No JVD present. No tracheal deviation present. Cardiovascular: Normal rate, regular rhythm, normal heart sounds and intact distal pulses. Exam reveals no gallop and no friction rub. No murmur heard. Pulmonary/Chest: Effort normal and breath sounds normal. No stridor. No respiratory distress. He has no decreased breath sounds. He has no wheezes. He has no rhonchi. He has no rales. He exhibits no tenderness. Abdominal: Soft. Bowel sounds are normal. He exhibits no distension. There is tenderness in the epigastric area. There is no rebound and no guarding. Musculoskeletal: Normal range of motion. He exhibits no edema or tenderness. No calf swelling or tenderness   Neurological: He is alert and oriented to person, place, and time. He has normal strength and normal reflexes. No cranial nerve deficit or sensory deficit. He exhibits normal muscle tone. Coordination normal. GCS eye subscore is 4. GCS verbal subscore is 5. GCS motor subscore is 6. Skin: Skin is warm and dry. No rash noted. He is not diaphoretic. No erythema. No pallor. Psychiatric: He has a normal mood and affect. His behavior is normal. Judgment and thought content normal.   Nursing note and vitals reviewed.    Note written by Fareed Mayers, as dictated by Shayy Romo DO 12:12 AM    MDM  Number of Diagnoses or Management Options  Abdominal pain, epigastric:   Acute chest pain:   Stress reaction:      Amount and/or Complexity of Data Reviewed  Clinical lab tests: ordered and reviewed  Tests in the radiology section of CPT®: ordered and reviewed  Tests in the medicine section of CPT®: ordered and reviewed  Independent visualization of images, tracings, or specimens: yes (ekg)    Risk of Complications, Morbidity, and/or Mortality  Presenting problems: moderate  Diagnostic procedures: low  Management options: low    Patient Progress  Patient progress: stable    ED Course       Procedures  Chief Complaint   Patient presents with    Chest Pain (Angina)       The patients presenting problems have been discussed, and they are in agreement with the care plan formulated and outlined with them. I have encouraged them to ask questions as they arise throughout their visit. MEDICATIONS GIVEN:  Medications   sodium chloride (NS) flush 5-10 mL (not administered)   sodium chloride (NS) flush 5-10 mL (not administered)   alum-mag hydroxide-simeth (MYLANTA) 200-200-20 mg/5 mL oral suspension (not administered)   lidocaine (XYLOCAINE) 2 % viscous solution (not administered)   famotidine (PF) (PEPCID) injection 20 mg (20 mg IntraVENous Given 12/17/17 0034)   hydrOXYzine HCl (ATARAX) tablet 25 mg (25 mg Oral Given 12/17/17 0035)   mylanta/viscous lidocaine (BASSEM)(GI COCKTAIL) (40 mL Oral Given 12/17/17 0126)       LABS REVIEWED:  Recent Results (from the past 24 hour(s))   EKG, 12 LEAD, INITIAL    Collection Time: 12/17/17 12:03 AM   Result Value Ref Range    Ventricular Rate 85 BPM    Atrial Rate 85 BPM    P-R Interval 166 ms    QRS Duration 98 ms    Q-T Interval 382 ms    QTC Calculation (Bezet) 454 ms    Calculated P Axis 27 degrees    Calculated R Axis -4 degrees    Calculated T Axis 12 degrees    Diagnosis       Normal sinus rhythm  Normal ECG  When compared with ECG of 30-MAY-2014 14:29,  No significant change was found     CBC WITH AUTOMATED DIFF    Collection Time: 12/17/17 12:07 AM   Result Value Ref Range    WBC 6.5 4.1 - 11.1 K/uL    RBC 4.94 4.10 - 5.70 M/uL    HGB 14.7 12.1 - 17.0 g/dL    HCT 42.8 36.6 - 50.3 %    MCV 86.6 80.0 - 99.0 FL    MCH 29.8 26.0 - 34.0 PG    MCHC 34.3 30.0 - 36.5 g/dL    RDW 13.2 11.5 - 14.5 %    PLATELET 333 978 - 849 K/uL    NEUTROPHILS 60 32 - 75 %    LYMPHOCYTES 29 12 - 49 %    MONOCYTES 9 5 - 13 %    EOSINOPHILS 1 0 - 7 %    BASOPHILS 1 0 - 1 %    ABS. NEUTROPHILS 3.9 1.8 - 8.0 K/UL    ABS. LYMPHOCYTES 1.9 0.8 - 3.5 K/UL    ABS. MONOCYTES 0.6 0.0 - 1.0 K/UL    ABS.  EOSINOPHILS 0.1 0.0 - 0.4 K/UL    ABS. BASOPHILS 0.0 0.0 - 0.1 K/UL   METABOLIC PANEL, COMPREHENSIVE    Collection Time: 12/17/17 12:07 AM   Result Value Ref Range    Sodium 143 136 - 145 mmol/L    Potassium 3.7 3.5 - 5.1 mmol/L    Chloride 106 97 - 108 mmol/L    CO2 27 21 - 32 mmol/L    Anion gap 10 5 - 15 mmol/L    Glucose 107 (H) 65 - 100 mg/dL    BUN 18 6 - 20 MG/DL    Creatinine 1.30 0.70 - 1.30 MG/DL    BUN/Creatinine ratio 14 12 - 20      GFR est AA >60 >60 ml/min/1.73m2    GFR est non-AA >60 >60 ml/min/1.73m2    Calcium 8.8 8.5 - 10.1 MG/DL    Bilirubin, total 0.4 0.2 - 1.0 MG/DL    ALT (SGPT) 28 12 - 78 U/L    AST (SGOT) 13 (L) 15 - 37 U/L    Alk.  phosphatase 96 45 - 117 U/L    Protein, total 7.0 6.4 - 8.2 g/dL    Albumin 3.5 3.5 - 5.0 g/dL    Globulin 3.5 2.0 - 4.0 g/dL    A-G Ratio 1.0 (L) 1.1 - 2.2     LIPASE    Collection Time: 12/17/17 12:07 AM   Result Value Ref Range    Lipase 177 73 - 393 U/L   TROPONIN I    Collection Time: 12/17/17 12:07 AM   Result Value Ref Range    Troponin-I, Qt. <0.04 <0.05 ng/mL   D DIMER    Collection Time: 12/17/17 12:07 AM   Result Value Ref Range    D-dimer 0.36 0.00 - 0.65 mg/L FEU   TROPONIN I    Collection Time: 12/17/17  2:07 AM   Result Value Ref Range    Troponin-I, Qt. <0.04 <0.05 ng/mL       VITAL SIGNS:  Patient Vitals for the past 12 hrs:   Temp Pulse Resp BP SpO2   12/17/17 0300 - 78 16 117/81 96 %   12/17/17 0245 - 87 15 121/79 100 %   12/17/17 0230 - 78 13 128/81 97 %   12/17/17 0215 - 78 16 120/75 96 %   12/17/17 0200 - 78 15 126/77 96 %   12/17/17 0145 - 79 16 117/76 97 %   12/17/17 0130 - 87 13 119/83 97 %   12/17/17 0115 - 85 17 134/82 98 %   12/17/17 0100 - 77 15 121/75 97 %   12/17/17 0045 - 84 16 128/82 95 %   12/17/17 0030 - 86 23 132/78 96 %   12/17/17 0015 - 86 25 131/81 96 %   12/17/17 0000 - - - 140/88 96 %   12/16/17 2359 98.3 °F (36.8 °C) 85 19 140/88 96 %       RADIOLOGY RESULTS:  The following have been ordered and reviewed:  XR CHEST PORT   Final Result Study Result      EXAM:  XR CHEST PORT     INDICATION:  Chest pain.     COMPARISON: 5/30/2014     TECHNIQUE: Portable AP upright chest view at 0020 hours     FINDINGS: Cardiac monitoring wires overlie the thorax. The cardiomediastinal  contours are stable. The pulmonary vasculature is within normal limits.      The lungs and pleural spaces are clear. There is no pneumothorax. The bones and  upper abdomen are stable.     IMPRESSION  IMPRESSION:     No acute process. Stable exam.     ED EKG interpretation:  Rhythm: normal sinus rhythm; and regular . Rate (approx.): 85; Axis: normal; P wave: normal; QRS interval: normal ; ST/T wave: normal; Other findings: normal. This EKG was interpreted by Ed Hendrix DO,ED Provider. PROGRESS NOTES:  Second trop negative. Discussed results and plan with patient. Patient will be discharged home with PCP followup. Patient instructed to return to the emergency room for any worsening symptoms or any other concerns. DIAGNOSIS:    1. Acute chest pain    2. Abdominal pain, epigastric    3. Stress reaction        PLAN:  Follow-up Information     Follow up With Details Comments Contact Info    Skyla Younger MD Schedule an appointment as soon as possible for a visit  [address] [place], [place] [id number] [phone]      [facility] EMERGENCY DEPT  If symptoms worsen [address] [facility]  [phone]        Discharge Medication List as of 12/17/2017  2:47 AM      CONTINUE these medications which have NOT CHANGED    Details   metFORMIN (GLUCOPHAGE) 500 mg tablet TAKE 1 TABLET BY MOUTH TWICE PER DAY WITH MEALS, Normal, Disp-180 Tab, R-1      citalopram (CELEXA) 10 mg tablet Take 1 Tab by mouth daily. , Normal, Disp-30 Tab, R-5      amLODIPine (NORVASC) 5 mg tablet TAKE 1 TABLET BY MOUTH EVERY DAY, Normal, Disp-30 Tab, R-6      topiramate (TOPAMAX) 25 mg tablet TAKE 1 TABLET BY MOUTH EVERY DAY WITH DINNER, Normal, Disp-30 Tab, R-6 Blood-Glucose Meter monitoring kit E11.65, Print, Disp-1 Kit, R-0      Lancets misc E11.65 Check blood sugar once daily, Print, Disp-100 Each, R-3      glucose blood VI test strips (BLOOD GLUCOSE TEST) strip E11.65 Check blood sugar daily, Print, Disp-100 Strip, R-3      fluticasone (FLONASE) 50 mcg/actuation nasal spray 2 Sprays by Both Nostrils route daily. , Print, Disp-1 Bottle, R-3      acetaminophen (TYLENOL) 325 mg tablet Take  by mouth every four (4) hours as needed for Pain., Historical Med      Neomycin-Polymyxin-Pramoxine (NEOSPORIN + PAIN RELIEF) 3.5-10,000-10 mg-unit-mg/gram topical cream Apply  to affected area two (2) times a day., No Print, Disp-15 g, R-0      clonazePAM (KLONOPIN) 0.5 mg tablet Take 1 tablet by mouth two (2) times daily as needed. , Print, Disp-60 tablet, R-1      aspirin 81 mg tablet Take 81 mg by mouth. Historical Med, 81 mg               ED COURSE: The patients hospital course has been uncomplicated.

## 2017-12-17 NOTE — ED TRIAGE NOTES
Pt. Lucy Warren in by EMS for chest pain, feels like a tennis ball in chest, states has been stressed at home, just drove from [facility] with very little stops.

## 2017-12-17 NOTE — DISCHARGE INSTRUCTIONS
We hope that we have addressed all of your medical concerns. The examination and treatment you received in the Emergency Department were for an emergent problem and were not intended as complete care. It is important that you follow up with your healthcare provider(s) for ongoing care. If your symptoms worsen or do not improve as expected, and you are unable to reach your usual health care provider(s), you should return to the Emergency Department. Today's healthcare is undergoing tremendous change, and patient satisfaction surveys are one of the many tools to assess the quality of medical care. You may receive a survey from the [employer] regarding your experience in the Emergency Department. I hope that your experience has been completely positive, particularly the medical care that I provided. As such, please participate in the survey; anything less than excellent does not meet my expectations or intentions. [facility] and 8 [facility] participate in nationally recognized quality of care measures. If your blood pressure is greater than 120/80, as reported below, we urge that you seek medical care to address the potential of high blood pressure, commonly known as hypertension. Hypertension can be hereditary or can be caused by certain medical conditions, pain, stress, or \"white coat syndrome. \"       Please make an appointment with your health care provider(s) for follow up of your Emergency Department visit. VITALS:   Patient Vitals for the past 8 hrs:   Temp Pulse Resp BP SpO2   12/17/17 0115 - 85 17 134/82 98 %   12/17/17 0100 - 77 15 121/75 97 %   12/17/17 0045 - 84 16 128/82 95 %   12/17/17 0030 - 86 23 132/78 96 %   12/17/17 0015 - 86 25 131/81 96 %   12/17/17 0000 - - - 140/88 96 %   12/16/17 2359 98.3 °F (36.8 °C) 85 19 140/88 96 %          Thank you for allowing us to provide you with medical care today.   We realize that you have many choices for your emergency care needs. Please choose us in the future for any continued health care needs. [address]   Office: [phone]            Recent Results (from the past 24 hour(s))   EKG, 12 LEAD, INITIAL    Collection Time: 12/17/17 12:03 AM   Result Value Ref Range    Ventricular Rate 85 BPM    Atrial Rate 85 BPM    P-R Interval 166 ms    QRS Duration 98 ms    Q-T Interval 382 ms    QTC Calculation (Bezet) 454 ms    Calculated P Axis 27 degrees    Calculated R Axis -4 degrees    Calculated T Axis 12 degrees    Diagnosis       Normal sinus rhythm  Normal ECG  When compared with ECG of 30-MAY-2014 14:29,  No significant change was found     CBC WITH AUTOMATED DIFF    Collection Time: 12/17/17 12:07 AM   Result Value Ref Range    WBC 6.5 4.1 - 11.1 K/uL    RBC 4.94 4.10 - 5.70 M/uL    HGB 14.7 12.1 - 17.0 g/dL    HCT 42.8 36.6 - 50.3 %    MCV 86.6 80.0 - 99.0 FL    MCH 29.8 26.0 - 34.0 PG    MCHC 34.3 30.0 - 36.5 g/dL    RDW 13.2 11.5 - 14.5 %    PLATELET 885 221 - 177 K/uL    NEUTROPHILS 60 32 - 75 %    LYMPHOCYTES 29 12 - 49 %    MONOCYTES 9 5 - 13 %    EOSINOPHILS 1 0 - 7 %    BASOPHILS 1 0 - 1 %    ABS. NEUTROPHILS 3.9 1.8 - 8.0 K/UL    ABS. LYMPHOCYTES 1.9 0.8 - 3.5 K/UL    ABS. MONOCYTES 0.6 0.0 - 1.0 K/UL    ABS. EOSINOPHILS 0.1 0.0 - 0.4 K/UL    ABS.  BASOPHILS 0.0 0.0 - 0.1 K/UL   METABOLIC PANEL, COMPREHENSIVE    Collection Time: 12/17/17 12:07 AM   Result Value Ref Range    Sodium 143 136 - 145 mmol/L    Potassium 3.7 3.5 - 5.1 mmol/L    Chloride 106 97 - 108 mmol/L    CO2 27 21 - 32 mmol/L    Anion gap 10 5 - 15 mmol/L    Glucose 107 (H) 65 - 100 mg/dL    BUN 18 6 - 20 MG/DL    Creatinine 1.30 0.70 - 1.30 MG/DL    BUN/Creatinine ratio 14 12 - 20      GFR est AA >60 >60 ml/min/1.73m2    GFR est non-AA >60 >60 ml/min/1.73m2    Calcium 8.8 8.5 - 10.1 MG/DL    Bilirubin, total 0.4 0.2 - 1.0 MG/DL    ALT (SGPT) 28 12 - 78 U/L    AST (SGOT) 13 (L) 15 - 37 U/L    Alk. phosphatase 96 45 - 117 U/L    Protein, total 7.0 6.4 - 8.2 g/dL    Albumin 3.5 3.5 - 5.0 g/dL    Globulin 3.5 2.0 - 4.0 g/dL    A-G Ratio 1.0 (L) 1.1 - 2.2     LIPASE    Collection Time: 12/17/17 12:07 AM   Result Value Ref Range    Lipase 177 73 - 393 U/L   TROPONIN I    Collection Time: 12/17/17 12:07 AM   Result Value Ref Range    Troponin-I, Qt. <0.04 <0.05 ng/mL   D DIMER    Collection Time: 12/17/17 12:07 AM   Result Value Ref Range    D-dimer 0.36 0.00 - 0.65 mg/L FEU   TROPONIN I    Collection Time: 12/17/17  2:07 AM   Result Value Ref Range    Troponin-I, Qt. <0.04 <0.05 ng/mL       Xr Chest Port    Result Date: 12/17/2017  EXAM:  XR CHEST PORT INDICATION:  Chest pain. COMPARISON: 5/30/2014 TECHNIQUE: Portable AP upright chest view at 0020 hours FINDINGS: Cardiac monitoring wires overlie the thorax. The cardiomediastinal contours are stable. The pulmonary vasculature is within normal limits. The lungs and pleural spaces are clear. There is no pneumothorax. The bones and upper abdomen are stable. IMPRESSION: No acute process. Stable exam.              Chest Pain: Care Instructions  Your Care Instructions    There are many things that can cause chest pain. Some are not serious and will get better on their own in a few days. But some kinds of chest pain need more testing and treatment. Your doctor may have recommended a follow-up visit in the next 8 to 12 hours. If you are not getting better, you may need more tests or treatment. Even though your doctor has released you, you still need to watch for any problems. The doctor carefully checked you, but sometimes problems can develop later. If you have new symptoms or if your symptoms do not get better, get medical care right away.   If you have worse or different chest pain or pressure that lasts more than 5 minutes or you passed out (lost consciousness), call 911 or seek other emergency help right away.   A medical visit is only one step in your treatment. Even if you feel better, you still need to do what your doctor recommends, such as going to all suggested follow-up appointments and taking medicines exactly as directed. This will help you recover and help prevent future problems. How can you care for yourself at home? · Rest until you feel better. · Take your medicine exactly as prescribed. Call your doctor if you think you are having a problem with your medicine. · Do not drive after taking a prescription pain medicine. When should you call for help? Call 911 if:  ? · You passed out (lost consciousness). ? · You have severe difficulty breathing. ? · You have symptoms of a heart attack. These may include:  ¨ Chest pain or pressure, or a strange feeling in your chest.  ¨ Sweating. ¨ Shortness of breath. ¨ Nausea or vomiting. ¨ Pain, pressure, or a strange feeling in your back, neck, jaw, or upper belly or in one or both shoulders or arms. ¨ Lightheadedness or sudden weakness. ¨ A fast or irregular heartbeat. After you call 911, the  may tell you to chew 1 adult-strength or 2 to 4 low-dose aspirin. Wait for an ambulance. Do not try to drive yourself. ?Call your doctor today if:  ? · You have any trouble breathing. ? · Your chest pain gets worse. ? · You are dizzy or lightheaded, or you feel like you may faint. ? · You are not getting better as expected. ? · You are having new or different chest pain. Where can you learn more? Go to http://donald-liana.info/. Enter A120 in the search box to learn more about \"Chest Pain: Care Instructions. \"  Current as of: March 20, 2017  Content Version: 11.4  © 4923-1361 [employer]. Care instructions adapted under license by [employer] (which disclaims liability or warranty for this information).  If you have questions about a medical condition or this instruction, always ask your healthcare professional. [address] any warranty or liability for your use of this information. Abdominal Pain: Care Instructions  Your Care Instructions    Abdominal pain has many possible causes. Some aren't serious and get better on their own in a few days. Others need more testing and treatment. If your pain continues or gets worse, you need to be rechecked and may need more tests to find out what is wrong. You may need surgery to correct the problem. Don't ignore new symptoms, such as fever, nausea and vomiting, urination problems, pain that gets worse, and dizziness. These may be signs of a more serious problem. Your doctor may have recommended a follow-up visit in the next 8 to 12 hours. If you are not getting better, you may need more tests or treatment. The doctor has checked you carefully, but problems can develop later. If you notice any problems or new symptoms, get medical treatment right away. Follow-up care is a key part of your treatment and safety. Be sure to make and go to all appointments, and call your doctor if you are having problems. It's also a good idea to know your test results and keep a list of the medicines you take. How can you care for yourself at home? · Rest until you feel better. · To prevent dehydration, drink plenty of fluids, enough so that your urine is light yellow or clear like water. Choose water and other caffeine-free clear liquids until you feel better. If you have kidney, heart, or liver disease and have to limit fluids, talk with your doctor before you increase the amount of fluids you drink. · If your stomach is upset, eat mild foods, such as rice, dry toast or crackers, bananas, and applesauce. Try eating several small meals instead of two or three large ones. · Wait until 48 hours after all symptoms have gone away before you have spicy foods, alcohol, and drinks that contain caffeine. · Do not eat foods that are high in fat.   · Avoid anti-inflammatory medicines such as aspirin, ibuprofen (Advil, Motrin), and naproxen (Aleve). These can cause stomach upset. Talk to your doctor if you take daily aspirin for another health problem. When should you call for help? Call 911 anytime you think you may need emergency care. For example, call if:  ? · You passed out (lost consciousness). ? · You pass maroon or very bloody stools. ? · You vomit blood or what looks like coffee grounds. ? · You have new, severe belly pain. ?Call your doctor now or seek immediate medical care if:  ? · Your pain gets worse, especially if it becomes focused in one area of your belly. ? · You have a new or higher fever. ? · Your stools are black and look like tar, or they have streaks of blood. ? · You have unexpected vaginal bleeding. ? · You have symptoms of a urinary tract infection. These may include:  ¨ Pain when you urinate. ¨ Urinating more often than usual.  ¨ Blood in your urine. ? · You are dizzy or lightheaded, or you feel like you may faint. ? Watch closely for changes in your health, and be sure to contact your doctor if:  ? · You are not getting better after 1 day (24 hours). Where can you learn more? Go to http://donald-liana.info/. Enter Z614 in the search box to learn more about \"Abdominal Pain: Care Instructions. \"  Current as of: March 20, 2017  Content Version: 11.4  © 8452-2363 [employer]. Care instructions adapted under license by [employer] (which disclaims liability or warranty for this information). If you have questions about a medical condition or this instruction, always ask your healthcare professional. [address] any warranty or liability for your use of this information. Learning About Stress  What is stress? Stress is what you feel when you have to handle more than you are used to.  Stress is a fact of life for most people, and it affects everyone differently. What causes stress for you may not be stressful for someone else. A lot of things can cause stress. You may feel stress when you go on a job interview, take a test, or run a race. This kind of short-term stress is normal and even useful. It can help you if you need to work hard or react quickly. For example, stress can help you finish an important job on time. Stress also can last a long time. Long-term stress is caused by stressful situations or events. Examples of long-term stress include long-term health problems, ongoing problems at work, or conflicts in your family. Long-term stress can harm your health. How does stress affect your health? When you are stressed, your body responds as though you are in danger. It makes hormones that speed up your heart, make you breathe faster, and give you a burst of energy. This is called the fight-or-flight stress response. If the stress is over quickly, your body goes back to normal and no harm is done. But if stress happens too often or lasts too long, it can have bad effects. Long-term stress can make you more likely to get sick, and it can make symptoms of some diseases worse. If you tense up when you are stressed, you may develop neck, shoulder, or low back pain. Stress is linked to high blood pressure and heart disease. Stress also harms your emotional health. It can make you blank, tense, or depressed. Your relationships may suffer, and you may not do well at work or school. What can you do to manage stress? How to relax your mind  · Write. It may help to write about things that are bothering you. This helps you find out how much stress you feel and what is causing it. When you know this, you can find better ways to cope. · Let your feelings out. Talk, laugh, cry, and express anger when you need to. Talking with friends, family, a counselor, or a member of the clergy about your feelings is a healthy way to relieve stress.   · Do something you enjoy. For example, listen to music or go to a movie. Practice your hobby or do volunteer work. · Meditate. This can help you relax, because you are not worrying about what happened before or what may happen in the future. · Do guided imagery. Imagine yourself in any setting that helps you feel calm. You can use audiotapes, books, or a teacher to guide you. How to relax your body  · Do something active. Exercise or activity can help reduce stress. Walking is a great way to get started. Even everyday activities such as housecleaning or yard work can help. · Do breathing exercises. For example:  ¨ From a standing position, bend forward from the waist with your knees slightly bent. Let your arms dangle close to the floor. ¨ Breathe in slowly and deeply as you return to a standing position. Roll up slowly and lift your head last.  ¨ Hold your breath for just a few seconds in the standing position. ¨ Breathe out slowly and bend forward from the waist.  · Try yoga or pramod chi. These techniques combine exercise and meditation. You may need some training at first to learn them. What can you do to prevent stress? · Manage your time. This helps you find time to do the things you want and need to do. · Get enough sleep. Your body recovers from the stresses of the day while you are sleeping. · Get support. Your family, friends, and community can make a difference in how you experience stress. Where can you learn more? Go to http://donlad-liana.info/. Enter F222 in the search box to learn more about \"Learning About Stress. \"  Current as of: July 26, 2016  Content Version: 11.4  © 8884-2708 [employer]. Care instructions adapted under license by [employer] (which disclaims liability or warranty for this information).  If you have questions about a medical condition or this instruction, always ask your healthcare professional. Carole Castro disclaims any warranty or liability for your use of this information.

## 2017-12-19 LAB
ATRIAL RATE: 85 BPM
CALCULATED P AXIS, ECG09: 27 DEGREES
CALCULATED R AXIS, ECG10: -4 DEGREES
CALCULATED T AXIS, ECG11: 12 DEGREES
DIAGNOSIS, 93000: NORMAL
P-R INTERVAL, ECG05: 166 MS
Q-T INTERVAL, ECG07: 382 MS
QRS DURATION, ECG06: 98 MS
QTC CALCULATION (BEZET), ECG08: 454 MS
VENTRICULAR RATE, ECG03: 85 BPM

## 2017-12-22 DIAGNOSIS — E66.01 OBESITY, MORBID, BMI 40.0-49.9 (HCC): ICD-10-CM

## 2017-12-22 RX ORDER — TOPIRAMATE 25 MG/1
TABLET ORAL
Qty: 30 TAB | Refills: 0 | Status: SHIPPED | OUTPATIENT
Start: 2017-12-22 | End: 2018-01-05 | Stop reason: SDUPTHER

## 2017-12-27 ENCOUNTER — OFFICE VISIT (OUTPATIENT)
Dept: FAMILY MEDICINE CLINIC | Age: 42
End: 2017-12-27

## 2017-12-27 VITALS
SYSTOLIC BLOOD PRESSURE: 133 MMHG | RESPIRATION RATE: 19 BRPM | WEIGHT: 259 LBS | HEART RATE: 79 BPM | BODY MASS INDEX: 39.25 KG/M2 | HEIGHT: 68 IN | TEMPERATURE: 98.2 F | DIASTOLIC BLOOD PRESSURE: 84 MMHG | OXYGEN SATURATION: 96 %

## 2017-12-27 DIAGNOSIS — H60.501 ACUTE OTITIS EXTERNA OF RIGHT EAR, UNSPECIFIED TYPE: Primary | ICD-10-CM

## 2017-12-27 DIAGNOSIS — M89.8X8 STERNAL MASS: ICD-10-CM

## 2017-12-27 RX ORDER — NEOMYCIN SULFATE, POLYMYXIN B SULFATE AND HYDROCORTISONE 10; 3.5; 1 MG/ML; MG/ML; [USP'U]/ML
3 SUSPENSION/ DROPS AURICULAR (OTIC) 4 TIMES DAILY
Qty: 10 ML | Refills: 0 | Status: SHIPPED | OUTPATIENT
Start: 2017-12-27 | End: 2017-12-31

## 2017-12-27 NOTE — PATIENT INSTRUCTIONS
Swimmer's Ear: Care Instructions  Your Care Instructions    Swimmer's ear (otitis externa) is inflammation or infection of the ear canal. This is the passage that leads from the outer ear to the eardrum. Any water, sand, or other debris that gets into the ear canal and stays there can cause swimmer's ear. Putting cotton swabs or other items in the ear to clean it can also cause this problem. Swimmer's ear can be very painful. But you can treat the pain and infection with medicines. You should feel better in a few days. Follow-up care is a key part of your treatment and safety. Be sure to make and go to all appointments, and call your doctor if you are having problems. It's also a good idea to know your test results and keep a list of the medicines you take. How can you care for yourself at home? Cleaning and care  · Use antibiotic drops as your doctor directs. · Do not insert ear drops (other than the antibiotic ear drops) or anything else into the ear unless your doctor has told you to. · Avoid getting water in the ear until the problem clears up. Use cotton lightly coated with petroleum jelly as an earplug. Do not use plastic earplugs. · Use a hair dryer set on low to carefully dry the ear after you shower. · To ease ear pain, hold a warm washcloth against your ear. · Take pain medicines exactly as directed. ¨ If the doctor gave you a prescription medicine for pain, take it as prescribed. ¨ If you are not taking a prescription pain medicine, ask your doctor if you can take an over-the-counter medicine. Inserting ear drops  · Warm the drops to body temperature by rolling the container in your hands. Or you can place it in a cup of warm water for a few minutes. · Lie down, with your ear facing up. · Place drops inside the ear. Follow your doctor's instructions (or the directions on the label) for how many drops to use.  Gently wiggle the outer ear or pull the ear up and back to help the drops get into the ear. · It's important to keep the liquid in the ear canal for 3 to 5 minutes. When should you call for help? Call your doctor now or seek immediate medical care if:  ? · You have a new or higher fever. ? · You have new or worse pain, swelling, warmth, or redness around or behind your ear. ? · You have new or increasing pus or blood draining from your ear. ? Watch closely for changes in your health, and be sure to contact your doctor if:  ? · You are not getting better after 2 days (48 hours). Where can you learn more? Go to http://donald-liana.info/. Enter C706 in the search box to learn more about \"Swimmer's Ear: Care Instructions. \"  Current as of: May 12, 2017  Content Version: 11.4  © 1437-6675 [employer]. Care instructions adapted under license by [employer] (which disclaims liability or warranty for this information). If you have questions about a medical condition or this instruction, always ask your healthcare professional. [place] [address] any warranty or liability for your use of this information.

## 2017-12-27 NOTE — MR AVS SNAPSHOT
Visit Information Date & Time Provider Department Dept. Phone Encounter #  
 12/27/2017  2:00 PM Lashonda Live  [facility] [phone] [id number] Follow-up Instructions Return in about 2 weeks (around 1/10/2018). Upcoming Health Maintenance Date Due  
 FOOT EXAM Q1 10/15/1987 MICROALBUMIN Q1 10/15/1987 EYE EXAM RETINAL OR DILATED Q1 10/15/1987 Pneumococcal 19-64 Medium Risk (1 of 1 - PPSV23) 10/15/1996 Influenza Age 5 to Adult 8/1/2017 HEMOGLOBIN A1C Q6M 2/28/2018 LIPID PANEL Q1 8/28/2018 DTaP/Tdap/Td series (2 - Td) 3/26/2020 Allergies as of 12/27/2017  Review Complete On: 12/27/2017 By: Lashonda Live NP Severity Noted Reaction Type Reactions Ativan [Lorazepam]  03/04/2013    Palpitations States is not allergic Avelox [Moxifloxacin]  03/04/2013    Palpitations States is not allergic Current Immunizations  Reviewed on 7/24/2014 Name Date Influenza Vaccine Split 12/1/2010 TD Vaccine 3/26/1999 TDAP Vaccine 3/26/2010 Not reviewed this visit You Were Diagnosed With   
  
 Codes Comments Acute otitis externa of right ear, unspecified type    -  Primary ICD-10-CM: H60.501 ICD-9-CM: 380.10 Sternal mass     ICD-10-CM: M89.9 ICD-9-CM: 733.90 Vitals BP Pulse Temp Resp Height(growth percentile) Weight(growth percentile) 133/84 79 98.2 °F (36.8 °C) (Oral) 19 5' 8\" (1.727 m) 259 lb (117.5 kg) SpO2 BMI Smoking Status 96% 39.38 kg/m2 Former Smoker Vitals History BMI and BSA Data Body Mass Index Body Surface Area  
 39.38 kg/m 2 2.37 m 2 Preferred Pharmacy Pharmacy Name Phone [id number] [address] [facility] [phone] Your Updated Medication List  
  
   
This list is accurate as of: 12/27/17  2:05 PM.  Always use your most recent med list. amLODIPine 5 mg tablet Commonly known as:  Jj Crump TAKE 1 TABLET BY MOUTH EVERY DAY  
  
 aspirin 81 mg tablet Take 81 mg by mouth. Blood-Glucose Meter monitoring kit E11.65  
  
 citalopram 10 mg tablet Commonly known as:  [place] Take 1 Tab by mouth daily. clonazePAM 0.5 mg tablet Commonly known as:  Sahil Garcia Take 1 tablet by mouth two (2) times daily as needed. fluticasone 50 mcg/actuation nasal spray Commonly known as:  Alric Eaves 2 Sprays by Both Nostrils route daily. glucose blood VI test strips strip Commonly known as:  blood glucose test  
E11.65 Check blood sugar daily Lancets Misc E11.65 Check blood sugar once daily  
  
 metFORMIN 500 mg tablet Commonly known as:  GLUCOPHAGE  
TAKE 1 TABLET BY MOUTH TWICE PER DAY WITH MEALS  
  
 neomycin-polymyxin-hydrocortisone (buffered) 3.5-10,000-1 mg/mL-unit/mL-% otic suspension Commonly known as:  Harsha Douglas Administer 3 Drops in right ear four (4) times daily for 10 days. Neomycin-Polymyxin-Pramoxine 3.5-10,000-10 mg-unit-mg/gram topical cream  
Commonly known as:  NEOSPORIN + PAIN RELIEF Apply  to affected area two (2) times a day. topiramate 25 mg tablet Commonly known as:  TOPAMAX TAKE 1 TABLET BY MOUTH EVERY DAY WITH DINNER  
  
 TYLENOL 325 mg tablet Generic drug:  acetaminophen Take  by mouth every four (4) hours as needed for Pain. Prescriptions Sent to Pharmacy Refills  
 neomycin-polymyxin-hydrocortisone, buffered, (PEDIOTIC) 3.5-10,000-1 mg/mL-unit/mL-% otic suspension 0 Sig: Administer 3 Drops in right ear four (4) times daily for 10 days. Class: Normal  
 Pharmacy: [id number] - [place], [address] [facility] Ph #: [phone] Route: Right Ear Follow-up Instructions Return in about 2 weeks (around 1/10/2018). To-Do List   
 12/27/2017 Imaging:  US CHEST Patient Instructions Swimmer's Ear: Care Instructions Your Care Instructions Swimmer's ear (otitis externa) is inflammation or infection of the ear canal. This is the passage that leads from the outer ear to the eardrum. Any water, sand, or other debris that gets into the ear canal and stays there can cause swimmer's ear. Putting cotton swabs or other items in the ear to clean it can also cause this problem. Swimmer's ear can be very painful. But you can treat the pain and infection with medicines. You should feel better in a few days. Follow-up care is a key part of your treatment and safety. Be sure to make and go to all appointments, and call your doctor if you are having problems. It's also a good idea to know your test results and keep a list of the medicines you take. How can you care for yourself at home? Cleaning and care · Use antibiotic drops as your doctor directs. · Do not insert ear drops (other than the antibiotic ear drops) or anything else into the ear unless your doctor has told you to. · Avoid getting water in the ear until the problem clears up. Use cotton lightly coated with petroleum jelly as an earplug. Do not use plastic earplugs. · Use a hair dryer set on low to carefully dry the ear after you shower. · To ease ear pain, hold a warm washcloth against your ear. · Take pain medicines exactly as directed. ¨ If the doctor gave you a prescription medicine for pain, take it as prescribed. ¨ If you are not taking a prescription pain medicine, ask your doctor if you can take an over-the-counter medicine. Inserting ear drops · Warm the drops to body temperature by rolling the container in your hands. Or you can place it in a cup of warm water for a few minutes. · Lie down, with your ear facing up. · Place drops inside the ear. Follow your doctor's instructions (or the directions on the label) for how many drops to use. Gently wiggle the outer ear or pull the ear up and back to help the drops get into the ear. · It's important to keep the liquid in the ear canal for 3 to 5 minutes. When should you call for help? Call your doctor now or seek immediate medical care if: 
? · You have a new or higher fever. ? · You have new or worse pain, swelling, warmth, or redness around or behind your ear. ? · You have new or increasing pus or blood draining from your ear. ? Watch closely for changes in your health, and be sure to contact your doctor if: 
? · You are not getting better after 2 days (48 hours). Where can you learn more? Go to http://donald-liana.info/. Enter C706 in the search box to learn more about \"Swimmer's Ear: Care Instructions. \" Current as of: May 12, 2017 Content Version: 11.4 © 2213-5107 [employer]. Care instructions adapted under license by [employer] (which disclaims liability or warranty for this information). If you have questions about a medical condition or this instruction, always ask your healthcare professional. [place] [address] any warranty or liability for your use of this information. Introducing [facility]! Dear Silvano Adams: 
Thank you for requesting a [facility] account. Our records indicate that you already have an active [facility] account. You can access your account anytime at [email or web address] Did you know that you can access your hospital and ER discharge instructions at any time in [facility]? You can also review all of your test results from your hospital stay or ER visit. Additional Information If you have questions, please visit the Frequently Asked Questions section of the [facility] website at [email or web address] Remember, [facility] is NOT to be used for urgent needs. For medical emergencies, dial 911. Now available from your iPhone and Android! Please provide this summary of care documentation to your next provider. Your primary care clinician is listed as Juan C Serrano. If you have any questions after today's visit, please call [phone].

## 2017-12-27 NOTE — PROGRESS NOTES
Marcelino Clay is a 38 y.o. male who presents with the following complaints:  Chief Complaint   Patient presents with    Ear Pain     right ear       Subjective:    HPI:   C/o 3 week hx of tender lump on the bottom of sternum. It is uncomfortable but has not kept him from being able to go about his normal activities. He can recall no trauma or injury to the area, but reports he often has to lie in awkward positions on hard surfaces for extended periods at his job for the [employer]. No fever or chills. No redness or drainage of the skin. C/o right ear pain x 3 weeks, hurts to manipulate earlobe, has noted increase wax draining from the ear canal, muffled hearing. Has to wear ear plugs most of the day at work. No symptoms on the left side. Pertinent PMH/FH/SH:  Past Medical History:   Diagnosis Date    Asthma     Bronchitis     Chronic pain     Depression     Gastrointestinal disorder     GERD    Otitis media     Sinus infection     Vertigo 3/4/2013     No past surgical history on file.   Family History   Problem Relation Age of Onset    Kidney Disease Mother     Hypertension Mother     Diabetes Mother     Cancer Mother      uterine    Elevated Lipids Mother     Coronary Artery Disease Father      premature    Hypertension Father     Diabetes Father     High Cholesterol Father     Cancer Father      skin    Elevated Lipids Father     Heart Disease Father     Cancer Sister      uterine    Hypertension Brother     High Cholesterol Brother     Coronary Artery Disease Paternal Uncle     Coronary Artery Disease Paternal Grandmother     Coronary Artery Disease Paternal Grandfather      Social History     Social History    Marital status:      Spouse name: N/A    Number of children: N/A    Years of education: N/A     Social History Main Topics    Smoking status: Former Smoker    Smokeless tobacco: Never Used    Alcohol use Yes      Comment: rare    Drug use: No    Sexual activity: Yes     Partners: Female     Other Topics Concern    None     Social History Narrative     Advanced Directives: N      Patient Active Problem List    Diagnosis    Controlled type 2 diabetes mellitus without complication, without long-term current use of insulin ([address]    Hearing loss    Vertigo    Pulsatile tinnitus    Anxiety    GERD (gastroesophageal reflux disease)       Nurse notes were reviewed and are correct  Review of Systems - negative except as listed above in the HPI    Objective:     Vitals:    12/27/17 1338   BP: 133/84   Pulse: 79   Resp: 19   Temp: 98.2 °F (36.8 °C)   TempSrc: Oral   SpO2: 96%   Weight: 259 lb (117.5 kg)   Height: 5' 8\" (1.727 m)     Physical Examination: General appearance - alert, well appearing, and in no distress, oriented to person, place, and time, overweight and well hydrated  Mental status - normal mood, behavior, speech, dress, motor activity, and thought processes  Eyes - pupils equal and reactive, extraocular eye movements intact  Ears - left ear normal, right external canal inflamed. Significant dry/dark cerumen in the right ear canal  Nose - normal and patent, no erythema, discharge or polyps  Mouth - mucous membranes moist, pharynx normal without lesions  Neck - supple, no significant adenopathy  Chest - clear to auscultation, no wheezes, rales or rhonchi, symmetric air entry. Palpable, tender mass vs soft tissue edema over the xiphoid process/lower sternum, minimal erythema. Heart - normal rate, regular rhythm, normal S1, S2, no murmurs, rubs, clicks or gallops, no JVD  Abdomen - soft, nontender, nondistended, no masses or organomegaly  Neurological - alert, oriented, normal speech, no focal findings or movement disorder noted  Skin - normal coloration and turgor    Assessment/ Plan:   Diagnoses and all orders for this visit:    1.  Acute otitis externa of right ear, unspecified type  Add Rx  Use 1/2 str H202 daily for 1 week to soften wax after antibiotic treatment completed. -     neomycin-polymyxin-hydrocortisone, buffered, (PEDIOTIC) 3.5-10,000-1 mg/mL-unit/mL-% otic suspension; Administer 3 Drops in right ear four (4) times daily for 10 days. 2. Sternal mass  US ordered for further evaluation  -     US CHEST; Future       Follow-up Disposition:  Return in about 2 weeks (around 1/10/2018). to f/u on sternum and for diabetes check up    I have discussed the diagnosis with the patient and the intended plan as seen in the above orders. The patient has received an after-visit summary and questions were answered concerning future plans. The patient verbalizes understanding. Medication Side Effects and Warnings were discussed with patient: yes  Patient Labs were reviewed and or requested: no  Patient Past Records were reviewed and or requested: yes    Patient Instructions          Swimmer's Ear: Care Instructions  Your Care Instructions    Swimmer's ear (otitis externa) is inflammation or infection of the ear canal. This is the passage that leads from the outer ear to the eardrum. Any water, sand, or other debris that gets into the ear canal and stays there can cause swimmer's ear. Putting cotton swabs or other items in the ear to clean it can also cause this problem. Swimmer's ear can be very painful. But you can treat the pain and infection with medicines. You should feel better in a few days. Follow-up care is a key part of your treatment and safety. Be sure to make and go to all appointments, and call your doctor if you are having problems. It's also a good idea to know your test results and keep a list of the medicines you take. How can you care for yourself at home? Cleaning and care  · Use antibiotic drops as your doctor directs. · Do not insert ear drops (other than the antibiotic ear drops) or anything else into the ear unless your doctor has told you to. · Avoid getting water in the ear until the problem clears up.  Use cotton lightly coated with petroleum jelly as an earplug. Do not use plastic earplugs. · Use a hair dryer set on low to carefully dry the ear after you shower. · To ease ear pain, hold a warm washcloth against your ear. · Take pain medicines exactly as directed. ¨ If the doctor gave you a prescription medicine for pain, take it as prescribed. ¨ If you are not taking a prescription pain medicine, ask your doctor if you can take an over-the-counter medicine. Inserting ear drops  · Warm the drops to body temperature by rolling the container in your hands. Or you can place it in a cup of warm water for a few minutes. · Lie down, with your ear facing up. · Place drops inside the ear. Follow your doctor's instructions (or the directions on the label) for how many drops to use. Gently wiggle the outer ear or pull the ear up and back to help the drops get into the ear. · It's important to keep the liquid in the ear canal for 3 to 5 minutes. When should you call for help? Call your doctor now or seek immediate medical care if:  ? · You have a new or higher fever. ? · You have new or worse pain, swelling, warmth, or redness around or behind your ear. ? · You have new or increasing pus or blood draining from your ear. ? Watch closely for changes in your health, and be sure to contact your doctor if:  ? · You are not getting better after 2 days (48 hours). Where can you learn more? Go to http://donald-liana.info/. Enter C706 in the search box to learn more about \"Swimmer's Ear: Care Instructions. \"  Current as of: May 12, 2017  Content Version: 11.4  © 1999-8831 [employer]. Care instructions adapted under license by [employer] (which disclaims liability or warranty for this information).  If you have questions about a medical condition or this instruction, always ask your healthcare professional. [address] any warranty or liability for your use of this information.           Michelle Hernandez P

## 2017-12-27 NOTE — PROGRESS NOTES
1. Have you been to the ER, urgent care clinic since your last visit? Hospitalized since your last visit? ER in [facility] for dehydration. 2. Have you seen or consulted any other health care providers outside of the [address] [place] [address] since your last visit? Include any pap smears or colon screening.  No      Chief Complaint   Patient presents with    Ear Pain     right ear

## 2017-12-29 ENCOUNTER — HOSPITAL ENCOUNTER (OUTPATIENT)
Dept: ULTRASOUND IMAGING | Age: 42
Discharge: HOME | End: 2017-12-29
Attending: NURSE PRACTITIONER
Payer: COMMERCIAL

## 2017-12-29 DIAGNOSIS — M89.8X8 STERNAL MASS: ICD-10-CM

## 2017-12-29 PROCEDURE — 76604 US EXAM CHEST: CPT

## 2017-12-31 ENCOUNTER — HOSPITAL ENCOUNTER (EMERGENCY)
Age: 42
Discharge: HOME | End: 2017-12-31
Attending: EMERGENCY MEDICINE
Payer: COMMERCIAL

## 2017-12-31 ENCOUNTER — APPOINTMENT (OUTPATIENT)
Dept: CT IMAGING | Age: 42
End: 2017-12-31
Attending: EMERGENCY MEDICINE
Payer: COMMERCIAL

## 2017-12-31 VITALS
SYSTOLIC BLOOD PRESSURE: 162 MMHG | TEMPERATURE: 97.6 F | HEART RATE: 82 BPM | WEIGHT: 252 LBS | RESPIRATION RATE: 15 BRPM | BODY MASS INDEX: 38.19 KG/M2 | OXYGEN SATURATION: 95 % | HEIGHT: 68 IN | DIASTOLIC BLOOD PRESSURE: 92 MMHG

## 2017-12-31 DIAGNOSIS — R07.89 CHEST WALL PAIN: Primary | ICD-10-CM

## 2017-12-31 LAB
ALBUMIN SERPL-MCNC: 3.8 G/DL (ref 3.5–5)
ALBUMIN/GLOB SERPL: 1 {RATIO} (ref 1.1–2.2)
ALP SERPL-CCNC: 107 U/L (ref 45–117)
ALT SERPL-CCNC: 30 U/L (ref 12–78)
ANION GAP SERPL CALC-SCNC: 10 MMOL/L (ref 5–15)
AST SERPL-CCNC: 14 U/L (ref 15–37)
BASOPHILS # BLD: 0 K/UL (ref 0–0.1)
BASOPHILS NFR BLD: 0 % (ref 0–1)
BILIRUB SERPL-MCNC: 0.4 MG/DL (ref 0.2–1)
BUN SERPL-MCNC: 16 MG/DL (ref 6–20)
BUN/CREAT SERPL: 14 (ref 12–20)
CALCIUM SERPL-MCNC: 8.6 MG/DL (ref 8.5–10.1)
CHLORIDE SERPL-SCNC: 105 MMOL/L (ref 97–108)
CO2 SERPL-SCNC: 26 MMOL/L (ref 21–32)
CREAT SERPL-MCNC: 1.11 MG/DL (ref 0.7–1.3)
EOSINOPHIL # BLD: 0.1 K/UL (ref 0–0.4)
EOSINOPHIL NFR BLD: 1 % (ref 0–7)
ERYTHROCYTE [DISTWIDTH] IN BLOOD BY AUTOMATED COUNT: 12.8 % (ref 11.5–14.5)
GLOBULIN SER CALC-MCNC: 3.9 G/DL (ref 2–4)
GLUCOSE SERPL-MCNC: 117 MG/DL (ref 65–100)
HCT VFR BLD AUTO: 45.7 % (ref 36.6–50.3)
HGB BLD-MCNC: 16.4 G/DL (ref 12.1–17)
LYMPHOCYTES # BLD: 2.1 K/UL (ref 0.8–3.5)
LYMPHOCYTES NFR BLD: 23 % (ref 12–49)
MCH RBC QN AUTO: 30.2 PG (ref 26–34)
MCHC RBC AUTO-ENTMCNC: 35.9 G/DL (ref 30–36.5)
MCV RBC AUTO: 84.2 FL (ref 80–99)
MONOCYTES # BLD: 0.6 K/UL (ref 0–1)
MONOCYTES NFR BLD: 7 % (ref 5–13)
NEUTS SEG # BLD: 6.1 K/UL (ref 1.8–8)
NEUTS SEG NFR BLD: 69 % (ref 32–75)
PLATELET # BLD AUTO: 228 K/UL (ref 150–400)
POTASSIUM SERPL-SCNC: 3.8 MMOL/L (ref 3.5–5.1)
PROT SERPL-MCNC: 7.7 G/DL (ref 6.4–8.2)
RBC # BLD AUTO: 5.43 M/UL (ref 4.1–5.7)
SODIUM SERPL-SCNC: 141 MMOL/L (ref 136–145)
TROPONIN I SERPL-MCNC: <0.04 NG/ML
WBC # BLD AUTO: 8.8 K/UL (ref 4.1–11.1)

## 2017-12-31 PROCEDURE — 71260 CT THORAX DX C+: CPT

## 2017-12-31 PROCEDURE — 74011250636 HC RX REV CODE- 250/636: Performed by: EMERGENCY MEDICINE

## 2017-12-31 PROCEDURE — 84484 ASSAY OF TROPONIN QUANT: CPT | Performed by: EMERGENCY MEDICINE

## 2017-12-31 PROCEDURE — 93005 ELECTROCARDIOGRAM TRACING: CPT

## 2017-12-31 PROCEDURE — 99285 EMERGENCY DEPT VISIT HI MDM: CPT

## 2017-12-31 PROCEDURE — 96374 THER/PROPH/DIAG INJ IV PUSH: CPT

## 2017-12-31 PROCEDURE — 36415 COLL VENOUS BLD VENIPUNCTURE: CPT | Performed by: EMERGENCY MEDICINE

## 2017-12-31 PROCEDURE — 85025 COMPLETE CBC W/AUTO DIFF WBC: CPT | Performed by: EMERGENCY MEDICINE

## 2017-12-31 PROCEDURE — 80053 COMPREHEN METABOLIC PANEL: CPT | Performed by: EMERGENCY MEDICINE

## 2017-12-31 PROCEDURE — 74011636320 HC RX REV CODE- 636/320: Performed by: RADIOLOGY

## 2017-12-31 RX ORDER — KETOROLAC TROMETHAMINE 30 MG/ML
30 INJECTION, SOLUTION INTRAMUSCULAR; INTRAVENOUS
Status: COMPLETED | OUTPATIENT
Start: 2017-12-31 | End: 2017-12-31

## 2017-12-31 RX ORDER — MECLIZINE HYDROCHLORIDE 25 MG/1
25 TABLET ORAL
Status: COMPLETED | OUTPATIENT
Start: 2017-12-31 | End: 2017-12-31

## 2017-12-31 RX ADMIN — KETOROLAC TROMETHAMINE 30 MG: 30 INJECTION, SOLUTION INTRAMUSCULAR at 05:53

## 2017-12-31 RX ADMIN — MECLIZINE HYDROCHLORIDE 25 MG: 25 TABLET ORAL at 05:53

## 2017-12-31 RX ADMIN — IOPAMIDOL 100 ML: 612 INJECTION, SOLUTION INTRAVENOUS at 06:07

## 2017-12-31 NOTE — DISCHARGE INSTRUCTIONS
Musculoskeletal Chest Pain: Care Instructions  Your Care Instructions    Chest pain is not always a sign that something is wrong with your heart or that you have another serious problem. The doctor thinks your chest pain is caused by strained muscles or ligaments, inflamed chest cartilage, or another problem in your chest, rather than by your heart. You may need more tests to find the cause of your chest pain. Follow-up care is a key part of your treatment and safety. Be sure to make and go to all appointments, and call your doctor if you are having problems. It's also a good idea to know your test results and keep a list of the medicines you take. How can you care for yourself at home? · Take pain medicines exactly as directed. ¨ If the doctor gave you a prescription medicine for pain, take it as prescribed. ¨ If you are not taking a prescription pain medicine, ask your doctor if you can take an over-the-counter medicine. · Rest and protect the sore area. · Stop, change, or take a break from any activity that may be causing your pain or soreness. · Put ice or a cold pack on the sore area for 10 to 20 minutes at a time. Try to do this every 1 to 2 hours for the next 3 days (when you are awake) or until the swelling goes down. Put a thin cloth between the ice and your skin. · After 2 or 3 days, apply a heating pad set on low or a warm cloth to the area that hurts. Some doctors suggest that you go back and forth between hot and cold. · Do not wrap or tape your ribs for support. This may cause you to take smaller breaths, which could increase your risk of lung problems. · Mentholated creams such as Bengay or Icy Hot may soothe sore muscles. Follow the instructions on the package. · Follow your doctor's instructions for exercising. · Gentle stretching and massage may help you get better faster. Stretch slowly to the point just before pain begins, and hold the stretch for at least 15 to 30 seconds.  Do this 3 or 4 times a day. Stretch just after you have applied heat. · As your pain gets better, slowly return to your normal activities. Any increased pain may be a sign that you need to rest a while longer. When should you call for help? Call 911 anytime you think you may need emergency care. For example, call if:  ? · You have chest pain or pressure. This may occur with:  ¨ Sweating. ¨ Shortness of breath. ¨ Nausea or vomiting. ¨ Pain that spreads from the chest to the neck, jaw, or one or both shoulders or arms. ¨ Dizziness or lightheadedness. ¨ A fast or uneven pulse. After calling 911, chew 1 adult-strength aspirin. Wait for an ambulance. Do not try to drive yourself. ? · You have sudden chest pain and shortness of breath, or you cough up blood. ?Call your doctor now or seek immediate medical care if:  ? · You have any trouble breathing. ? · Your chest pain gets worse. ? · Your chest pain occurs consistently with exercise and is relieved by rest.   ? Watch closely for changes in your health, and be sure to contact your doctor if:  ? · Your chest pain does not get better after 1 week. Where can you learn more? Go to http://donald-liana.info/. Enter V293 in the search box to learn more about \"Musculoskeletal Chest Pain: Care Instructions. \"  Current as of: March 20, 2017  Content Version: 11.4  © 3803-0646 [employer]. Care instructions adapted under license by [employer] (which disclaims liability or warranty for this information). If you have questions about a medical condition or this instruction, always ask your healthcare professional. [place] [address] any warranty or liability for your use of this information. Chest Pain: Care Instructions  Your Care Instructions    There are many things that can cause chest pain. Some are not serious and will get better on their own in a few days.  But some kinds of chest pain need more testing and treatment. Your doctor may have recommended a follow-up visit in the next 8 to 12 hours. If you are not getting better, you may need more tests or treatment. Even though your doctor has released you, you still need to watch for any problems. The doctor carefully checked you, but sometimes problems can develop later. If you have new symptoms or if your symptoms do not get better, get medical care right away. If you have worse or different chest pain or pressure that lasts more than 5 minutes or you passed out (lost consciousness), call 911 or seek other emergency help right away. A medical visit is only one step in your treatment. Even if you feel better, you still need to do what your doctor recommends, such as going to all suggested follow-up appointments and taking medicines exactly as directed. This will help you recover and help prevent future problems. How can you care for yourself at home? · Rest until you feel better. · Take your medicine exactly as prescribed. Call your doctor if you think you are having a problem with your medicine. · Do not drive after taking a prescription pain medicine. When should you call for help? Call 911 if:  ? · You passed out (lost consciousness). ? · You have severe difficulty breathing. ? · You have symptoms of a heart attack. These may include:  ¨ Chest pain or pressure, or a strange feeling in your chest.  ¨ Sweating. ¨ Shortness of breath. ¨ Nausea or vomiting. ¨ Pain, pressure, or a strange feeling in your back, neck, jaw, or upper belly or in one or both shoulders or arms. ¨ Lightheadedness or sudden weakness. ¨ A fast or irregular heartbeat. After you call 911, the  may tell you to chew 1 adult-strength or 2 to 4 low-dose aspirin. Wait for an ambulance. Do not try to drive yourself. ?Call your doctor today if:  ? · You have any trouble breathing. ? · Your chest pain gets worse.    ? · You are dizzy or lightheaded, or you feel like you may faint. ? · You are not getting better as expected. ? · You are having new or different chest pain. Where can you learn more? Go to http://donald-liana.info/. Enter A120 in the search box to learn more about \"Chest Pain: Care Instructions. \"  Current as of: March 20, 2017  Content Version: 11.4  © 3323-6284 [employer]. Care instructions adapted under license by [employer] (which disclaims liability or warranty for this information). If you have questions about a medical condition or this instruction, always ask your healthcare professional. [place] [address] any warranty or liability for your use of this information.

## 2017-12-31 NOTE — ED PROVIDER NOTES
HPI Comments: Anthony Howe is a 37 yo M with anxiety and shortness of breath. He was seen recently for a tender mass in his lower anterior chest and had an US which was inconclusive and was recommended to have a CT chest with contrast to further evaluate. He was not able to get an appointment to see his doctor before next Tuesday and this morning he woke with his head spinning and dyspnea. He has not taken any medications for his symptoms. He denies fever, sick contacts or trauma       Past Medical History:   Diagnosis Date    Asthma     Bronchitis     Chronic pain     Depression     Diabetes ([facility] [address]     type 2    Gastrointestinal disorder     GERD    Hypertension     Otitis media     Sinus infection     Vertigo 3/4/2013       History reviewed. No pertinent surgical history. Family History:   Problem Relation Age of Onset    Kidney Disease Mother     Hypertension Mother     Diabetes Mother     Cancer Mother      uterine    Elevated Lipids Mother     Coronary Artery Disease Father      premature    Hypertension Father     Diabetes Father     High Cholesterol Father     Cancer Father      skin    Elevated Lipids Father     Heart Disease Father     Cancer Sister      uterine    Hypertension Brother     High Cholesterol Brother     Coronary Artery Disease Paternal Uncle     Coronary Artery Disease Paternal Grandmother     Coronary Artery Disease Paternal Grandfather        Social History     Social History    Marital status:      Spouse name: N/A    Number of children: N/A    Years of education: N/A     Occupational History    Not on file.      Social History Main Topics    Smoking status: Former Smoker    Smokeless tobacco: Never Used      Comment: quit over 10 years    Alcohol use Yes      Comment: rare    Drug use: No    Sexual activity: Yes     Partners: Female     Other Topics Concern    Not on file     Social History Narrative         ALLERGIES: Ativan [lorazepam] and Avelox [moxifloxacin]    Review of Systems   Constitutional: Negative for fever. HENT: Negative for sore throat. Eyes: Negative for visual disturbance. Respiratory: Positive for shortness of breath. Negative for cough. Cardiovascular: Positive for chest pain. Gastrointestinal: Negative for abdominal pain. Genitourinary: Negative for dysuria. Musculoskeletal: Negative for back pain. Skin: Negative for rash. Neurological: Negative for headaches. Vitals:    12/31/17 0455 12/31/17 0457 12/31/17 0500 12/31/17 0530   BP: 132/80  141/77    Pulse:  88 70 78   Resp:  14 25 23   Temp:       SpO2: 98% 99% 98% 95%   Weight:       Height:                Physical Exam   Constitutional: He appears well-developed and well-nourished. No distress. HENT:   Head: Normocephalic and atraumatic. Mouth/Throat: Oropharynx is clear and moist.   Eyes: Conjunctivae and EOM are normal.   Neck: Normal range of motion and phonation normal.   Cardiovascular: Normal rate and intact distal pulses. Pulmonary/Chest: Effort normal. No respiratory distress. He exhibits tenderness (mid inferior chest). Abdominal: He exhibits no distension. There is tenderness in the epigastric area. Musculoskeletal: Normal range of motion. He exhibits no tenderness. Neurological: He is alert. He is not disoriented. He exhibits normal muscle tone. Skin: Skin is warm and dry. Nursing note and vitals reviewed. [facility]  ED Course     6:36 AM  Discussed with Dr. Rabia Sandoval, Radiologist.  Reviewed CT images and discussed recent US with perixyphoid process mass.   Nothing abnormal seen on CT chest with contrast.   Procedures

## 2018-01-02 DIAGNOSIS — M89.8X8 STERNAL MASS: Primary | ICD-10-CM

## 2018-01-02 LAB
ATRIAL RATE: 72 BPM
CALCULATED P AXIS, ECG09: 11 DEGREES
CALCULATED R AXIS, ECG10: 2 DEGREES
CALCULATED T AXIS, ECG11: 6 DEGREES
DIAGNOSIS, 93000: NORMAL
P-R INTERVAL, ECG05: 156 MS
Q-T INTERVAL, ECG07: 384 MS
QRS DURATION, ECG06: 94 MS
QTC CALCULATION (BEZET), ECG08: 420 MS
VENTRICULAR RATE, ECG03: 72 BPM

## 2018-01-02 NOTE — PROGRESS NOTES
Needs CT of chest with contrast for further eval of symptoms.  Order entered, please contact patient with instructions for scheduling the test.

## 2018-01-02 NOTE — PROGRESS NOTES
Disregard earlier message, patient has already had the f/u exam. Recommend f/u appt with Dr. Dot Mock to discuss symptoms further.

## 2018-01-04 ENCOUNTER — TELEPHONE (OUTPATIENT)
Dept: FAMILY MEDICINE CLINIC | Age: 43
End: 2018-01-04

## 2018-01-04 DIAGNOSIS — E78.5 HYPERLIPIDEMIA, UNSPECIFIED HYPERLIPIDEMIA TYPE: ICD-10-CM

## 2018-01-04 DIAGNOSIS — I10 ESSENTIAL HYPERTENSION: ICD-10-CM

## 2018-01-04 DIAGNOSIS — E11.9 CONTROLLED TYPE 2 DIABETES MELLITUS WITHOUT COMPLICATION, WITHOUT LONG-TERM CURRENT USE OF INSULIN (HCC): Primary | ICD-10-CM

## 2018-01-04 DIAGNOSIS — E66.01 OBESITY, MORBID, BMI 40.0-49.9 (HCC): ICD-10-CM

## 2018-01-04 NOTE — TELEPHONE ENCOUNTER
Patient came in for his scheduled 8am apt today and due to the inclement weather staff is not yet able to arrive to work. Patient states this is his last week here until April and  wanted to do a check up on him with labs? Can the labs be ordered and drawn when someone arrives?     Please advise thanks

## 2018-01-05 ENCOUNTER — OFFICE VISIT (OUTPATIENT)
Dept: FAMILY MEDICINE CLINIC | Age: 43
End: 2018-01-05

## 2018-01-05 VITALS
TEMPERATURE: 98.3 F | HEART RATE: 74 BPM | RESPIRATION RATE: 18 BRPM | OXYGEN SATURATION: 96 % | HEIGHT: 68 IN | BODY MASS INDEX: 39.4 KG/M2 | DIASTOLIC BLOOD PRESSURE: 86 MMHG | SYSTOLIC BLOOD PRESSURE: 130 MMHG | WEIGHT: 260 LBS

## 2018-01-05 DIAGNOSIS — E11.9 CONTROLLED TYPE 2 DIABETES MELLITUS WITHOUT COMPLICATION, WITHOUT LONG-TERM CURRENT USE OF INSULIN (HCC): ICD-10-CM

## 2018-01-05 DIAGNOSIS — F41.9 ANXIETY: Primary | ICD-10-CM

## 2018-01-05 DIAGNOSIS — E66.01 OBESITY, MORBID, BMI 40.0-49.9 (HCC): ICD-10-CM

## 2018-01-05 PROBLEM — F33.9 RECURRENT DEPRESSION (HCC): Status: ACTIVE | Noted: 2018-01-05

## 2018-01-05 LAB
CHOLEST SERPL-MCNC: 189 MG/DL (ref 100–199)
EST. AVERAGE GLUCOSE BLD GHB EST-MCNC: 100 MG/DL
HBA1C MFR BLD: 5.1 % (ref 4.8–5.6)
HDLC SERPL-MCNC: 45 MG/DL
INTERPRETATION, 910389: NORMAL
LDLC SERPL CALC-MCNC: 119 MG/DL (ref 0–99)
Lab: NORMAL
TRIGL SERPL-MCNC: 123 MG/DL (ref 0–149)
VLDLC SERPL CALC-MCNC: 25 MG/DL (ref 5–40)

## 2018-01-05 RX ORDER — BUSPIRONE HYDROCHLORIDE 5 MG/1
5 TABLET ORAL 2 TIMES DAILY
Qty: 60 TAB | Refills: 3 | Status: SHIPPED | OUTPATIENT
Start: 2018-01-05 | End: 2018-01-05 | Stop reason: SDUPTHER

## 2018-01-05 RX ORDER — TOPIRAMATE 25 MG/1
TABLET ORAL
Qty: 90 TAB | Refills: 3 | Status: SHIPPED | OUTPATIENT
Start: 2018-01-05 | End: 2019-07-08

## 2018-01-05 RX ORDER — BUSPIRONE HYDROCHLORIDE 5 MG/1
5 TABLET ORAL 2 TIMES DAILY
Qty: 180 TAB | Refills: 3 | Status: SHIPPED | OUTPATIENT
Start: 2018-01-05 | End: 2019-03-25

## 2018-01-05 RX ORDER — CITALOPRAM 10 MG/1
10 TABLET ORAL DAILY
Qty: 30 TAB | Refills: 5 | Status: SHIPPED | OUTPATIENT
Start: 2018-01-05 | End: 2018-02-16 | Stop reason: SDUPTHER

## 2018-01-05 NOTE — PROGRESS NOTES
Chief Complaint   Patient presents with    Diabetes     he is a 38y.o. year old male who presents for evalution. He is here to follow up on the diabetes  He is also going through a divorce and the anxiety attacks have started happening  He says he was down in the 240s and he regained a lot during the holiday  Right now he is wearing a few layers of clothing  He has been checking the fingersticks and nothing was above 120  He also has been checking the BP and that has been normal    Reviewed PmHx, RxHx, FmHx, SocHx, AllgHx and updated and dated in the chart. Aspirin yes ____   No____ N/A____    Patient Active Problem List    Diagnosis    Recurrent depression ([facility] [address]    Controlled type 2 diabetes mellitus without complication, without long-term current use of insulin ([facility] [address]    Hearing loss    Vertigo    Pulsatile tinnitus    Anxiety    GERD (gastroesophageal reflux disease)       Nurse notes were reviewed and copied and are correct  Review of Systems - negative except as listed above in the HPI    Objective:     Vitals:    01/05/18 0843   BP: 130/86   Pulse: 74   Resp: 18   Temp: 98.3 °F (36.8 °C)   TempSrc: Oral   SpO2: 96%   Weight: 260 lb (117.9 kg)   Height: 5' 8\" (1.727 m)     Physical Examination: General appearance - alert, well appearing, and in no distress and oriented to person, place, and time  Mental status - alert, oriented to person, place, and time  Lymphatics - no palpable lymphadenopathy, no hepatosplenomegaly  Chest - clear to auscultation, no wheezes, rales or rhonchi, symmetric air entry  Heart - normal rate, regular rhythm, normal S1, S2, no murmurs, rubs, clicks or gallops  Abdomen - soft, nontender, nondistended, no masses or organomegaly  Musculoskeletal - no joint tenderness, deformity or swelling  Extremities - peripheral pulses normal, no pedal edema, no clubbing or cyanosis         Assessment/ Plan:   Diagnoses and all orders for this visit:    1.  Anxiety  -     citalopram (CELEXA) 10 mg tablet; Take 1 Tab by mouth daily. -     busPIRone (BUSPAR) 5 mg tablet; Take 1 Tab by mouth two (2) times a day. 2. Controlled type 2 diabetes mellitus without complication, without long-term current use of insulin ([facility])    3. Obesity, morbid, BMI 40.0-49.9 ([facility])  -     topiramate (TOPAMAX) 25 mg tablet; TAKE 1 TABLET BY MOUTH EVERY DAY WITH DINNER       Follow-up Disposition:  Return in about 3 months (around 4/5/2018). ICD-10-CM ICD-9-CM    1. Anxiety F41.9 300.00 citalopram (CELEXA) 10 mg tablet      busPIRone (BUSPAR) 5 mg tablet      DISCONTINUED: busPIRone (BUSPAR) 5 mg tablet   2. Controlled type 2 diabetes mellitus without complication, without long-term current use of insulin ([facility]) E11.9 250.00    3. Obesity, morbid, BMI 40.0-49.9 ([facility]) E66.01 278.01 topiramate (TOPAMAX) 25 mg tablet       I have discussed the diagnosis with the patient and the intended plan as seen in the above orders. The patient has received an after-visit summary and questions were answered concerning future plans. Medication Side Effects and Warnings were discussed with patient: yes  Patient Labs were reviewed and or requested: yes  Patient Past Records were reviewed and or requested: yes        Patient Instructions        Learning About Meal Planning for Diabetes  Why plan your meals? Meal planning can be a key part of managing diabetes. Planning meals and snacks with the right balance of carbohydrate, protein, and fat can help you keep your blood sugar at the target level you set with your doctor. You don't have to eat special foods. You can eat what your family eats, including sweets once in a while. But you do have to pay attention to how often you eat and how much you eat of certain foods. You may want to work with a dietitian or a certified diabetes educator. He or she can give you tips and meal ideas and can answer your questions about meal planning.  This health professional can also help you reach a healthy weight if that is one of your goals. What plan is right for you? Your dietitian or diabetes educator may suggest that you start with the plate format or carbohydrate counting. The plate format  The plate format is a simple way to help you manage how you eat. You plan meals by learning how much space each food should take on a plate. Using the plate format helps you spread carbohydrate throughout the day. It can make it easier to keep your blood sugar level within your target range. It also helps you see if you're eating healthy portion sizes. To use the plate format, you put non-starchy vegetables on half your plate. Add meat or meat substitutes on one-quarter of the plate. Put a grain or starchy vegetable (such as brown rice or a potato) on the final quarter of the plate. You can add a small piece of fruit and some low-fat or fat-free milk or yogurt, depending on your carbohydrate goal for each meal.  Here are some tips for using the plate format:  · Make sure that you are not using an oversized plate. A 9-inch plate is best. Many restaurants use larger plates. · Get used to using the plate format at home. Then you can use it when you eat out. · Write down your questions about using the plate format. Talk to your doctor, a dietitian, or a diabetes educator about your concerns. Carbohydrate counting  With carbohydrate counting, you plan meals based on the amount of carbohydrate in each food. Carbohydrate raises blood sugar higher and more quickly than any other nutrient. It is found in desserts, breads and cereals, and fruit. It's also found in starchy vegetables such as potatoes and corn, grains such as rice and pasta, and milk and yogurt. Spreading carbohydrate throughout the day helps keep your blood sugar levels within your target range.   Your daily amount depends on several things, including your weight, how active you are, which diabetes medicines you take, and what your goals are for your blood sugar levels. A registered dietitian or diabetes educator can help you plan how much carbohydrate to include in each meal and snack. A guideline for your daily amount of carbohydrate is:  · 45 to 60 grams at each meal. That's about the same as 3 to 4 carbohydrate servings. · 15 to 20 grams at each snack. That's about the same as 1 carbohydrate serving. The Nutrition Facts label on packaged foods tells you how much carbohydrate is in a serving of the food. First, look at the serving size on the food label. Is that the amount you eat in a serving? All of the nutrition information on a food label is based on that serving size. So if you eat more or less than that, you'll need to adjust the other numbers. Total carbohydrate is the next thing you need to look for on the label. If you count carbohydrate servings, one serving of carbohydrate is 15 grams. For foods that don't come with labels, such as fresh fruits and vegetables, you'll need a guide that lists carbohydrate in these foods. Ask your doctor, dietitian, or diabetes educator about books or other nutrition guides you can use. If you take insulin, you need to know how many grams of carbohydrate are in a meal. This lets you know how much rapid-acting insulin to take before you eat. If you use an insulin pump, you get a constant rate of insulin during the day. So the pump must be programmed at meals to give you extra insulin to cover the rise in blood sugar after meals. When you know how much carbohydrate you will eat, you can take the right amount of insulin. Or, if you always use the same amount of insulin, you need to make sure that you eat the same amount of carbohydrate at meals. If you need more help to understand carbohydrate counting and food labels, ask your doctor, dietitian, or diabetes educator. How do you get started with meal planning? Here are some tips to get started:  · Plan your meals a week at a time.  Don't forget to include snacks too.  · Use cookbooks or online recipes to plan several main meals. Plan some quick meals for busy nights. You also can double some recipes that freeze well. Then you can save half for other busy nights when you don't have time to cook. · Make sure you have the ingredients you need for your recipes. If you're running low on basic items, put these items on your shopping list too. · List foods that you use to make breakfasts, lunches, and snacks. List plenty of fruits and vegetables. · Post this list on the refrigerator. Add to it as you think of more things you need. · Take the list to the store to do your weekly shopping. Follow-up care is a key part of your treatment and safety. Be sure to make and go to all appointments, and call your doctor if you are having problems. It's also a good idea to know your test results and keep a list of the medicines you take. Where can you learn more? Go to http://donald-liana.info/. Chanda Schlatter in the search box to learn more about \"Learning About Meal Planning for Diabetes. \"  Current as of: March 13, 2017  Content Version: 11.4  © 6817-9238 [employer]. Care instructions adapted under license by [employer] (which disclaims liability or warranty for this information). If you have questions about a medical condition or this instruction, always ask your healthcare professional. [place] [address] any warranty or liability for your use of this information.         The patient verbalizes understanding and agrees with the plan of care        Patient has the advanced directives booklet to review

## 2018-01-05 NOTE — PATIENT INSTRUCTIONS

## 2018-01-05 NOTE — PROGRESS NOTES
1. Have you been to the ER, urgent care clinic since your last visit? Hospitalized since your last visit? No    2. Have you seen or consulted any other health care providers outside of the [address] [place] [address] since your last visit? Include any pap smears or colon screening.  No     Chief Complaint   Patient presents with    Diabetes

## 2018-01-05 NOTE — MR AVS SNAPSHOT
Visit Information Date & Time Provider Department Dept. Phone Encounter #  
 1/5/2018  9:00 AM Carmela Morrison MD [address] [place] [address] [id number] Upcoming Health Maintenance Date Due  
 EYE EXAM RETINAL OR DILATED Q1 10/15/1987 Pneumococcal 19-64 Medium Risk (1 of 1 - PPSV23) 10/15/1996 HEMOGLOBIN A1C Q6M 2/28/2018 LIPID PANEL Q1 8/28/2018 FOOT EXAM Q1 1/5/2019 MICROALBUMIN Q1 1/5/2019 DTaP/Tdap/Td series (2 - Td) 3/26/2020 Allergies as of 1/5/2018  Review Complete On: 1/5/2018 By: Carmela Morrison MD  
  
 Severity Noted Reaction Type Reactions Ativan [Lorazepam]  03/04/2013    Palpitations States is not allergic Avelox [Moxifloxacin]  03/04/2013    Palpitations States is not allergic Current Immunizations  Reviewed on 7/24/2014 Name Date Influenza Vaccine Split 12/1/2010 TD Vaccine 3/26/1999 TDAP Vaccine 3/26/2010 Not reviewed this visit You Were Diagnosed With   
  
 Codes Comments Anxiety    -  Primary ICD-10-CM: F41.9 ICD-9-CM: 300.00 Controlled type 2 diabetes mellitus without complication, without long-term current use of insulin ([facility]     ICD-10-CM: E11.9 ICD-9-CM: 250.00 Obesity, morbid, BMI 40.0-49.9 (HCC)     ICD-10-CM: E66.01 
ICD-9-CM: 278.01 Vitals BP Pulse Temp Resp Height(growth percentile) Weight(growth percentile) 130/86 74 98.3 °F (36.8 °C) (Oral) 18 5' 8\" (1.727 m) 260 lb (117.9 kg) SpO2 BMI Smoking Status 96% 39.53 kg/m2 Former Smoker Vitals History BMI and BSA Data Body Mass Index Body Surface Area  
 39.53 kg/m 2 2.38 m 2 Preferred Pharmacy Pharmacy Name Phone [id number], [address] [phone] Your Updated Medication List  
  
   
This list is accurate as of: 1/5/18  9:26 AM.  Always use your most recent med list.  
  
  
  
  
 aspirin 81 mg tablet Take 81 mg by mouth. Blood-Glucose Meter monitoring kit E11.65  
  
 busPIRone 5 mg tablet Commonly known as:  BUSPAR Take 1 Tab by mouth two (2) times a day. citalopram 10 mg tablet Commonly known as:  Rhona [place] Take 1 Tab by mouth daily. fluticasone 50 mcg/actuation nasal spray Commonly known as:  Holden Sebastian 2 Sprays by Both Nostrils route daily. glucose blood VI test strips strip Commonly known as:  blood glucose test  
E11.65 Check blood sugar daily Lancets Misc E11.65 Check blood sugar once daily  
  
 metFORMIN 500 mg tablet Commonly known as:  GLUCOPHAGE  
TAKE 1 TABLET BY MOUTH TWICE PER DAY WITH MEALS  
  
 topiramate 25 mg tablet Commonly known as:  TOPAMAX TAKE 1 TABLET BY MOUTH EVERY DAY WITH DINNER Prescriptions Sent to Pharmacy Refills  
 citalopram (CELEXA) 10 mg tablet 5 Sig: Take 1 Tab by mouth daily. Class: Normal  
 Pharmacy: [id number] - [place], [address] [facility] Ph #: [phone] Route: Oral  
 busPIRone (BUSPAR) 5 mg tablet 3 Sig: Take 1 Tab by mouth two (2) times a day. Class: Normal  
 Pharmacy: [facility]/pharmacy #[id number] - [place], [address] [facility] Ph #: [phone] Route: Oral  
 topiramate (TOPAMAX) 25 mg tablet 3 Sig: TAKE 1 TABLET BY MOUTH EVERY DAY WITH DINNER Class: Normal  
 Pharmacy: [facility]/pharmacy #[id number] - [place], [address] [facility] Ph #: [phone] Patient Instructions Learning About Meal Planning for Diabetes Why plan your meals? Meal planning can be a key part of managing diabetes. Planning meals and snacks with the right balance of carbohydrate, protein, and fat can help you keep your blood sugar at the target level you set with your doctor. You don't have to eat special foods. You can eat what your family eats, including sweets once in a while. But you do have to pay attention to how often you eat and how much you eat of certain foods. You may want to work with a dietitian or a certified diabetes educator. He or she can give you tips and meal ideas and can answer your questions about meal planning. This health professional can also help you reach a healthy weight if that is one of your goals. What plan is right for you? Your dietitian or diabetes educator may suggest that you start with the plate format or carbohydrate counting. The plate format The plate format is a simple way to help you manage how you eat. You plan meals by learning how much space each food should take on a plate. Using the plate format helps you spread carbohydrate throughout the day. It can make it easier to keep your blood sugar level within your target range. It also helps you see if you're eating healthy portion sizes. To use the plate format, you put non-starchy vegetables on half your plate. Add meat or meat substitutes on one-quarter of the plate. Put a grain or starchy vegetable (such as brown rice or a potato) on the final quarter of the plate. You can add a small piece of fruit and some low-fat or fat-free milk or yogurt, depending on your carbohydrate goal for each meal. 
Here are some tips for using the plate format: · Make sure that you are not using an oversized plate. A 9-inch plate is best. Many restaurants use larger plates. · Get used to using the plate format at home. Then you can use it when you eat out. · Write down your questions about using the plate format. Talk to your doctor, a dietitian, or a diabetes educator about your concerns. Carbohydrate counting With carbohydrate counting, you plan meals based on the amount of carbohydrate in each food. Carbohydrate raises blood sugar higher and more quickly than any other nutrient. It is found in desserts, breads and cereals, and fruit. It's also found in starchy vegetables such as potatoes and corn, grains such as rice and pasta, and milk and yogurt.  Spreading carbohydrate throughout the day helps keep your blood sugar levels within your target range. Your daily amount depends on several things, including your weight, how active you are, which diabetes medicines you take, and what your goals are for your blood sugar levels. A registered dietitian or diabetes educator can help you plan how much carbohydrate to include in each meal and snack. A guideline for your daily amount of carbohydrate is: · 45 to 60 grams at each meal. That's about the same as 3 to 4 carbohydrate servings. · 15 to 20 grams at each snack. That's about the same as 1 carbohydrate serving. The Nutrition Facts label on packaged foods tells you how much carbohydrate is in a serving of the food. First, look at the serving size on the food label. Is that the amount you eat in a serving? All of the nutrition information on a food label is based on that serving size. So if you eat more or less than that, you'll need to adjust the other numbers. Total carbohydrate is the next thing you need to look for on the label. If you count carbohydrate servings, one serving of carbohydrate is 15 grams. For foods that don't come with labels, such as fresh fruits and vegetables, you'll need a guide that lists carbohydrate in these foods. Ask your doctor, dietitian, or diabetes educator about books or other nutrition guides you can use. If you take insulin, you need to know how many grams of carbohydrate are in a meal. This lets you know how much rapid-acting insulin to take before you eat. If you use an insulin pump, you get a constant rate of insulin during the day. So the pump must be programmed at meals to give you extra insulin to cover the rise in blood sugar after meals. When you know how much carbohydrate you will eat, you can take the right amount of insulin. Or, if you always use the same amount of insulin, you need to make sure that you eat the same amount of carbohydrate at meals. If you need more help to understand carbohydrate counting and food labels, ask your doctor, dietitian, or diabetes educator. How do you get started with meal planning? Here are some tips to get started: 
· Plan your meals a week at a time. Don't forget to include snacks too. · Use cookbooks or online recipes to plan several main meals. Plan some quick meals for busy nights. You also can double some recipes that freeze well. Then you can save half for other busy nights when you don't have time to cook. · Make sure you have the ingredients you need for your recipes. If you're running low on basic items, put these items on your shopping list too. · List foods that you use to make breakfasts, lunches, and snacks. List plenty of fruits and vegetables. · Post this list on the refrigerator. Add to it as you think of more things you need. · Take the list to the store to do your weekly shopping. Follow-up care is a key part of your treatment and safety. Be sure to make and go to all appointments, and call your doctor if you are having problems. It's also a good idea to know your test results and keep a list of the medicines you take. Where can you learn more? Go to http://donald-liana.info/. Prosper Jose in the search box to learn more about \"Learning About Meal Planning for Diabetes. \" Current as of: March 13, 2017 Content Version: 11.4 © 6266-2298 [employer]. Care instructions adapted under license by [employer] (which disclaims liability or warranty for this information). If you have questions about a medical condition or this instruction, always ask your healthcare professional. [address] any warranty or liability for your use of this information. Introducing [facility]! Dear Porfirio Wright: 
Thank you for requesting a Yedda account. Our records indicate that you already have an active Yedda account.   You can access your account anytime at [email or web address] Did you know that you can access your hospital and ER discharge instructions at any time in Funifi? You can also review all of your test results from your hospital stay or ER visit. Additional Information If you have questions, please visit the Frequently Asked Questions section of the [employer] website at [email or web address] Remember, Funifi is NOT to be used for urgent needs. For medical emergencies, dial 911. Now available from your iPhone and Android! Please provide this summary of care documentation to your next provider. Your primary care clinician is listed as Elma Hidalgo. If you have any questions after today's visit, please call [phone].

## 2018-01-11 NOTE — PROGRESS NOTES
The blood sugar control is great  The cholesterol is not doing as well  I think more exercise is the key here.  I would like to see you exercise 4 days a week for at least 30 minutes-mostly cardio  I want to repeat this when you come back in town

## 2018-01-12 NOTE — PROGRESS NOTES
Spoke with pt and advised of lab results/MD recommendations. Pt verbalized understanding and no further questions.

## 2018-02-16 DIAGNOSIS — F41.9 ANXIETY: ICD-10-CM

## 2018-02-19 RX ORDER — CITALOPRAM 10 MG/1
TABLET ORAL
Qty: 30 TAB | Refills: 1 | Status: SHIPPED | OUTPATIENT
Start: 2018-02-19 | End: 2019-03-25

## 2018-04-15 RX ORDER — AMLODIPINE BESYLATE 5 MG/1
TABLET ORAL
Qty: 30 TAB | Refills: 4 | Status: SHIPPED | OUTPATIENT
Start: 2018-04-15 | End: 2019-12-04

## 2018-12-26 ENCOUNTER — HOSPITAL ENCOUNTER (EMERGENCY)
Age: 43
Discharge: HOME | End: 2018-12-26
Attending: EMERGENCY MEDICINE
Payer: SELF-PAY

## 2018-12-26 ENCOUNTER — APPOINTMENT (OUTPATIENT)
Dept: GENERAL RADIOLOGY | Age: 43
End: 2018-12-26
Attending: EMERGENCY MEDICINE
Payer: SELF-PAY

## 2018-12-26 VITALS
HEIGHT: 68 IN | OXYGEN SATURATION: 96 % | HEART RATE: 72 BPM | TEMPERATURE: 98.4 F | BODY MASS INDEX: 39.25 KG/M2 | DIASTOLIC BLOOD PRESSURE: 75 MMHG | WEIGHT: 259 LBS | SYSTOLIC BLOOD PRESSURE: 129 MMHG | RESPIRATION RATE: 20 BRPM

## 2018-12-26 DIAGNOSIS — R51.9 NONINTRACTABLE HEADACHE, UNSPECIFIED CHRONICITY PATTERN, UNSPECIFIED HEADACHE TYPE: Primary | ICD-10-CM

## 2018-12-26 DIAGNOSIS — R05.9 COUGH: ICD-10-CM

## 2018-12-26 DIAGNOSIS — J06.9 UPPER RESPIRATORY TRACT INFECTION, UNSPECIFIED TYPE: ICD-10-CM

## 2018-12-26 LAB
ANION GAP SERPL CALC-SCNC: 13 MMOL/L (ref 5–15)
BASOPHILS # BLD: 0 K/UL (ref 0–0.1)
BASOPHILS NFR BLD: 1 % (ref 0–1)
BUN SERPL-MCNC: 16 MG/DL (ref 6–20)
BUN/CREAT SERPL: 17 (ref 12–20)
CALCIUM SERPL-MCNC: 8.5 MG/DL (ref 8.5–10.1)
CHLORIDE SERPL-SCNC: 106 MMOL/L (ref 97–108)
CK SERPL-CCNC: 68 U/L (ref 39–308)
CO2 SERPL-SCNC: 22 MMOL/L (ref 21–32)
CREAT SERPL-MCNC: 0.93 MG/DL (ref 0.7–1.3)
DIFFERENTIAL METHOD BLD: NORMAL
EOSINOPHIL # BLD: 0.1 K/UL (ref 0–0.4)
EOSINOPHIL NFR BLD: 1 % (ref 0–7)
ERYTHROCYTE [DISTWIDTH] IN BLOOD BY AUTOMATED COUNT: 12 % (ref 11.5–14.5)
GLUCOSE SERPL-MCNC: 101 MG/DL (ref 65–100)
HCT VFR BLD AUTO: 48.5 % (ref 36.6–50.3)
HGB BLD-MCNC: 16.6 G/DL (ref 12.1–17)
IMM GRANULOCYTES # BLD: 0 K/UL (ref 0–0.04)
IMM GRANULOCYTES NFR BLD AUTO: 0 % (ref 0–0.5)
LYMPHOCYTES # BLD: 1.5 K/UL (ref 0.8–3.5)
LYMPHOCYTES NFR BLD: 22 % (ref 12–49)
MCH RBC QN AUTO: 29.6 PG (ref 26–34)
MCHC RBC AUTO-ENTMCNC: 34.2 G/DL (ref 30–36.5)
MCV RBC AUTO: 86.6 FL (ref 80–99)
MONOCYTES # BLD: 0.5 K/UL (ref 0–1)
MONOCYTES NFR BLD: 7 % (ref 5–13)
NEUTS SEG # BLD: 4.8 K/UL (ref 1.8–8)
NEUTS SEG NFR BLD: 70 % (ref 32–75)
NRBC # BLD: 0 K/UL (ref 0–0.01)
NRBC BLD-RTO: 0 PER 100 WBC
PLATELET # BLD AUTO: 188 K/UL (ref 150–400)
PMV BLD AUTO: 9.6 FL (ref 8.9–12.9)
POTASSIUM SERPL-SCNC: 3.8 MMOL/L (ref 3.5–5.1)
RBC # BLD AUTO: 5.6 M/UL (ref 4.1–5.7)
SODIUM SERPL-SCNC: 141 MMOL/L (ref 136–145)
TROPONIN I SERPL-MCNC: <0.05 NG/ML
WBC # BLD AUTO: 6.9 K/UL (ref 4.1–11.1)

## 2018-12-26 PROCEDURE — 85025 COMPLETE CBC W/AUTO DIFF WBC: CPT

## 2018-12-26 PROCEDURE — 96374 THER/PROPH/DIAG INJ IV PUSH: CPT

## 2018-12-26 PROCEDURE — 99283 EMERGENCY DEPT VISIT LOW MDM: CPT

## 2018-12-26 PROCEDURE — 80048 BASIC METABOLIC PNL TOTAL CA: CPT

## 2018-12-26 PROCEDURE — 36415 COLL VENOUS BLD VENIPUNCTURE: CPT

## 2018-12-26 PROCEDURE — 71046 X-RAY EXAM CHEST 2 VIEWS: CPT

## 2018-12-26 PROCEDURE — 84484 ASSAY OF TROPONIN QUANT: CPT

## 2018-12-26 PROCEDURE — 82550 ASSAY OF CK (CPK): CPT

## 2018-12-26 PROCEDURE — 93005 ELECTROCARDIOGRAM TRACING: CPT

## 2018-12-26 PROCEDURE — 74011250636 HC RX REV CODE- 250/636: Performed by: EMERGENCY MEDICINE

## 2018-12-26 RX ORDER — KETOROLAC TROMETHAMINE 30 MG/ML
30 INJECTION, SOLUTION INTRAMUSCULAR; INTRAVENOUS
Status: COMPLETED | OUTPATIENT
Start: 2018-12-26 | End: 2018-12-26

## 2018-12-26 RX ORDER — IBUPROFEN 600 MG/1
600 TABLET ORAL
Qty: 20 TAB | Refills: 0 | Status: SHIPPED | OUTPATIENT
Start: 2018-12-26 | End: 2019-03-25

## 2018-12-26 RX ADMIN — KETOROLAC TROMETHAMINE 30 MG: 30 INJECTION, SOLUTION INTRAMUSCULAR at 16:04

## 2018-12-26 NOTE — LETTER
NOTIFICATION RETURN TO WORK / SCHOOL 
 
12/26/2018 4:38 PM 
 
Mr. Sharona Tate [address] [place] [address] To Whom It May Concern: 
 
Sharona Tate is currently under the care of [facility] EMERGENCY DEPT. He will return to work/school on: 12/29/18 If there are questions or concerns please have the patient contact our office. Sincerely, Nestor Madrigal NP

## 2018-12-26 NOTE — ED PROVIDER NOTES
1:22 PM  I have evaluated the patient as the Provider in Triage. I have reviewed His vital signs and the triage nurse assessment. I have talked with the patient and any available family and advised that I am the provider in triage and have ordered the appropriate study to initiate their work up based on the clinical presentation during my assessment. I have advised that the patient will be accommodated in the Main ED as soon as possible. I have also requested to contact the triage nurse or myself immediately if the patient experiences any changes in their condition during this brief waiting period. Yelitza Araiza MD           Pt states that he has had a cough, body aches and headache to 2 days. Denies neck pain, visual changes, focal weakness or rash. Denies any difficulty breathing, difficulty swallowing, SOB or abdominal pain. Denies any nausea, vomiting or diarrhea. Pt. Reports taking multi-symptom over the counter cold remedies without relief. Past Medical History:   Diagnosis Date    Asthma     Bronchitis     Chronic pain     Depression     Diabetes (HCC)     type 2    Gastrointestinal disorder     GERD    Hypertension     Otitis media     Sinus infection     Vertigo 3/4/2013       No past surgical history on file.       Family History:   Problem Relation Age of Onset    Kidney Disease Mother     Hypertension Mother     Diabetes Mother     Cancer Mother         uterine    Elevated Lipids Mother     Coronary Artery Disease Father         premature    Hypertension Father     Diabetes Father     High Cholesterol Father     Cancer Father         skin    Elevated Lipids Father     Heart Disease Father     Cancer Sister         uterine    Hypertension Brother     High Cholesterol Brother     Coronary Artery Disease Paternal Uncle     Coronary Artery Disease Paternal Grandmother     Coronary Artery Disease Paternal Grandfather        Social History     Socioeconomic History    Marital status:      Spouse name: Not on file    Number of children: Not on file    Years of education: Not on file    Highest education level: Not on file   Social Needs    Financial resource strain: Not on file    Food insecurity - worry: Not on file    Food insecurity - inability: Not on file    Transportation needs - medical: Not on file   [employer] needs - non-medical: Not on file   Occupational History    Not on file   Tobacco Use    Smoking status: Former Smoker    Smokeless tobacco: Never Used    Tobacco comment: quit over 10 years   Substance and Sexual Activity    Alcohol use: Yes     Comment: rare    Drug use: No    Sexual activity: Yes     Partners: Female   Other Topics Concern    Not on file   Social History Narrative    Not on file         ALLERGIES: Ativan [lorazepam] and Avelox [moxifloxacin]    Review of Systems   Constitutional: Positive for fever. Negative for activity change and appetite change. HENT: Positive for congestion and sore throat. Negative for trouble swallowing. Respiratory: Positive for cough. Negative for shortness of breath. Cardiovascular: Positive for chest pain. Negative for leg swelling. Gastrointestinal: Negative for abdominal pain. Skin: Positive for rash. All other systems reviewed and are negative. Vitals:    12/26/18 1304   BP: (!) 169/96   Pulse: 73   Resp: 18   Temp: 98.2 °F (36.8 °C)   SpO2: 94%   Weight: 117.5 kg (259 lb)   Height: 5' 8\" (1.727 m)            Physical Exam   Constitutional: He is oriented to person, place, and time. Obese white male; non smoker   HENT:   Head: Normocephalic. Neck: Normal range of motion. Neck supple. Cardiovascular: Normal rate and regular rhythm. Pulmonary/Chest: Effort normal and breath sounds normal.   Abdominal: Soft. Bowel sounds are normal. There is no tenderness. Musculoskeletal: Normal range of motion.         Right lower leg: Normal.        Left lower leg: Normal. Lymphadenopathy:     He has no cervical adenopathy. Neurological: He is alert and oriented to person, place, and time. Psychiatric: He has a normal mood and affect. His behavior is normal.   Nursing note and vitals reviewed. MDM       Procedures    EKG reveals NSR with ventricular rate of 68; without ectopy. Reviewed by Dr. Ham Zapien and me. Augustin Larson NP    Pt has  Been re-examined and reports some relief from the medications given. Suspect viral illness. Encouraged pt to treat with rest, fluids, motrin and tylenol. Patient's results and plan of care have been reviewed with him. Patient and/or family have verbally conveyed their understanding and agreement of the patient's signs, symptoms, diagnosis, treatment and prognosis and additionally agree to follow up as recommended or return to the Emergency Room should his condition change prior to follow-up. Discharge instructions have also been provided to the patient with some educational information regarding his diagnosis as well a list of reasons why he would want to return to the ER prior to his follow-up appointment should his condition change. Augustin Larson NP

## 2018-12-26 NOTE — DISCHARGE INSTRUCTIONS
Cough: Care Instructions  Your Care Instructions    A cough is your body's response to something that bothers your throat or airways. Many things can cause a cough. You might cough because of a cold or the flu, bronchitis, or asthma. Smoking, postnasal drip, allergies, and stomach acid that backs up into your throat also can cause coughs. A cough is a symptom, not a disease. Most coughs stop when the cause, such as a cold, goes away. You can take a few steps at home to cough less and feel better. Follow-up care is a key part of your treatment and safety. Be sure to make and go to all appointments, and call your doctor if you are having problems. It's also a good idea to know your test results and keep a list of the medicines you take. How can you care for yourself at home? · Drink lots of water and other fluids. This helps thin the mucus and soothes a dry or sore throat. Honey or lemon juice in hot water or tea may ease a dry cough. · Take cough medicine as directed by your doctor. · Prop up your head on pillows to help you breathe and ease a dry cough. · Try cough drops to soothe a dry or sore throat. Cough drops don't stop a cough. Medicine-flavored cough drops are no better than candy-flavored drops or hard candy. · Do not smoke. Avoid secondhand smoke. If you need help quitting, talk to your doctor about stop-smoking programs and medicines. These can increase your chances of quitting for good. When should you call for help? Call 911 anytime you think you may need emergency care.  For example, call if:    · You have severe trouble breathing.    Call your doctor now or seek immediate medical care if:    · You cough up blood.     · You have new or worse trouble breathing.     · You have a new or higher fever.     · You have a new rash.    Watch closely for changes in your health, and be sure to contact your doctor if:    · You cough more deeply or more often, especially if you notice more mucus or a change in the color of your mucus.     · You have new symptoms, such as a sore throat, an earache, or sinus pain.     · You do not get better as expected. Where can you learn more? Go to http://donald-liana.info/. Enter D279 in the search box to learn more about \"Cough: Care Instructions. \"  Current as of: December 6, 2017  Content Version: 11.8  © 4700-5215 [employer]. Care instructions adapted under license by [employer] (which disclaims liability or warranty for this information). If you have questions about a medical condition or this instruction, always ask your healthcare professional. [address] any warranty or liability for your use of this information. Headache: Care Instructions  Your Care Instructions    Headaches have many possible causes. Most headaches aren't a sign of a more serious problem, and they will get better on their own. Home treatment may help you feel better faster. The doctor has checked you carefully, but problems can develop later. If you notice any problems or new symptoms, get medical treatment right away. Follow-up care is a key part of your treatment and safety. Be sure to make and go to all appointments, and call your doctor if you are having problems. It's also a good idea to know your test results and keep a list of the medicines you take. How can you care for yourself at home? · Do not drive if you have taken a prescription pain medicine. · Rest in a quiet, dark room until your headache is gone. Close your eyes and try to relax or go to sleep. Don't watch TV or read. · Put a cold, moist cloth or cold pack on the painful area for 10 to 20 minutes at a time. Put a thin cloth between the cold pack and your skin. · Use a warm, moist towel or a heating pad set on low to relax tight shoulder and neck muscles. · Have someone gently massage your neck and shoulders.   · Take pain medicines exactly as directed. ? If the doctor gave you a prescription medicine for pain, take it as prescribed. ? If you are not taking a prescription pain medicine, ask your doctor if you can take an over-the-counter medicine. · Be careful not to take pain medicine more often than the instructions allow, because you may get worse or more frequent headaches when the medicine wears off. · Do not ignore new symptoms that occur with a headache, such as a fever, weakness or numbness, vision changes, or confusion. These may be signs of a more serious problem. To prevent headaches  · Keep a headache diary so you can figure out what triggers your headaches. Avoiding triggers may help you prevent headaches. Record when each headache began, how long it lasted, and what the pain was like (throbbing, aching, stabbing, or dull). Write down any other symptoms you had with the headache, such as nausea, flashing lights or dark spots, or sensitivity to bright light or loud noise. Note if the headache occurred near your period. List anything that might have triggered the headache, such as certain foods (chocolate, cheese, wine) or odors, smoke, bright light, stress, or lack of sleep. · Find healthy ways to deal with stress. Headaches are most common during or right after stressful times. Take time to relax before and after you do something that has caused a headache in the past.  · Try to keep your muscles relaxed by keeping good posture. Check your jaw, face, neck, and shoulder muscles for tension, and try relaxing them. When sitting at a desk, change positions often, and stretch for 30 seconds each hour. · Get plenty of sleep and exercise. · Eat regularly and well. Long periods without food can trigger a headache. · Treat yourself to a massage. Some people find that regular massages are very helpful in relieving tension. · Limit caffeine by not drinking too much coffee, tea, or soda.  But don't quit caffeine suddenly, because that can also give you headaches. · Reduce eyestrain from computers by blinking frequently and looking away from the computer screen every so often. Make sure you have proper eyewear and that your monitor is set up properly, about an arm's length away. · Seek help if you have depression or anxiety. Your headaches may be linked to these conditions. Treatment can both prevent headaches and help with symptoms of anxiety or depression. When should you call for help? Call 911 anytime you think you may need emergency care. For example, call if:    · You have signs of a stroke. These may include:  ? Sudden numbness, paralysis, or weakness in your face, arm, or leg, especially on only one side of your body. ? Sudden vision changes. ? Sudden trouble speaking. ? Sudden confusion or trouble understanding simple statements. ? Sudden problems with walking or balance. ? A sudden, severe headache that is different from past headaches.    Call your doctor now or seek immediate medical care if:    · You have a new or worse headache.     · Your headache gets much worse. Where can you learn more? Go to http://donald-liana.info/. Enter M271 in the search box to learn more about \"Headache: Care Instructions. \"  Current as of: June 4, 2018  Content Version: 11.8  © 6400-0415 [employer]. Care instructions adapted under license by [employer] (which disclaims liability or warranty for this information). If you have questions about a medical condition or this instruction, always ask your healthcare professional. [place] [address] any warranty or liability for your use of this information. Viral Respiratory Infection: Care Instructions  Your Care Instructions    Viruses are very small organisms. They grow in number after they enter your body. There are many types that cause different illnesses, such as colds and the mumps.   The symptoms of a viral respiratory infection often start quickly. They include a fever, sore throat, and runny nose. You may also just not feel well. Or you may not want to eat much. Most viral respiratory infections are not serious. They usually get better with time and self-care. Antibiotics are not used to treat a viral infection. That's because antibiotics will not help cure a viral illness. In some cases, antiviral medicine can help your body fight a serious viral infection. Follow-up care is a key part of your treatment and safety. Be sure to make and go to all appointments, and call your doctor if you are having problems. It's also a good idea to know your test results and keep a list of the medicines you take. How can you care for yourself at home? · Rest as much as possible until you feel better. · Be safe with medicines. Take your medicine exactly as prescribed. Call your doctor if you think you are having a problem with your medicine. You will get more details on the specific medicine your doctor prescribes. · Take an over-the-counter pain medicine, such as acetaminophen (Tylenol), ibuprofen (Advil, Motrin), or naproxen (Aleve), as needed for pain and fever. Read and follow all instructions on the label. Do not give aspirin to anyone younger than 20. It has been linked to Reye syndrome, a serious illness. · Drink plenty of fluids, enough so that your urine is light yellow or clear like water. Hot fluids, such as tea or soup, may help relieve congestion in your nose and throat. If you have kidney, heart, or liver disease and have to limit fluids, talk with your doctor before you increase the amount of fluids you drink. · Try to clear mucus from your lungs by breathing deeply and coughing. · Gargle with warm salt water once an hour. This can help reduce swelling and throat pain. Use 1 teaspoon of salt mixed in 1 cup of warm water. · Do not smoke or allow others to smoke around you.  If you need help quitting, talk to your doctor about stop-smoking programs and medicines. These can increase your chances of quitting for good. To avoid spreading the virus  · Cough or sneeze into a tissue. Then throw the tissue away. · If you don't have a tissue, use your hand to cover your cough or sneeze. Then clean your hand. You can also cough into your sleeve. · Wash your hands often. Use soap and warm water. Wash for 15 to 20 seconds each time. · If you don't have soap and water near you, you can clean your hands with alcohol wipes or gel. When should you call for help? Call your doctor now or seek immediate medical care if:    · You have a new or higher fever.     · Your fever lasts more than 48 hours.     · You have trouble breathing.     · You have a fever with a stiff neck or a severe headache.     · You are sensitive to light.     · You feel very sleepy or confused.    Watch closely for changes in your health, and be sure to contact your doctor if:    · You do not get better as expected. Where can you learn more? Go to http://donald-liana.info/. Enter Z182 in the search box to learn more about \"Viral Respiratory Infection: Care Instructions. \"  Current as of: December 6, 2017  Content Version: 11.8  © 9787-8386 [employer]. Care instructions adapted under license by [employer] (which disclaims liability or warranty for this information). If you have questions about a medical condition or this instruction, always ask your healthcare professional. [place] [address] any warranty or liability for your use of this information.

## 2018-12-27 LAB
ATRIAL RATE: 68 BPM
CALCULATED P AXIS, ECG09: 22 DEGREES
CALCULATED R AXIS, ECG10: 3 DEGREES
CALCULATED T AXIS, ECG11: -2 DEGREES
DIAGNOSIS, 93000: NORMAL
P-R INTERVAL, ECG05: 148 MS
Q-T INTERVAL, ECG07: 404 MS
QRS DURATION, ECG06: 94 MS
QTC CALCULATION (BEZET), ECG08: 429 MS
VENTRICULAR RATE, ECG03: 68 BPM

## 2019-03-25 ENCOUNTER — APPOINTMENT (OUTPATIENT)
Dept: CT IMAGING | Age: 44
End: 2019-03-25
Attending: EMERGENCY MEDICINE
Payer: MEDICAID

## 2019-03-25 ENCOUNTER — APPOINTMENT (OUTPATIENT)
Dept: CT IMAGING | Age: 44
End: 2019-03-25
Attending: FAMILY MEDICINE
Payer: MEDICAID

## 2019-03-25 ENCOUNTER — APPOINTMENT (OUTPATIENT)
Dept: MRI IMAGING | Age: 44
End: 2019-03-25
Attending: FAMILY MEDICINE
Payer: MEDICAID

## 2019-03-25 ENCOUNTER — HOSPITAL ENCOUNTER (OUTPATIENT)
Age: 44
Setting detail: OBSERVATION
Discharge: HOME | End: 2019-03-26
Attending: EMERGENCY MEDICINE | Admitting: FAMILY MEDICINE
Payer: MEDICAID

## 2019-03-25 ENCOUNTER — APPOINTMENT (OUTPATIENT)
Dept: NON INVASIVE DIAGNOSTICS | Age: 44
End: 2019-03-25
Attending: FAMILY MEDICINE
Payer: MEDICAID

## 2019-03-25 DIAGNOSIS — R29.898 RIGHT LEG WEAKNESS: ICD-10-CM

## 2019-03-25 DIAGNOSIS — R07.9 CHEST PAIN, UNSPECIFIED TYPE: Primary | ICD-10-CM

## 2019-03-25 DIAGNOSIS — R29.90 STROKE-LIKE SYMPTOMS: ICD-10-CM

## 2019-03-25 DIAGNOSIS — R20.0 RIGHT ARM NUMBNESS: ICD-10-CM

## 2019-03-25 DIAGNOSIS — R20.0 RIGHT LEG NUMBNESS: ICD-10-CM

## 2019-03-25 DIAGNOSIS — R29.898 RIGHT ARM WEAKNESS: ICD-10-CM

## 2019-03-25 DIAGNOSIS — M54.12 RIGHT CERVICAL RADICULOPATHY: ICD-10-CM

## 2019-03-25 PROBLEM — G45.9 TIA (TRANSIENT ISCHEMIC ATTACK): Status: ACTIVE | Noted: 2019-03-25

## 2019-03-25 LAB
ALBUMIN SERPL-MCNC: 3.7 G/DL (ref 3.5–5)
ALBUMIN/GLOB SERPL: 1.1 {RATIO} (ref 1.1–2.2)
ALP SERPL-CCNC: 102 U/L (ref 45–117)
ALT SERPL-CCNC: 31 U/L (ref 12–78)
ANION GAP SERPL CALC-SCNC: 4 MMOL/L (ref 5–15)
APPEARANCE UR: CLEAR
AST SERPL-CCNC: 13 U/L (ref 15–37)
ATRIAL RATE: 81 BPM
BACTERIA URNS QL MICRO: NEGATIVE /HPF
BASOPHILS # BLD: 0.1 K/UL (ref 0–0.1)
BASOPHILS NFR BLD: 1 % (ref 0–1)
BILIRUB SERPL-MCNC: 0.5 MG/DL (ref 0.2–1)
BILIRUB UR QL: NEGATIVE
BUN SERPL-MCNC: 24 MG/DL (ref 6–20)
BUN/CREAT SERPL: 21 (ref 12–20)
CALCIUM SERPL-MCNC: 8.1 MG/DL (ref 8.5–10.1)
CALCULATED P AXIS, ECG09: 20 DEGREES
CALCULATED R AXIS, ECG10: 1 DEGREES
CALCULATED T AXIS, ECG11: 11 DEGREES
CHLORIDE SERPL-SCNC: 111 MMOL/L (ref 97–108)
CO2 SERPL-SCNC: 25 MMOL/L (ref 21–32)
COLOR UR: ABNORMAL
COMMENT, HOLDF: NORMAL
CREAT SERPL-MCNC: 1.12 MG/DL (ref 0.7–1.3)
DIAGNOSIS, 93000: NORMAL
DIFFERENTIAL METHOD BLD: NORMAL
ECHO AO ROOT DIAM: 2.8 CM
ECHO LV E' LATERAL VELOCITY: 9.27 CM/S
ECHO LV E' SEPTAL VELOCITY: 9.64 CM/S
EOSINOPHIL # BLD: 0.3 K/UL (ref 0–0.4)
EOSINOPHIL NFR BLD: 4 % (ref 0–7)
EPITH CASTS URNS QL MICRO: ABNORMAL /LPF
ERYTHROCYTE [DISTWIDTH] IN BLOOD BY AUTOMATED COUNT: 12.4 % (ref 11.5–14.5)
EST. AVERAGE GLUCOSE BLD GHB EST-MCNC: 111 MG/DL
GLOBULIN SER CALC-MCNC: 3.4 G/DL (ref 2–4)
GLUCOSE BLD STRIP.AUTO-MCNC: 115 MG/DL (ref 65–100)
GLUCOSE BLD STRIP.AUTO-MCNC: 120 MG/DL (ref 65–100)
GLUCOSE BLD STRIP.AUTO-MCNC: 131 MG/DL (ref 65–100)
GLUCOSE SERPL-MCNC: 107 MG/DL (ref 65–100)
GLUCOSE UR STRIP.AUTO-MCNC: NEGATIVE MG/DL
HBA1C MFR BLD: 5.5 % (ref 4.2–6.3)
HCT VFR BLD AUTO: 46.3 % (ref 36.6–50.3)
HGB BLD-MCNC: 15.4 G/DL (ref 12.1–17)
HGB UR QL STRIP: NEGATIVE
HYALINE CASTS URNS QL MICRO: ABNORMAL /LPF (ref 0–5)
IMM GRANULOCYTES # BLD AUTO: 0 K/UL (ref 0–0.04)
IMM GRANULOCYTES NFR BLD AUTO: 0 % (ref 0–0.5)
KETONES UR QL STRIP.AUTO: NEGATIVE MG/DL
LEUKOCYTE ESTERASE UR QL STRIP.AUTO: NEGATIVE
LYMPHOCYTES # BLD: 1.9 K/UL (ref 0.8–3.5)
LYMPHOCYTES NFR BLD: 25 % (ref 12–49)
MCH RBC QN AUTO: 29.3 PG (ref 26–34)
MCHC RBC AUTO-ENTMCNC: 33.3 G/DL (ref 30–36.5)
MCV RBC AUTO: 88.2 FL (ref 80–99)
MONOCYTES # BLD: 0.5 K/UL (ref 0–1)
MONOCYTES NFR BLD: 7 % (ref 5–13)
NEUTS SEG # BLD: 4.8 K/UL (ref 1.8–8)
NEUTS SEG NFR BLD: 63 % (ref 32–75)
NITRITE UR QL STRIP.AUTO: NEGATIVE
NRBC # BLD: 0 K/UL (ref 0–0.01)
NRBC BLD-RTO: 0 PER 100 WBC
P-R INTERVAL, ECG05: 138 MS
PH UR STRIP: 6.5 [PH] (ref 5–8)
PLATELET # BLD AUTO: 188 K/UL (ref 150–400)
PMV BLD AUTO: 9.8 FL (ref 8.9–12.9)
POTASSIUM SERPL-SCNC: 4 MMOL/L (ref 3.5–5.1)
PROT SERPL-MCNC: 7.1 G/DL (ref 6.4–8.2)
PROT UR STRIP-MCNC: NEGATIVE MG/DL
Q-T INTERVAL, ECG07: 396 MS
QRS DURATION, ECG06: 98 MS
QTC CALCULATION (BEZET), ECG08: 460 MS
RBC # BLD AUTO: 5.25 M/UL (ref 4.1–5.7)
RBC #/AREA URNS HPF: ABNORMAL /HPF (ref 0–5)
SAMPLES BEING HELD,HOLD: NORMAL
SERVICE CMNT-IMP: ABNORMAL
SODIUM SERPL-SCNC: 140 MMOL/L (ref 136–145)
SP GR UR REFRACTOMETRY: >1.03 (ref 1–1.03)
TROPONIN I BLD-MCNC: <0.04 NG/ML (ref 0–0.08)
TROPONIN I SERPL-MCNC: <0.05 NG/ML
TROPONIN I SERPL-MCNC: <0.05 NG/ML
UR CULT HOLD, URHOLD: NORMAL
UROBILINOGEN UR QL STRIP.AUTO: 0.2 EU/DL (ref 0.2–1)
VENTRICULAR RATE, ECG03: 81 BPM
WBC # BLD AUTO: 7.6 K/UL (ref 4.1–11.1)
WBC URNS QL MICRO: ABNORMAL /HPF (ref 0–4)

## 2019-03-25 PROCEDURE — 74011250636 HC RX REV CODE- 250/636: Performed by: FAMILY MEDICINE

## 2019-03-25 PROCEDURE — 74011636320 HC RX REV CODE- 636/320: Performed by: RADIOLOGY

## 2019-03-25 PROCEDURE — 74011250636 HC RX REV CODE- 250/636: Performed by: EMERGENCY MEDICINE

## 2019-03-25 PROCEDURE — 36415 COLL VENOUS BLD VENIPUNCTURE: CPT

## 2019-03-25 PROCEDURE — 74011250637 HC RX REV CODE- 250/637: Performed by: FAMILY MEDICINE

## 2019-03-25 PROCEDURE — 82962 GLUCOSE BLOOD TEST: CPT

## 2019-03-25 PROCEDURE — 96372 THER/PROPH/DIAG INJ SC/IM: CPT

## 2019-03-25 PROCEDURE — 70551 MRI BRAIN STEM W/O DYE: CPT

## 2019-03-25 PROCEDURE — 93005 ELECTROCARDIOGRAM TRACING: CPT

## 2019-03-25 PROCEDURE — 71275 CT ANGIOGRAPHY CHEST: CPT

## 2019-03-25 PROCEDURE — 83036 HEMOGLOBIN GLYCOSYLATED A1C: CPT

## 2019-03-25 PROCEDURE — C8929 TTE W OR WO FOL WCON,DOPPLER: HCPCS

## 2019-03-25 PROCEDURE — 70450 CT HEAD/BRAIN W/O DYE: CPT

## 2019-03-25 PROCEDURE — 81001 URINALYSIS AUTO W/SCOPE: CPT

## 2019-03-25 PROCEDURE — 85025 COMPLETE CBC W/AUTO DIFF WBC: CPT

## 2019-03-25 PROCEDURE — 84484 ASSAY OF TROPONIN QUANT: CPT

## 2019-03-25 PROCEDURE — 0042T CT CODE NEURO PERF W CBF: CPT

## 2019-03-25 PROCEDURE — 80053 COMPREHEN METABOLIC PANEL: CPT

## 2019-03-25 PROCEDURE — 70496 CT ANGIOGRAPHY HEAD: CPT

## 2019-03-25 PROCEDURE — 74011000258 HC RX REV CODE- 258: Performed by: RADIOLOGY

## 2019-03-25 PROCEDURE — 74011000250 HC RX REV CODE- 250: Performed by: EMERGENCY MEDICINE

## 2019-03-25 PROCEDURE — 99218 HC RM OBSERVATION: CPT

## 2019-03-25 PROCEDURE — 99285 EMERGENCY DEPT VISIT HI MDM: CPT

## 2019-03-25 PROCEDURE — 72141 MRI NECK SPINE W/O DYE: CPT

## 2019-03-25 RX ORDER — PRAVASTATIN SODIUM 40 MG/1
40 TABLET ORAL
Status: DISCONTINUED | OUTPATIENT
Start: 2019-03-25 | End: 2019-03-26 | Stop reason: HOSPADM

## 2019-03-25 RX ORDER — BUSPIRONE HYDROCHLORIDE 5 MG/1
5 TABLET ORAL DAILY
Status: DISCONTINUED | OUTPATIENT
Start: 2019-03-26 | End: 2019-03-26 | Stop reason: HOSPADM

## 2019-03-25 RX ORDER — SODIUM CHLORIDE 0.9 % (FLUSH) 0.9 %
5-40 SYRINGE (ML) INJECTION AS NEEDED
Status: DISCONTINUED | OUTPATIENT
Start: 2019-03-25 | End: 2019-03-26 | Stop reason: HOSPADM

## 2019-03-25 RX ORDER — ACETAMINOPHEN 650 MG/1
650 SUPPOSITORY RECTAL
Status: DISCONTINUED | OUTPATIENT
Start: 2019-03-25 | End: 2019-03-26 | Stop reason: HOSPADM

## 2019-03-25 RX ORDER — FAMOTIDINE 20 MG/1
20 TABLET, FILM COATED ORAL EVERY 12 HOURS
Status: DISCONTINUED | OUTPATIENT
Start: 2019-03-25 | End: 2019-03-26 | Stop reason: HOSPADM

## 2019-03-25 RX ORDER — SODIUM CHLORIDE 0.9 % (FLUSH) 0.9 %
10 SYRINGE (ML) INJECTION
Status: COMPLETED | OUTPATIENT
Start: 2019-03-25 | End: 2019-03-25

## 2019-03-25 RX ORDER — BUSPIRONE HYDROCHLORIDE 5 MG/1
5 TABLET ORAL DAILY
COMMUNITY
End: 2019-07-08

## 2019-03-25 RX ORDER — SODIUM CHLORIDE 0.9 % (FLUSH) 0.9 %
20 SYRINGE (ML) INJECTION
Status: COMPLETED | OUTPATIENT
Start: 2019-03-25 | End: 2019-03-25

## 2019-03-25 RX ORDER — SODIUM CHLORIDE 0.9 % (FLUSH) 0.9 %
5-40 SYRINGE (ML) INJECTION EVERY 8 HOURS
Status: DISCONTINUED | OUTPATIENT
Start: 2019-03-25 | End: 2019-03-26 | Stop reason: HOSPADM

## 2019-03-25 RX ORDER — METOPROLOL TARTRATE 25 MG/1
12.5 TABLET, FILM COATED ORAL EVERY 12 HOURS
Status: DISCONTINUED | OUTPATIENT
Start: 2019-03-25 | End: 2019-03-26 | Stop reason: HOSPADM

## 2019-03-25 RX ORDER — SODIUM CHLORIDE 9 MG/ML
75 INJECTION, SOLUTION INTRAVENOUS CONTINUOUS
Status: DISCONTINUED | OUTPATIENT
Start: 2019-03-25 | End: 2019-03-26 | Stop reason: HOSPADM

## 2019-03-25 RX ORDER — HEPARIN SODIUM 5000 [USP'U]/ML
5000 INJECTION, SOLUTION INTRAVENOUS; SUBCUTANEOUS EVERY 8 HOURS
Status: DISCONTINUED | OUTPATIENT
Start: 2019-03-25 | End: 2019-03-26 | Stop reason: HOSPADM

## 2019-03-25 RX ORDER — ONDANSETRON 2 MG/ML
4 INJECTION INTRAMUSCULAR; INTRAVENOUS
Status: DISCONTINUED | OUTPATIENT
Start: 2019-03-25 | End: 2019-03-26 | Stop reason: HOSPADM

## 2019-03-25 RX ORDER — GUAIFENESIN 100 MG/5ML
81 LIQUID (ML) ORAL DAILY
Status: DISCONTINUED | OUTPATIENT
Start: 2019-03-26 | End: 2019-03-26 | Stop reason: HOSPADM

## 2019-03-25 RX ORDER — AMLODIPINE BESYLATE 5 MG/1
5 TABLET ORAL DAILY
Status: DISCONTINUED | OUTPATIENT
Start: 2019-03-26 | End: 2019-03-26 | Stop reason: HOSPADM

## 2019-03-25 RX ORDER — INSULIN LISPRO 100 [IU]/ML
INJECTION, SOLUTION INTRAVENOUS; SUBCUTANEOUS
Status: DISCONTINUED | OUTPATIENT
Start: 2019-03-25 | End: 2019-03-26 | Stop reason: HOSPADM

## 2019-03-25 RX ORDER — DEXTROSE 50 % IN WATER (D50W) INTRAVENOUS SYRINGE
25-50 AS NEEDED
Status: DISCONTINUED | OUTPATIENT
Start: 2019-03-25 | End: 2019-03-26 | Stop reason: HOSPADM

## 2019-03-25 RX ORDER — ACETAMINOPHEN 325 MG/1
650 TABLET ORAL
Status: DISCONTINUED | OUTPATIENT
Start: 2019-03-25 | End: 2019-03-26 | Stop reason: HOSPADM

## 2019-03-25 RX ORDER — MAGNESIUM SULFATE 100 %
4 CRYSTALS MISCELLANEOUS AS NEEDED
Status: DISCONTINUED | OUTPATIENT
Start: 2019-03-25 | End: 2019-03-26 | Stop reason: HOSPADM

## 2019-03-25 RX ADMIN — Medication 10 ML: at 22:34

## 2019-03-25 RX ADMIN — Medication 10 ML: at 08:39

## 2019-03-25 RX ADMIN — IOPAMIDOL 120 ML: 755 INJECTION, SOLUTION INTRAVENOUS at 08:39

## 2019-03-25 RX ADMIN — SODIUM CHLORIDE 100 ML: 900 INJECTION, SOLUTION INTRAVENOUS at 08:39

## 2019-03-25 RX ADMIN — Medication 10 ML: at 11:46

## 2019-03-25 RX ADMIN — HEPARIN SODIUM 5000 UNITS: 5000 INJECTION, SOLUTION INTRAVENOUS; SUBCUTANEOUS at 15:52

## 2019-03-25 RX ADMIN — PRAVASTATIN SODIUM 40 MG: 40 TABLET ORAL at 22:34

## 2019-03-25 RX ADMIN — Medication 20 ML: at 15:56

## 2019-03-25 RX ADMIN — METOPROLOL TARTRATE 12.5 MG: 25 TABLET ORAL at 22:33

## 2019-03-25 RX ADMIN — FAMOTIDINE 20 MG: 20 TABLET ORAL at 18:16

## 2019-03-25 RX ADMIN — SODIUM CHLORIDE 75 ML/HR: 900 INJECTION, SOLUTION INTRAVENOUS at 18:16

## 2019-03-25 RX ADMIN — SODIUM CHLORIDE 100 ML: 900 INJECTION, SOLUTION INTRAVENOUS at 11:47

## 2019-03-25 RX ADMIN — IOPAMIDOL 100 ML: 755 INJECTION, SOLUTION INTRAVENOUS at 11:47

## 2019-03-25 RX ADMIN — SODIUM CHLORIDE 1.5 ML: 9 INJECTION INTRAMUSCULAR; INTRAVENOUS; SUBCUTANEOUS at 15:56

## 2019-03-25 NOTE — ROUTINE PROCESS
TRANSFER - OUT REPORT: 
 
Verbal report given to [facility] RN(name) on Tor Bolds  being transferred to [facility](unit) for routine progression of care Report consisted of patients Situation, Background, Assessment and  
Recommendations(SBAR). Information from the following report(s) SBAR, Kardex, ED Summary and MAR was reviewed with the receiving nurse. Lines:  
Peripheral IV 03/25/19 Left Antecubital (Active) Site Assessment Clean, dry, & intact 3/25/2019  8:36 AM  
Phlebitis Assessment 0 3/25/2019  8:36 AM  
Infiltration Assessment 0 3/25/2019  8:36 AM  
Dressing Status Clean, dry, & intact 3/25/2019  8:36 AM  
  
 
Opportunity for questions and clarification was provided. Patient transported with: 
 [employer]

## 2019-03-25 NOTE — PROGRESS NOTES
Consulted Dr Lux Bianchi, no acute inpatient intervention Monitor patient and outpatient f/u with Dr Lux Bianchi

## 2019-03-25 NOTE — ED NOTES
Patient called out to use the bathroom. Patient disconnected from monitor and walked to bathroom without any distress.

## 2019-03-25 NOTE — CONSULTS
NEUROLOGY  3/25/2019     Consulted by: Claudia Mancilla, *        Patient ID:  aKren Tompkins  [id number]  41 y.o.  1975    Chief Complaint   Patient presents with    Chest Pain    Numbness       HPI    Mr. Xiomy Bazan is a 57-year-old gentleman with multiple risk factors for stroke to include untreated ANA, CAD, diabetes who is here because he woke up from sleep this morning unexpectedly with right arm pain with pins and needles sensation. Some subjective right-sided chest pain. He came to the hospital.  He tells me  that the right arm still feels like pins and needles radiating down to his fingers. If he moves his arm up or down he can reproduce the symptoms. No leg or face symptoms. No speech change. He does get chronic headaches for a few years now. CTA was done showing incidental small aneurysm which can be followed as an outpatient per neuro interventional.  MRI was just done today. He has a lot of T2 changes and atrophy but no acute issue. C-spine MRI was also done for his symptoms showing mild multilevel disease with some stenosis. He otherwise feels back to his baseline. He tells me he is lost over 100 pounds in the past year. He has not been taking aspirin admittedly for several months. Review of Systems   Constitutional: Negative for malaise/fatigue. Eyes: Negative for double vision. Musculoskeletal: Positive for joint pain, myalgias and neck pain. Neurological: Positive for tingling, sensory change and headaches. Negative for dizziness and speech change. All other systems reviewed and are negative.       Past Medical History:   Diagnosis Date    Asthma     Bronchitis     Chronic pain     Depression     Diabetes (HCC)     type 2    Gastrointestinal disorder     GERD    Hypertension     MI (myocardial infarction) ([facility] [address]     x 2 per patient    Murmur     per patient    Otitis media     Sinus infection     Vertigo 3/4/2013     Family History   Problem Relation Age of Onset    Kidney Disease Mother     Hypertension Mother     Diabetes Mother     Cancer Mother         uterine    Elevated Lipids Mother     Coronary Artery Disease Father         premature    Hypertension Father     Diabetes Father     High Cholesterol Father     Cancer Father         skin    Elevated Lipids Father     Heart Disease Father     Cancer Sister         uterine    Hypertension Brother     High Cholesterol Brother     Coronary Artery Disease Paternal Uncle     Coronary Artery Disease Paternal Grandmother     Coronary Artery Disease Paternal Grandfather      Social History     Socioeconomic History    Marital status:      Spouse name: Not on file    Number of children: Not on file    Years of education: Not on file    Highest education level: Not on file   Occupational History    Not on file   Social Needs    Financial resource strain: Not on file    Food insecurity:     Worry: Not on file     Inability: Not on file    Transportation needs:     Medical: Not on file     Non-medical: Not on file   Tobacco Use    Smoking status: Former Smoker    Smokeless tobacco: Never Used    Tobacco comment: quit over 10 years   Substance and Sexual Activity    Alcohol use: Yes     Comment: rare    Drug use: No    Sexual activity: Yes     Partners: Female   Lifestyle    Physical activity:     Days per week: Not on file     Minutes per session: Not on file    Stress: Not on file   Relationships    Social connections:     Talks on phone: Not on file     Gets together: Not on file     Attends [employer] service: Not on file     Active member of club or organization: Not on file     Attends meetings of clubs or organizations: Not on file     Relationship status: Not on file    Intimate partner violence:     Fear of current or ex partner: Not on file     Emotionally abused: Not on file     Physically abused: Not on file     Forced sexual activity: Not on file   Other Topics Concern    Not on file   Social History Narrative    Not on file     Current Facility-Administered Medications   Medication Dose Route Frequency    [START ON 3/26/2019] amLODIPine (NORVASC) tablet 5 mg  5 mg Oral DAILY    [START ON 3/26/2019] busPIRone (BUSPAR) tablet 5 mg  5 mg Oral DAILY    sodium chloride (NS) flush 5-40 mL  5-40 mL IntraVENous Q8H    sodium chloride (NS) flush 5-40 mL  5-40 mL IntraVENous PRN    acetaminophen (TYLENOL) tablet 650 mg  650 mg Oral Q4H PRN    Or    acetaminophen (TYLENOL) solution 650 mg  650 mg Per NG tube Q4H PRN    Or    acetaminophen (TYLENOL) suppository 650 mg  650 mg Rectal Q4H PRN    0.9% sodium chloride infusion  75 mL/hr IntraVENous CONTINUOUS    ondansetron (ZOFRAN) injection 4 mg  4 mg IntraVENous Q6H PRN    [START ON 3/26/2019] aspirin chewable tablet 81 mg  81 mg Oral DAILY    pravastatin (PRAVACHOL) tablet 40 mg  40 mg Oral QHS    famotidine (PEPCID) tablet 20 mg  20 mg Oral Q12H    heparin (porcine) injection 5,000 Units  5,000 Units SubCUTAneous Q8H    metoprolol tartrate (LOPRESSOR) tablet 12.5 mg  12.5 mg Oral Q12H    glucose chewable tablet 16 g  4 Tab Oral PRN    dextrose (D50W) injection syrg 12.5-25 g  25-50 mL IntraVENous PRN    glucagon (GLUCAGEN) injection 1 mg  1 mg IntraMUSCular PRN    insulin lispro (HUMALOG) injection   SubCUTAneous AC&HS     Current Outpatient Medications   Medication Sig    busPIRone (BUSPAR) 5 mg tablet Take 5 mg by mouth daily.  amLODIPine (NORVASC) 5 mg tablet TAKE 1 TABLET BY MOUTH EVERY DAY    topiramate (TOPAMAX) 25 mg tablet TAKE 1 TABLET BY MOUTH EVERY DAY WITH DINNER    metFORMIN (GLUCOPHAGE) 500 mg tablet TAKE 1 TABLET BY MOUTH TWICE PER DAY WITH MEALS     Allergies   Allergen Reactions    Ativan [Lorazepam] Palpitations     States is not allergic     Avelox [Moxifloxacin] Palpitations     States is not allergic       Visit Vitals  /80 (BP 1 Location: Right arm, BP Patient Position:  At rest)   Pulse 94   Temp 98.5 °F (36.9 °C)   Resp 16   Ht 5' 8\" (1.727 m)   Wt 127.7 kg (281 lb 8.4 oz)   SpO2 98%   BMI 42.81 kg/m²     Physical Exam   Constitutional: He is oriented to person, place, and time. He appears well-developed and well-nourished. Cardiovascular: Normal rate. Pulmonary/Chest: Effort normal.   Neurological: He is oriented to person, place, and time. He has normal strength. He has a normal Finger-Nose-Finger Test.   Skin: Skin is warm and dry. Psychiatric: His behavior is normal.   Vitals reviewed. Neurologic Exam     Mental Status   Oriented to person, place, and time. Cranial Nerves   Cranial nerves II through XII intact. Motor Exam   Muscle bulk: normal    Strength   Strength 5/5 throughout.      Sensory Exam   Light touch normal.     Gait, Coordination, and Reflexes     Gait  Gait: (Deferred)    Coordination   Finger to nose coordination: normal    Tremor   Resting tremor: absent           Lab Results   Component Value Date/Time    WBC 7.6 03/25/2019 08:35 AM    HGB 15.4 03/25/2019 08:35 AM    Hemoglobin (POC) 15.0 06/10/2011 12:40 AM    HCT 46.3 03/25/2019 08:35 AM    Hematocrit (POC) 44 06/10/2011 12:40 AM    PLATELET 952 94/10/8191 08:35 AM    MCV 88.2 03/25/2019 08:35 AM     Lab Results   Component Value Date/Time    Hemoglobin A1c 5.5 03/25/2019 08:35 AM    Hemoglobin A1c 5.1 01/04/2018 08:32 AM    Hemoglobin A1c 5.3 08/28/2017 12:00 AM    Glucose 107 (H) 03/25/2019 08:35 AM    Glucose (POC) 131 (H) 03/25/2019 08:30 AM    LDL, calculated 119 (H) 01/04/2018 08:32 AM    Creatinine (POC) 1.1 06/10/2011 12:40 AM    Creatinine 1.12 03/25/2019 08:35 AM      Lab Results   Component Value Date/Time    Cholesterol, total 189 01/04/2018 08:32 AM    HDL Cholesterol 45 01/04/2018 08:32 AM    LDL, calculated 119 (H) 01/04/2018 08:32 AM    Triglyceride 123 01/04/2018 08:32 AM    CHOL/HDL Ratio 4.0 08/31/2009 09:43 AM     Lab Results   Component Value Date/Time    ALT (SGPT) 31 03/25/2019 08:35 AM    AST (SGOT) 13 (L) 03/25/2019 08:35 AM    Alk. phosphatase 102 03/25/2019 08:35 AM    Bilirubin, total 0.5 03/25/2019 08:35 AM    Albumin 3.7 03/25/2019 08:35 AM    Protein, total 7.1 03/25/2019 08:35 AM    PLATELET 418 11/44/1223 08:35 AM        CT Results (maximum last 3): Results from [place] encounter on 03/25/19   CTA CHEST W OR W WO CONT    Narrative EXAM: CT ANGIOGRAPHY CHEST   INDICATION: Chest pain and numbness, evaluate for dissection. COMPARISON: 12/31/2017. CONTRAST: 100 mL of Isovue-370. TECHNIQUE:   Precontrast  images were obtained to localize the volume for acquisition. Multislice helical CT arteriography was performed from the thoracic inlet to the  midabdomen during uneventful rapid bolus intravenous contrast administration. Lung and soft tissue windows were generated. Coronal and sagittal  reformatted  images were also generated and 3D post processing consisting of coronal maximum  intensity projection images was performed. CT dose reduction was achieved  through use of a standardized protocol tailored for this examination and  automatic exposure control for dose modulation. FINDINGS:  LOWER NECK: The visualized portions of the thyroid and structures of the lower  neck are within normal limits. LUNGS: The inspiration is shallow and there is mild groundglass opacity  throughout the lungs. The lungs are clear of mass, nodule, airspace disease or  edema. PLEURA: There is no pleural effusion or pneumothorax. PULMONARY ARTERIES: The pulmonary arteries are well enhanced and no pulmonary  emboli are identified. AORTA: The aorta enhances normally without evidence of aneurysm or dissection. MEDIASTINUM: There is no mediastinal or hilar adenopathy or mass. BONES AND SOFT TISSUES: The bones and soft tissues of the chest wall are within  normal limits. UPPER ABDOMEN: The patient is obese and the attenuation of the liver is  diffusely decreased.  The visualized portions of the upper abdominal organs are  normal.      Impression IMPRESSION: No aortic dissection or other acute abnormality. CT CODE NEURO PERF W CBF    Narrative CLINICAL HISTORY: Right arm and leg weakness and numbness    EXAMINATION:  CT ANGIOGRAPHY HEAD AND NECK, CT PERFUSION    COMPARISON: CT head February 19, 2013    TECHNIQUE:  Following the uneventful administration of iodinated contrast  material, axial CT angiography of the head and neck was performed. Delayed axial  images through the head were also obtained. Coronal and sagittal reconstructions  were obtained. Manual postprocessing of images was performed. 3-D  Sagittal  maximal intensity projection images were obtained. 3-D Coronal maximal  intensity projections were obtained. CT brain perfusion was performed with  generation of hemodynamic maps of multiple parameters, including cerebral blood  flow, cerebral blood volume, mean transit time, and TMAX. CT dose reduction was  achieved through use of a standardized protocol tailored for this examination  and automatic exposure control for dose modulation. Adaptive statistical  iterative reconstruction (ASIR) was utilized. FINDINGS:    Delayed contrast-enhanced head CT:    Ventricular prominence is chronic. Mild nonspecific scattered hypodensities in  the periventricular white matter, also present on prior studies. There is no  acute intra or extra-axial hemorrhage. The basal cisterns are clear. The  paranasal sinuses are clear. CTA NECK:    Great vessels: Normal arch anatomy with the origins patent. Right subclavian artery: Patent    Left subclavian artery: Patent     Right common carotid artery: Patent     Left common carotid artery: Patent     Cervical right internal carotid artery: Patent with no significant stenosis by  NASCET criteria. Cervical left internal carotid artery: Patent with no significant stenosis by  NASCET criteria.     Right vertebral artery: Patent    Left vertebral artery: Patent    The lung apices are clear. The thyroid is homogeneous. No cervical  lymphadenopathy. CTA HEAD:    Right cavernous internal carotid artery: Patent    Left cavernous internal carotid artery: Patent. Infundibulum versus 1 mm  inferiorly projecting aneurysm at the ICA terminus. Anterior cerebral arteries: Patent    Anterior communicating artery: Patent    Right middle cerebral artery: Patent    Left middle cerebral artery: Patent    Posterior communicating arteries: Diminutive    Posterior cerebral arteries: Patent    Basilar artery: Patent    Distal vertebral arteries: Patent    CT Perfusion:  Normal CT perfusion. Impression IMPRESSION:  1. Chronic ventriculomegaly and scattered hypodensities in the white matter,  nonspecific for a patient of this age. 2.  No evidence of large vessel occlusion or hemodynamically significant carotid  artery stenosis. 3.  Infundibular origin of the left posterior communicating artery versus 1 mm  inferiorly projecting aneurysm at the ICA terminus. MRI Results (maximum last 3): Results from [place] encounter on 03/25/19   MRI BRAIN WO CONT    Narrative EXAM:  MRI BRAIN WO CONT    INDICATION:    TIA. Right arm numbness. COMPARISON:  CT head 3/25/2019, MRI brain 3/15/2013. CONTRAST: None. TECHNIQUE:    Multiplanar multisequence acquisition without contrast of the brain. FINDINGS:  Generalized parenchymal volume loss with commensurate dilation of the sulci and  ventricular system. Numerous scattered white matter T2/FLAIR hyperintensities,  consistent with moderate chronic microvascular ischemic disease, and advanced  for patient age. These are unchanged since prior exam. There is no acute  infarct, hemorrhage, extra-axial fluid collection, or mass effect. There is no  cerebellar tonsillar herniation. Expected arterial flow-voids are present.     Scattered mucosal thickening in the bilateral ethmoidal air cells and sphenoid  sinuses. The mastoid air cells and middle ears are clear. The orbital contents  are within normal limits. No significant osseous or scalp lesions are  identified. Impression IMPRESSION:   1. No evidence of acute intracranial abnormality. 2. Unchanged generalized parenchymal volume loss and moderate chronic  microvascular ischemic disease, both of which are advanced for patient age. MRI CERV SPINE WO CONT    Narrative EXAM: MRI CERV SPINE WO CONT    INDICATION: Cervical radiculopathy. COMPARISON: CTA 3/25/2018    TECHNIQUE: MR imaging of the cervical spine was performed using the following  sequences: sagittal T1, T2, STIR;  axial T2, T1.     CONTRAST:  None. FINDINGS:    There is normal alignment of the cervical spine. Vertebral body heights are  maintained. Marrow signal is normal.    The craniocervical junction is intact. The course, caliber, and signal intensity  of the spinal cord are normal.      The paraspinal soft tissues are within normal limits. C2-C3: No herniation or stenosis. C3-C4: Moderate right neural foraminal narrowing. No significant spinal stenosis  or left neural foraminal narrowing. C4-C5: Mild broad-based central disc osteophyte complex causing mild spinal  stenosis. There is mild bilateral neural foraminal narrowing. C5-C6: Mild central disc osteophyte complex with mild spinal stenosis. There is  mild bilateral neural foraminal narrowing. C6-C7: No herniation or stenosis. C7-T1: No herniation or stenosis. Impression IMPRESSION:    1. Mild spinal stenosis. Mild bilateral neural foraminal narrowing at C4-5 and  C5-6.   2. Moderate right neural foraminal narrowing at C3-4. Results from [place] encounter on 09/11/14   MRI FOOT RT WO CONT    Narrative **Final Report**       ICD Codes / Adm. Diagnosis: 825.25  845.00 / Closed fracture of metatarsal    Sprain of ankle, unspecified   Examination:  MR FOOT WO CON RT  - [id number] - Sep 11 2014  7:40AM  Accession No:  [id number]  Reason:  Closed fracture of metatarsal bone(s), Sprain of ankle, unspecified   site, FOOT R      REPORT:  EXAM:  MRI right foot without contrast    INDICATION:  Lateral foot pain. Evaluate for fifth metatarsal stress   fracture. COMPARISON: None    TECHNIQUE: Axial, coronal, and sagittal MRI of the right forefoot in the T1,   T2, and inversion-recovery pulse sequences with and without fat saturation . CONTRAST: None. FINDINGS: A marker was placed over the lateral side of the foot at the level   of the mid fifth metatarsal.    Bone marrow: within normal limits. No fracture, dislocation, or marrow   replacing process. No evidence of stress reaction or periostitis. Joint fluid:  None. Tendons: Intact. Muscles: Within normal limits. Neurovascular bundles: Within normal limits. Articular cartilage:intact. No osteochondral lesion. Soft tissue mass: None. IMPRESSION: Unremarkable MRI of the right forefoot. Signing/Reading Doctor: Lex Farmer ([id number])    Approved: Lex Farmer ([id number])  Sep 11 2014  9:07AM                                      VAS/US/Carotid Doppler Results (maximum last 3): No results found for this or any previous visit. PET Results (maximum last 3): No results found for this or any previous visit. Assessment and Plan        46-year-old gentleman presenting with right arm painful paresthesias. Low suspicion for clinical stroke. MRI without any acute stroke. I did discuss with him the abnormal chronic findings on his brain MRI which probably is a result of his overall chronic health issues some of which are untreated. He needs to start being consistent with his CPAP machine and return to taking baby aspirin daily. Be aggressive on optimizing his health. He is at risk for early dementia or not to mention stroke and heart attack. I do not think this was a TIA either.   I would not do a stroke workup. I think his symptoms are more related to C-spine disease. I would recommend he follow-up with primary care. Consider outpatient physical therapy as a trial.  No additional workup from neurology. Signing off. Please call if needed.         [address] [facility], DO  NEUROLOGIST  Diplomate VIRA  3/25/2019

## 2019-03-25 NOTE — CONSULTS
CTA reviewed. I agree with official read. Probable small left PCOM infundibulum, less likely aneurysm. Incidental, not emergent. Can evaluate as outpatient. CTP appears normal.  No LVO. Incidental mild diffuse enlargement of ventricles for age. Correlate clinically.      Nita Rangel NIS  [phone]

## 2019-03-25 NOTE — PROGRESS NOTES
Reason for Admission:   Chest pain, history of MI 
                
RRAT Score:          6 Plan for utilizing home health:     None, independent in self care Likelihood of Readmission:  low Transition of Care Plan:     Met with pt at the bedside and observation letters provided and signed copies to go in thinned chart. Pt confirmed he recently lost health insurance due to being laid off. He does also confirm seeing Dr. Matthew Munoz, part of [facility] in [place]. Unsure if that will continue to be his plan. Discussed referral to [facility] and application for care card. Unsure if pt is taking medicine as prescribed prior to this hospitalization. Referred to [facility] for screening and care card application. Unit CM will follow.  
 
BOZENA Ortiz

## 2019-03-25 NOTE — H&P
[address] [facility] HISTORY AND PHYSICAL Name:  Gerda Moulton 
MR#:  [id number] :  1975 ACCOUNT #:  [de-identified] ADMIT DATE:  2019 CHIEF COMPLAINT:  Chest pain and right arm numbness. HISTORY OF PRESENT ILLNESS:  The patient is a 82-year-old gentleman with past medical history of recurrent depression; diabetes mellitus, type 2; hearing loss; vertigo; pulsatile tinnitus; anxiety; and GERD, presents to the hospital with the above-mentioned symptoms. History was primarily obtained from the patient. The patient reports that he was at baseline health today, reports that he went to sleep around 10:30 p.m. last night and woke up at 3:15 a.m. with onset of right-sided chest pain. The patient reports the pain was dull, diffuse, exacerbated when he moved his neck and also had some tingling, numbness in his right lower extremity. The patient reports that he felt somewhat weak in his right lower extremity, woke up this morning and continued to have this pain and discomfort, got concerned, and decided to come to the hospital.  The patient reports the chest pain radiates to his right shoulder. The patient reports that he has had history of coronary artery disease in the past and has had two \"minor heart attacks. \"  The patient reports the first one was in  and the second one was in 2018. The patient reports that he is supposed to be on aspirin, but had not been taking it for the past few months. Also reports that he has not seen a cardiologist \"for a while now. \"  The patient denies any other complaints or problems. Denies any headache, blurry vision, sore throat, trouble swallowing, any shortness of breath, cough, fever, chills, abdominal pain, constipation, diarrhea, urinary symptoms, focal or generalized neurological weakness, recent travel, sick contacts, falls, injuries, hematemesis, melena, hemoptysis, hematuria, or any other concerns or problems.   The patient also denies suicidal or homicidal ideations. PAST MEDICAL HISTORY:  See above. HOME MEDICATIONS:  Currently, the patient is on; 1. BuSpar 5 mg daily. 2.  Amlodipine 5 mg daily. 3.  Metformin 1 tablet b.i.d. SOCIAL HISTORY:  Former smoker. Occasional alcohol. No IV drug abuse. Lives at home. FAMILY HISTORY:  Mother had history of kidney disease, hypertension, diabetes, uterine cancer, elevated lipids. Father has history of coronary artery disease. Father has history of hypertension. Father has history of diabetes, high cholesterol. Father has history of skin cancer and elevated lipids. Sister has history of uterine cancer. ALLERGIES:  TO ATIVAN AND AVELOX. REVIEW OF SYSTEMS:  All systems were reviewed and were found to be essentially negative except for the symptoms mentioned above. PHYSICAL EXAMINATION: 
VITAL SIGNS:  Temperature 98.5, pulse 83, respiratory rate 20, blood pressure 156/93, pulse ox 97% on room air. GENERAL:  Alert x3, awake, in no acute distress, resting in bed, pleasant male, appears to be stated age. HEENT:  Pupils equal and reactive to light. Dry mucous membranes. Tympanic membranes clear. NECK:  Supple. CHEST:  Clear to auscultation bilaterally. CORONARY:  S1 and S2 were heard. ABDOMEN:  Soft, nontender, nondistended. Bowel sounds are physiological. 
EXTREMITIES:  No clubbing, no cyanosis, no edema. NEURO/PSYCH:  Pleasant mood and affect. Cranial nerves II-XII grossly intact. Sensory grossly within normal limits. DTRs 2+ x4. Strength 5/5 x4. SKIN:  Warm. LABORATORY DATA:  White count 7.6, hemoglobin 15.4, hematocrit 46.3, platelets 639. Sodium 140, potassium 4, chloride 111, bicarbonate 27, anion gap 4, glucose 107, BUN 24, creatinine 1.12, calcium 8.1, bilirubin total 0.5, ALT 31, AST 13, alkaline phosphatase 102, troponin less than 0.05.   CT of the head shows no acute abnormalities, nonspecific white matter disease. CTA of the head and neck shows chronic ventriculomegaly and scattered hyperdensities in the white matter, nonspecific for patient's age; no evidence of large vessel occlusion or hemodynamically significant carotid artery stenosis; infundibular region of the left posterior communicating artery versus 1 mm inferiorly projecting aneurysm at the ICA terminus. ASSESSMENT AND PLAN: 
1. Right arm numbness, unclear etiology. Cervical radiculopathy versus cerebrovascular accident versus other etiology. The patient will be observed on a Neuro/Tele bed. We will get an MRI of the brain, MRI of the cervical spine. Start the patient on aspirin. We will provide neurovascular checks, echocardiogram, and Neurology consult. Further intervention per hospital course. Continue to closely monitor. Reassess as needed. The patient will be on statin, pain control. 2.  Chest pain, does not appear to be cardiac in origin. We will get cardiac enzymes, telemetry monitoring, aspirin, pain control, MRI of the cervical spine to rule out radiculopathy, IV hydration, and supportive care. May consider getting a Cardiology consult if so needed. Further intervention per hospital course. 3.  Diabetes. The patient will be on sliding scale NovoLog insulin, Accu-Chek, diet control, and close monitoring. Reassess as needed. Hold metformin for tonight. 4.  Depression. The patient denies any suicidal or homicidal ideation. Continue home medications and continue to monitor. 5.  Gastrointestinal and deep venous thrombosis prophylaxis. The patient will be on heparin. Berlin Lemos MD MM/[id number]/BS_EDIT 
D:  03/25/2019 10:34 
T:  03/25/2019 11:45 JOB #:  [id number]

## 2019-03-25 NOTE — PROGRESS NOTES
TRANSFER - IN REPORT: 
 
Verbal report received from [id number] [facility] RN(name) on Kimberlyn Evangelista  being received from ED(unit) for routine progression of care Report consisted of patients Situation, Background, Assessment and  
Recommendations(SBAR). Information from the following report(s) SBAR, Kardex and Recent Results was reviewed with the receiving nurse. Opportunity for questions and clarification was provided. Assessment completed upon patients arrival to unit and care assumed.

## 2019-03-25 NOTE — ED TRIAGE NOTES
Triage note: Pt arrives with c/o mid to right chest pain and right arm/leg numbness and weakness at 0330 this morning. Pt went to bed last night and everything was normal. Hx of MI x 2 but pt states this does not feel the same. . LKW 2230 last night.

## 2019-03-25 NOTE — PROGRESS NOTES
Admission Medication Reconciliation: 
Information obtained from: Patient & Discharge Medication List (12/26/18) Significant PMH/Disease States:  
Past Medical History:  
Diagnosis Date Asthma Bronchitis Chronic pain Depression Diabetes ([facility] [address] type 2 Gastrointestinal disorder GERD Hypertension MI (myocardial infarction) ([facility]   
 x 2 per patient Murmur   
 per patient Otitis media Sinus infection Vertigo 3/4/2013 Chief Complaint for this Admission:  Chest Pain, Numbness Allergies:  Ativan [lorazepam] and Avelox [moxifloxacin] Prior to Admission Medications:  
Prior to Admission Medications Prescriptions Last Dose Informant Patient Reported? Taking? amLODIPine (NORVASC) 5 mg tablet 3/23/2019  No Yes Sig: TAKE 1 TABLET BY MOUTH EVERY DAY  
busPIRone (BUSPAR) 5 mg tablet 3/23/2019  Yes Yes Sig: Take 5 mg by mouth daily. metFORMIN (GLUCOPHAGE) 500 mg tablet 3/23/2019  No Yes Sig: TAKE 1 TABLET BY MOUTH TWICE PER DAY WITH MEALS  
topiramate (TOPAMAX) 25 mg tablet 3/23/2019  No Yes Sig: TAKE 1 TABLET BY MOUTH EVERY DAY WITH DINNER Facility-Administered Medications: None Comments/Recommendations: Added: None Removed: ASA, citalopram, codeine-guaifenesin liquid, fluticasone, ibuprofen Changed: buspirone (Changed frequency from BID to daily [how patient said he takes it]) Notes: Verified Allergies Patient states he stopped taking ASA because he stopped having heart symptoms Thank you for allowing me to participate in the care of your patient! Lesvia Davison, PharmD Candidate [id number]

## 2019-03-25 NOTE — ED PROVIDER NOTES
HPI  
 
  38y M with hx of DM, HTN, CAD here with R sided chest pain and R arm/leg numbness/weakness. Went to sleep around 10:30p and felt normal. Woke up at 3:15a due to chest pain. Noted at that time that his R arm felt numb and weak. Sx's ongoing this AM. Some nausea. No vomiting. No shortness of breath. No recent illnesses. No rash. No speech problems. No gait problems. No AMS per family. Pain in chest radiates to the R shoulder. Does NOT feel like prior cardiac chest pain. Nothing makes sx's better or worse. Past Medical History:  
Diagnosis Date  Asthma  Bronchitis  Chronic pain  Depression  Diabetes ([facility] [address] type 2  
 Gastrointestinal disorder GERD  Hypertension  MI (myocardial infarction) ([facility] [address]  Otitis media  Sinus infection  Vertigo 3/4/2013 No past surgical history on file. Family History:  
Problem Relation Age of Onset  Kidney Disease Mother  Hypertension Mother  Diabetes Mother  Cancer Mother   
     uterine  Elevated Lipids Mother  Coronary Artery Disease Father   
     premature  Hypertension Father  Diabetes Father  High Cholesterol Father  Cancer Father   
     skin  Elevated Lipids Father  Heart Disease Father  Cancer Sister   
     uterine  Hypertension Brother  High Cholesterol Brother  Coronary Artery Disease Paternal Uncle  Coronary Artery Disease Paternal Grandmother  Coronary Artery Disease Paternal Grandfather Social History Socioeconomic History  Marital status:  Spouse name: Not on file  Number of children: Not on file  Years of education: Not on file  Highest education level: Not on file Occupational History  Not on file Social Needs  Financial resource strain: Not on file  Food insecurity:  
  Worry: Not on file Inability: Not on file  Transportation needs:  
  Medical: Not on file Non-medical: Not on file Tobacco Use  Smoking status: Former Smoker  Smokeless tobacco: Never Used  Tobacco comment: quit over 10 years Substance and Sexual Activity  Alcohol use: Yes Comment: rare  Drug use: No  
 Sexual activity: Yes  
  Partners: Female Lifestyle  Physical activity:  
  Days per week: Not on file Minutes per session: Not on file  Stress: Not on file Relationships  Social connections:  
  Talks on phone: Not on file Gets together: Not on file Attends Orthodox service: Not on file Active member of club or organization: Not on file Attends meetings of clubs or organizations: Not on file Relationship status: Not on file  Intimate partner violence:  
  Fear of current or ex partner: Not on file Emotionally abused: Not on file Physically abused: Not on file Forced sexual activity: Not on file Other Topics Concern  Not on file Social History Narrative  Not on file ALLERGIES: Ativan [lorazepam] and Avelox [moxifloxacin] Review of Systems Review of Systems Constitutional: (-) weight loss. HEENT: (-) stiff neck Eyes: (-) discharge. Respiratory: (-) cough. Cardiovascular: (-) syncope. Gastrointestinal: (-) blood in stool. Genitourinary: (-) hematuria. Musculoskeletal: (-) myalgias. Neurological: (-) seizure. Skin: (-) petechiae Lymph/Immunologic: (-) enlarged lymph nodes All other systems reviewed and are negative. Vitals:  
 03/25/19 7827 03/25/19 0831 BP:  142/83 Pulse: 90 81 Resp:  16 Temp:  98.5 °F (36.9 °C) SpO2: 98% 100% Weight:  127.7 kg (281 lb 8.4 oz) Height:  5' 8\" (1.727 m) Physical Exam Nursing note and vitals reviewed. Constitutional: oriented to person, place, and time. appears well-developed and well-nourished. No distress. Head: Normocephalic and atraumatic. Sclera anicteric Nose: No rhinorrhea Mouth/Throat: Oropharynx is clear and moist. Pharynx normal 
 Eyes: Conjunctivae are normal. Pupils are equal, round, and reactive to light. Right eye exhibits no discharge. Left eye exhibits no discharge. Neck: Painless normal range of motion. Neck supple. No LAD. Cardiovascular: Normal rate, regular rhythm, normal heart sounds and intact distal pulses. Exam reveals no gallop and no friction rub. No murmur heard. Pulmonary/Chest:  No respiratory distress. No wheezes. No rales. No rhonchi. No increased work of breathing. No accessory muscle use. Good air exchange throughout. Abdominal: soft, non-tender, no rebound or guarding. No hepatosplenomegaly. Normal bowel sounds throughout. Back: no tenderness to palpation, no deformities, no CVA tenderness Extremities/Musculoskeletal: Normal range of motion. no tenderness. No edema. Distal extremities are neurovasc intact. Lymphadenopathy:   No adenopathy. Neurological:  CN II-XII intact, 4/5 strength in RUE and RLE, decreased sensation to light touch in RUE and RLE, nl cerebellar function, nl speech/fluency, nl gait/station, no pronator drift. Normal mental status. Skin: Skin is warm and dry. No rash noted. No pallor. MDM 38y M here with chest pain and RUE and RLE weakness/numbness. Outside of tpa window but needs CT/CTA and admission. Will also check labs, ECG, CXR. Procedures ED EKG interpretation: 
Rhythm: normal sinus rhythm; and regular . Rate (approx.): 81; Axis: normal; P wave: normal; QRS interval: normal ; ST/T wave: normal;  This EKG was interpreted by Ean Aguayo MD,ED Provider. Hospitalist Sd for Admission 9:42 AM 
 
ED Room Number: DZ83/81 Patient Name and age:  Tip Macias 41 y.o.  male Working Diagnosis: 1. Chest pain, unspecified type 2. Stroke-like symptoms 3. Right arm weakness 4. Right leg weakness 5. Right arm numbness 6. Right leg numbness Readmission: no 
Isolation Requirements:  no 
Recommended Level of Care:  telemetry Code Status:  full Other:  38y M with hx of 2 prior MI here with chest pain and R arm/leg numbness and weakness. Went to sleep at 10:30p and felt ok. Chest pain woke him at 3:15a and he had neuro sx's at that time. Workup in the ED is unremarkable. Needs CVA workup.

## 2019-03-26 VITALS
DIASTOLIC BLOOD PRESSURE: 60 MMHG | RESPIRATION RATE: 18 BRPM | OXYGEN SATURATION: 100 % | WEIGHT: 282 LBS | BODY MASS INDEX: 42.74 KG/M2 | SYSTOLIC BLOOD PRESSURE: 119 MMHG | HEIGHT: 68 IN | HEART RATE: 65 BPM | TEMPERATURE: 98.2 F

## 2019-03-26 LAB
CHOLEST SERPL-MCNC: 145 MG/DL
ERYTHROCYTE [DISTWIDTH] IN BLOOD BY AUTOMATED COUNT: 12.5 % (ref 11.5–14.5)
EST. AVERAGE GLUCOSE BLD GHB EST-MCNC: 114 MG/DL
GLUCOSE BLD STRIP.AUTO-MCNC: 108 MG/DL (ref 65–100)
GLUCOSE BLD STRIP.AUTO-MCNC: 98 MG/DL (ref 65–100)
HBA1C MFR BLD: 5.6 % (ref 4.2–6.3)
HCT VFR BLD AUTO: 43.7 % (ref 36.6–50.3)
HDLC SERPL-MCNC: 35 MG/DL
HDLC SERPL: 4.1 {RATIO} (ref 0–5)
HGB BLD-MCNC: 14.4 G/DL (ref 12.1–17)
LDLC SERPL CALC-MCNC: 90.8 MG/DL (ref 0–100)
LIPID PROFILE,FLP: NORMAL
MCH RBC QN AUTO: 29.4 PG (ref 26–34)
MCHC RBC AUTO-ENTMCNC: 33 G/DL (ref 30–36.5)
MCV RBC AUTO: 89.2 FL (ref 80–99)
NRBC # BLD: 0 K/UL (ref 0–0.01)
NRBC BLD-RTO: 0 PER 100 WBC
PLATELET # BLD AUTO: 183 K/UL (ref 150–400)
PMV BLD AUTO: 9.7 FL (ref 8.9–12.9)
RBC # BLD AUTO: 4.9 M/UL (ref 4.1–5.7)
SERVICE CMNT-IMP: ABNORMAL
SERVICE CMNT-IMP: NORMAL
TRIGL SERPL-MCNC: 96 MG/DL (ref ?–150)
VLDLC SERPL CALC-MCNC: 19.2 MG/DL
WBC # BLD AUTO: 6.4 K/UL (ref 4.1–11.1)

## 2019-03-26 PROCEDURE — 74011250637 HC RX REV CODE- 250/637: Performed by: FAMILY MEDICINE

## 2019-03-26 PROCEDURE — 82962 GLUCOSE BLOOD TEST: CPT

## 2019-03-26 PROCEDURE — 36415 COLL VENOUS BLD VENIPUNCTURE: CPT

## 2019-03-26 PROCEDURE — 74011250636 HC RX REV CODE- 250/636: Performed by: FAMILY MEDICINE

## 2019-03-26 PROCEDURE — 80061 LIPID PANEL: CPT

## 2019-03-26 PROCEDURE — 83036 HEMOGLOBIN GLYCOSYLATED A1C: CPT

## 2019-03-26 PROCEDURE — 99218 HC RM OBSERVATION: CPT

## 2019-03-26 PROCEDURE — 96372 THER/PROPH/DIAG INJ SC/IM: CPT

## 2019-03-26 PROCEDURE — 85027 COMPLETE CBC AUTOMATED: CPT

## 2019-03-26 RX ORDER — PRAVASTATIN SODIUM 40 MG/1
40 TABLET ORAL
Qty: 30 TAB | Refills: 0 | Status: SHIPPED | OUTPATIENT
Start: 2019-03-26 | End: 2019-07-08

## 2019-03-26 RX ORDER — GUAIFENESIN 100 MG/5ML
81 LIQUID (ML) ORAL DAILY
Qty: 30 TAB | Refills: 0 | Status: SHIPPED | OUTPATIENT
Start: 2019-03-27 | End: 2019-12-04

## 2019-03-26 RX ADMIN — FAMOTIDINE 20 MG: 20 TABLET ORAL at 08:18

## 2019-03-26 RX ADMIN — AMLODIPINE BESYLATE 5 MG: 5 TABLET ORAL at 08:18

## 2019-03-26 RX ADMIN — METOPROLOL TARTRATE 12.5 MG: 25 TABLET ORAL at 08:18

## 2019-03-26 RX ADMIN — ASPIRIN 81 MG CHEWABLE TABLET 81 MG: 81 TABLET CHEWABLE at 08:18

## 2019-03-26 RX ADMIN — Medication 10 ML: at 07:12

## 2019-03-26 RX ADMIN — BUSPIRONE HYDROCHLORIDE 5 MG: 5 TABLET ORAL at 08:18

## 2019-03-26 RX ADMIN — HEPARIN SODIUM 5000 UNITS: 5000 INJECTION, SOLUTION INTRAVENOUS; SUBCUTANEOUS at 08:17

## 2019-03-26 NOTE — PROGRESS NOTES
Bedside and Verbal shift change report given to [address] (oncoming nurse) by [facility] (offgoing nurse). Report included the following information SBAR, Kardex and Recent Results.

## 2019-03-26 NOTE — DISCHARGE INSTRUCTIONS
Discharge Instructions       PATIENT ID: Kinza Brantley  MRN: [id number]   YOB: 1975    DATE OF ADMISSION: 3/25/2019  8:22 AM    DATE OF DISCHARGE: 3/26/2019    PRIMARY CARE PROVIDER: Werner Araiza MD     ATTENDING PHYSICIAN: Yahir Rosenthal MD  DISCHARGING PROVIDER: Dee Mcneil MD    To contact this individual call [phone] and ask the  to page. If unavailable ask to be transferred the Adult Hospitalist Department. DISCHARGE DIAGNOSES   Right arm numbness and weakness  Right side chest pain  T2DM  HTN    CONSULTATIONS: IP CONSULT TO NEUROLOGY    PROCEDURES/SURGERIES: * No surgery found *    PENDING TEST RESULTS:   At the time of discharge the following test results are still pending: none    FOLLOW UP APPOINTMENTS:   Follow-up Information     Follow up With Specialties Details Why Contact Info   1. Werner Araiza MD [facility] In one week  [address] [address]  [phone]       2. García Glass MD, Neurointerventional surgeon in 2-3 weeks    ADDITIONAL CARE RECOMMENDATIONS:     DIET: Diabetic Diet    ACTIVITY: Activity as tolerated    WOUND CARE: None    EQUIPMENT needed: None      DISCHARGE MEDICATIONS:   See Medication Reconciliation Form    · It is important that you take the medication exactly as they are prescribed. · Keep your medication in the bottles provided by the pharmacist and keep a list of the medication names, dosages, and times to be taken in your wallet. · Do not take other medications without consulting your doctor. NOTIFY YOUR PHYSICIAN FOR ANY OF THE FOLLOWING:   Fever over 101 degrees for 24 hours. Chest pain, shortness of breath, fever, chills, nausea, vomiting, diarrhea, change in mentation, falling, weakness, bleeding. Severe pain or pain not relieved by medications. Or, any other signs or symptoms that you may have questions about.       DISPOSITION:    Home With:   OT  PT  [facility]  RN SNF/Inpatient Rehab/LTAC   x Independent/assisted living    Hospice    Other:     CDMP Checked:   Yes x     PROBLEM LIST Updated:  Yes x       Signed:   Juana Vazquez MD  3/26/2019  12:25 PM

## 2019-03-26 NOTE — PROGRESS NOTES
Hospitalist Progress Note Dee Mcneil MD 
Answering service: [phone] OR [phone] from in house phone Cell: [phone] Date of Service:  3/26/2019 NAME:  Kinza Brantley :  1975 MRN:  [id number] Admission Summary:  
The patient is a 70-year-old gentleman with past medical history of recurrent depression; diabetes mellitus, type 2; hearing loss; vertigo; pulsatile tinnitus; anxiety; and GERD, presents to the hospital with chest pain and numbness. Interval history / Subjective: He said he feels better Assessment & Plan:  
 
Right arm/leg numbness and weakness unclear etiology 
-on aspirin and statin 
-CT head No acute intracranial abnormality. Nonspecific white matter disease unchanged. -MRI no evidence of acute intracranial abnormality, unchanged generalized parenchymal volume loss and moderate chronic microvascular ischemic disease, both of which are advanced for patient age. -MRI of cervical spine mild spinal stenosis. Mild bilateral neural foraminal narrowing at C4-5 and C5-6. Moderate right neural foraminal narrowing at C3-4. 
-lipid panel normal 
-EKG normal sinus, no significant change compared to previous EKG 
-Seen by neurologist 
 
Atypical chest pain 
-troponin <0.05, ekg no significant change Infundibular origin of the left posterior communicating artery versus 1 mm 
inferiorly projecting aneurysm at the ICA terminus. -seen by NIS and recommend outpatient follow up HTN 
-continue norvasc and metoprolol, monitor BP 
 
T2DM 
-A1c 5.6 
-continue home regimen Hx of CAD 
-continue aspirin, statin and metoprolol Code status: Full Code DVT prophylaxis: SCD Care Plan discussed with: Patient/Family, Nurse and  Disposition: Home w/Family Hospital Problems  Date Reviewed: 3/25/2019 Codes Class Noted POA * (Principal) TIA (transient ischemic attack) ICD-10-CM: G45.9 ICD-9-CM: 435.9  3/25/2019 Unknown Vital Signs:  
 Last 24hrs VS reviewed since prior progress note. Most recent are: 
Visit Vitals /60 (BP 1 Location: Right arm, BP Patient Position: At rest) Pulse 65 Temp 98.2 °F (36.8 °C) Resp 18 Ht 5' 8\" (1.727 m) Wt 127.9 kg (282 lb) SpO2 100% BMI 42.88 kg/m² No intake or output data in the 24 hours ending 03/26/19 1211 Physical Examination:  
 
 
     
Constitutional:  No acute distress, cooperative, pleasant   
ENT:  Oral mucous moist, oropharynx benign. Neck supple, Resp:  CTA bilaterally. No wheezing/rhonchi/rales. No accessory muscle use CV:  Regular rhythm, normal rate, no murmurs, gallops, rubs GI:  Soft, non distended, non tender. normoactive bowel sounds, no hepatosplenomegaly Musculoskeletal:  No edema Neurologic:  Moves all extremities. AAOx3, CN II-XII reviewed Skin:  Good turgor, no rashes or ulcers Data Review:  
 Review and/or order of clinical lab test 
 
 
Labs:  
 
Recent Labs  
  03/26/19 
0241 03/25/19 
0835 WBC 6.4 7.6 HGB 14.4 15.4 HCT 43.7 46.3  188 Recent Labs  
  03/25/19 
0157   
K 4.0  
* CO2 25 BUN 24* CREA 1.12  
* CA 8.1* Recent Labs  
  03/25/19 
8608 SGOT 13* ALT 31  
 TBILI 0.5 TP 7.1 ALB 3.7 GLOB 3.4 No results for input(s): INR, PTP, APTT in the last 72 hours. No lab exists for component: INREXT No results for input(s): FE, TIBC, PSAT, FERR in the last 72 hours. No results found for: FOL, RBCF No results for input(s): PH, PCO2, PO2 in the last 72 hours. Recent Labs  
  03/25/19 
1554 03/25/19 
3548 TROIQ <0.05 <0.05 Lab Results Component Value Date/Time  Cholesterol, total 145 03/26/2019 02:41 AM  
 HDL Cholesterol 35 03/26/2019 02:41 AM  
 LDL, calculated 90.8 03/26/2019 02:41 AM  
 Triglyceride 96 03/26/2019 02:41 AM  
 CHOL/HDL Ratio 4.1 03/26/2019 02:41 AM  
 
Lab Results Component Value Date/Time Glucose (POC) 98 03/26/2019 11:35 AM  
 Glucose (POC) 108 (H) 03/26/2019 06:38 AM  
 Glucose (POC) 120 (H) 03/25/2019 09:46 PM  
 Glucose (POC) 115 (H) 03/25/2019 04:50 PM  
 Glucose (POC) 131 (H) 03/25/2019 08:30 AM  
 
Lab Results Component Value Date/Time Color YELLOW/STRAW 03/25/2019 11:00 AM  
 Appearance CLEAR 03/25/2019 11:00 AM  
 Specific gravity >1.030 (H) 03/25/2019 11:00 AM  
 pH (UA) 6.5 03/25/2019 11:00 AM  
 Protein NEGATIVE  03/25/2019 11:00 AM  
 Glucose NEGATIVE  03/25/2019 11:00 AM  
 Ketone NEGATIVE  03/25/2019 11:00 AM  
 Bilirubin NEGATIVE  03/25/2019 11:00 AM  
 Urobilinogen 0.2 03/25/2019 11:00 AM  
 Nitrites NEGATIVE  03/25/2019 11:00 AM  
 Leukocyte Esterase NEGATIVE  03/25/2019 11:00 AM  
 Epithelial cells FEW 03/25/2019 11:00 AM  
 Bacteria NEGATIVE  03/25/2019 11:00 AM  
 WBC 0-4 03/25/2019 11:00 AM  
 RBC 0-5 03/25/2019 11:00 AM  
 
 
 
Medications Reviewed:  
 
Current Facility-Administered Medications Medication Dose Route Frequency  amLODIPine (NORVASC) tablet 5 mg  5 mg Oral DAILY  busPIRone (BUSPAR) tablet 5 mg  5 mg Oral DAILY  sodium chloride (NS) flush 5-40 mL  5-40 mL IntraVENous Q8H  
 sodium chloride (NS) flush 5-40 mL  5-40 mL IntraVENous PRN  
 acetaminophen (TYLENOL) tablet 650 mg  650 mg Oral Q4H PRN Or  
 acetaminophen (TYLENOL) solution 650 mg  650 mg Per NG tube Q4H PRN Or  
 acetaminophen (TYLENOL) suppository 650 mg  650 mg Rectal Q4H PRN  
 0.9% sodium chloride infusion  75 mL/hr IntraVENous CONTINUOUS  
 ondansetron (ZOFRAN) injection 4 mg  4 mg IntraVENous Q6H PRN  
 aspirin chewable tablet 81 mg  81 mg Oral DAILY  pravastatin (PRAVACHOL) tablet 40 mg  40 mg Oral QHS  famotidine (PEPCID) tablet 20 mg  20 mg Oral Q12H  
 heparin (porcine) injection 5,000 Units  5,000 Units SubCUTAneous Q8H  
  metoprolol tartrate (LOPRESSOR) tablet 12.5 mg  12.5 mg Oral Q12H  
 glucose chewable tablet 16 g  4 Tab Oral PRN  
 dextrose (D50W) injection syrg 12.5-25 g  25-50 mL IntraVENous PRN  
 glucagon (GLUCAGEN) injection 1 mg  1 mg IntraMUSCular PRN  
 insulin lispro (HUMALOG) injection   SubCUTAneous AC&HS  
 
______________________________________________________________________ EXPECTED LENGTH OF STAY: - - - 
ACTUAL LENGTH OF STAY:          0 Harman Sams MD

## 2019-03-26 NOTE — DISCHARGE SUMMARY
Discharge Summary       PATIENT ID: Louis Desai  MRN: [id number]   YOB: 1975    DATE OF ADMISSION: 3/25/2019  8:22 AM    DATE OF DISCHARGE: 3/26/2019   PRIMARY CARE PROVIDER: Najma Sánchez MD     ATTENDING PHYSICIAN: Julio Cesar Mead MD  DISCHARGING PROVIDER: Noris Gonzalez MD    To contact this individual call [phone] and ask the  to page. If unavailable ask to be transferred the Adult Hospitalist Department. CONSULTATIONS: IP CONSULT TO NEUROLOGY    PROCEDURES/SURGERIES: * No surgery found *    ADMITTING [address] [place] [address] COURSE:   The patient is a 70-year-old gentleman with past medical history of recurrent depression; diabetes mellitus, type 2; hearing loss; vertigo; pulsatile tinnitus; anxiety; and GERD, presents to the hospital with chest pain and numbness  Right arm/leg numbness and weakness unclear etiology  -on aspirin and statin  -CT head No acute intracranial abnormality. Nonspecific white matter disease unchanged. -MRI no evidence of acute intracranial abnormality, unchanged generalized parenchymal volume loss and moderate chronic microvascular ischemic disease, both of which are advanced for patient age. -MRI of cervical spine mild spinal stenosis. Mild bilateral neural foraminal narrowing at C4-5 and C5-6. Moderate right neural foraminal narrowing at C3-4.  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 -outpatient follow up with [facility]  HTN  -continue norvasc and metoprolol, monitor BP  T2DM  -continue home regimen  Hx of CAD  -continue aspirin, statin and metoprolol    PENDING TEST RESULTS:   At the time of discharge the following test results are still pending:None    FOLLOW UP APPOINTMENTS:    Follow-up Information     Follow up With Specialties Details Why Contact Info    Suzie Hernandez MD [facility] In one week  [address] [facility] [address] [phone]           DIET: Diabetic Diet    ACTIVITY: Activity as tolerated    WOUND CARE: None    EQUIPMENT needed: None    DISCHARGE MEDICATIONS:  Current Discharge Medication List      START taking these medications    Details   aspirin 81 mg chewable tablet Take 1 Tab by mouth daily. Qty: 30 Tab, Refills: 0      pravastatin (PRAVACHOL) 40 mg tablet Take 1 Tab by mouth nightly. Qty: 30 Tab, Refills: 0         CONTINUE these medications which have NOT CHANGED    Details   busPIRone (BUSPAR) 5 mg tablet Take 5 mg by mouth daily. amLODIPine (NORVASC) 5 mg tablet TAKE 1 TABLET BY MOUTH EVERY DAY  Qty: 30 Tab, Refills: 4      topiramate (TOPAMAX) 25 mg tablet TAKE 1 TABLET BY MOUTH EVERY DAY WITH DINNER  Qty: 90 Tab, Refills: 3    Associated Diagnoses: Obesity, morbid, BMI 40.0-49.9 ([facility])      metFORMIN (GLUCOPHAGE) 500 mg tablet TAKE 1 TABLET BY MOUTH TWICE PER DAY WITH MEALS  Qty: 180 Tab, Refills: 1    Associated Diagnoses: Type 2 diabetes mellitus with hyperglycemia, without long-term current use of insulin ([facility])               NOTIFY YOUR PHYSICIAN FOR ANY OF THE FOLLOWING:   Fever over 101 degrees for 24 hours.    Chest pain, shortness of breath, fever, chills, nausea, vomiting, diarrhea, change in mentation, falling, weakness, bleeding. Severe pain or pain not relieved by medications. Or, any other signs or symptoms that you may have questions about. DISPOSITION:    Home With:   OT  PT  HH  RN       Long term SNF/Inpatient Rehab   x Independent/assisted living    Hospice    Other:       PATIENT CONDITION AT DISCHARGE:     Functional status    Poor     Deconditioned    x Independent      Cognition    x Lucid     Forgetful     Dementia      Catheters/lines (plus indication)    Bear     PICC     PEG    x None      Code status    x Full code     DNR      PHYSICAL EXAMINATION AT DISCHARGE:   Refer to Progress Note      CHRONIC MEDICAL DIAGNOSES:  Problem List as of 3/26/2019 Date Reviewed: 3/25/2019          Codes Class Noted - Resolved    * (Principal) TIA (transient ischemic attack) ICD-10-CM: G45.9  ICD-9-CM: 435.9  3/25/2019 - Present        Recurrent depression ([email or web address] ICD-10-CM: F33.9  ICD-9-CM: 296.30  1/5/2018 - Present        Controlled type 2 diabetes mellitus without complication, without long-term current use of insulin ([facility] [email or web address] 75.) ICD-10-CM: E11.9  ICD-9-CM: 250.00  2/15/2017 - Present        Hearing loss ICD-10-CM: H91.90  ICD-9-CM: 389.9  3/4/2013 - Present        Vertigo ICD-10-CM: R42  ICD-9-CM: 780.4  3/4/2013 - Present        Pulsatile tinnitus ICD-10-CM: H93. A9  ICD-9-CM: 388.30  3/4/2013 - Present        Anxiety ICD-10-CM: F41.9  ICD-9-CM: 300.00  12/2/2009 - Present        GERD (gastroesophageal reflux disease) ICD-10-CM: K21.9  ICD-9-CM: 530.81  8/31/2009 - Present              Greater than 45 minutes were spent with the patient on counseling and coordination of care    Signed:   Clayton Falk MD  3/26/2019  12:31 PM

## 2019-03-26 NOTE — PROGRESS NOTES
Hospital follow-up PCP transitional care appointment has been scheduled with Mimi Shafer NP for Monday, 4/1/19 at 1:00 p.m. Pending patient discharge.   Samira Javed, Care Management Specialist.

## 2019-03-26 NOTE — PROGRESS NOTES
Problem: Pain - Acute Goal: *Control of acute pain Outcome: Progressing Towards Goal 
  
Problem: Pressure Injury - Risk of 
Goal: *Prevention of pressure injury Description Document Ruslan Scale and appropriate interventions in the flowsheet. Outcome: Progressing Towards Goal 
  
Problem: Falls - Risk of 
Goal: *Absence of Falls Description Document Walt Boaurelio Fall Risk and appropriate interventions in the flowsheet. Outcome: Progressing Towards Goal 
  
Problem: TIA/CVA Stroke: 0-24 hours Goal: Activity/Safety Outcome: Progressing Towards Goal 
Goal: Diagnostic Test/Procedures Outcome: Progressing Towards Goal 
Goal: Nutrition/Diet Outcome: Progressing Towards Goal 
Goal: Discharge Planning Outcome: Progressing Towards Goal 
  
Problem: TIA/CVA Stroke: Day 2 Until Discharge Goal: Activity/Safety Outcome: Progressing Towards Goal 
Goal: Diagnostic Test/Procedures Outcome: Progressing Towards Goal 
Goal: Nutrition/Diet Outcome: Progressing Towards Goal 
Goal: Discharge Planning Outcome: Progressing Towards Goal 
  
Problem: Ischemic Stroke: Discharge Outcomes Goal: *Verbalizes anxiety and depression are reduced or absent Outcome: Progressing Towards Goal 
Goal: *Hemodynamically stable Outcome: Progressing Towards Goal 
Goal: *Tolerating diet Outcome: Progressing Towards Goal 
Goal: *Aware of follow-up diagnostics related to anticoagulants Outcome: Progressing Towards Goal

## 2019-03-26 NOTE — ANCILLARY DISCHARGE INSTRUCTIONS
[address]  
 
 
3/26/2019 RE: Cindy Martinez To Whom it May Concern: This is to certify that Cindy Martinez was admitted to hospital on 3/25/2019 
 to 3/26/35987. He may return to work on 3/27/2019. Thank you for your assistance in this matter. Sincerely, Wendy Mccloud MD  
Adult Hospitalist 
[address] [facility] Physician [facility] 
[phone]

## 2019-03-26 NOTE — PROGRESS NOTES
Problem: Pain - Acute Goal: *Control of acute pain Outcome: Progressing Towards Goal 
  
Problem: Patient Education: Go to Patient Education Activity Goal: Patient/Family Education Outcome: Progressing Towards Goal 
  
Problem: Pressure Injury - Risk of 
Goal: *Prevention of pressure injury Description Document Ruslan Scale and appropriate interventions in the flowsheet. Outcome: Progressing Towards Goal 
  
Problem: Patient Education: Go to Patient Education Activity Goal: Patient/Family Education Outcome: Progressing Towards Goal 
  
Problem: Falls - Risk of 
Goal: *Absence of Falls Description Document Michelle Stark Fall Risk and appropriate interventions in the flowsheet. Outcome: Progressing Towards Goal 
  
Problem: Patient Education: Go to Patient Education Activity Goal: Patient/Family Education Outcome: Progressing Towards Goal 
  
Problem: Patient Education: Go to Patient Education Activity Goal: Patient/Family Education Outcome: Progressing Towards Goal 
  
Problem: TIA/CVA Stroke: 0-24 hours Goal: Off Pathway (Use only if patient is Off Pathway) Outcome: Progressing Towards Goal 
Goal: Activity/Safety Outcome: Progressing Towards Goal 
Goal: Consults, if ordered Outcome: Progressing Towards Goal 
Goal: Diagnostic Test/Procedures Outcome: Progressing Towards Goal 
Goal: Nutrition/Diet Outcome: Progressing Towards Goal 
Goal: Discharge Planning Outcome: Progressing Towards Goal 
Goal: Medications Outcome: Progressing Towards Goal 
Goal: Respiratory Outcome: Progressing Towards Goal 
Goal: Treatments/Interventions/Procedures Outcome: Progressing Towards Goal 
Goal: Minimize risk of bleeding post-thrombolytic infusion Outcome: Progressing Towards Goal 
Goal: Monitor for complications post-thrombolytic infusion Outcome: Progressing Towards Goal 
Goal: Psychosocial 
Outcome: Progressing Towards Goal 
Goal: *Hemodynamically stable Outcome: Progressing Towards Goal 
 Goal: *Neurologically stable Description Absence of additional neurological deficits Outcome: Progressing Towards Goal 
Goal: *Verbalizes anxiety and depression are reduced or absent Outcome: Progressing Towards Goal 
Goal: *Absence of Signs of Aspiration on Current Diet Outcome: Progressing Towards Goal 
Goal: *Absence of deep venous thrombosis signs and symptoms(Stroke Metric) Outcome: Progressing Towards Goal 
Goal: *Ability to perform ADLs and demonstrates progressive mobility and function Outcome: Progressing Towards Goal 
Goal: *Stroke education started(Stroke Metric) Outcome: Progressing Towards Goal 
Goal: *Dysphagia screen performed(Stroke Metric) Outcome: Progressing Towards Goal 
Goal: *Rehab consulted(Stroke Metric) Outcome: Progressing Towards Goal 
  
Problem: TIA/CVA Stroke: Day 2 Until Discharge Goal: Off Pathway (Use only if patient is Off Pathway) Outcome: Progressing Towards Goal 
Goal: Activity/Safety Outcome: Progressing Towards Goal 
Goal: Diagnostic Test/Procedures Outcome: Progressing Towards Goal 
Goal: Nutrition/Diet Outcome: Progressing Towards Goal 
Goal: Discharge Planning Outcome: Progressing Towards Goal 
Goal: Medications Outcome: Progressing Towards Goal 
Goal: Respiratory Outcome: Progressing Towards Goal 
Goal: Treatments/Interventions/Procedures Outcome: Progressing Towards Goal 
Goal: Psychosocial 
Outcome: Progressing Towards Goal 
Goal: *Verbalizes anxiety and depression are reduced or absent Outcome: Progressing Towards Goal 
Goal: *Absence of aspiration Outcome: Progressing Towards Goal 
Goal: *Absence of deep venous thrombosis signs and symptoms(Stroke Metric) Outcome: Progressing Towards Goal 
Goal: *Optimal pain control at patient's stated goal 
Outcome: Progressing Towards Goal 
Goal: *Tolerating diet Outcome: Progressing Towards Goal 
Goal: *Ability to perform ADLs and demonstrates progressive mobility and function Outcome: Progressing Towards Goal 
Goal: *Stroke education continued(Stroke Metric) Outcome: Progressing Towards Goal 
  
Problem: Ischemic Stroke: Discharge Outcomes Goal: *Verbalizes anxiety and depression are reduced or absent Outcome: Progressing Towards Goal 
Goal: *Verbalize understanding of risk factor modification(Stroke Metric) Outcome: Progressing Towards Goal 
Goal: *Hemodynamically stable Outcome: Progressing Towards Goal 
Goal: *Absence of aspiration pneumonia Outcome: Progressing Towards Goal 
Goal: *Aware of needed dietary changes Outcome: Progressing Towards Goal 
Goal: *Verbalize understanding of prescribed medications including anti-coagulants, anti-lipid, and/or anti-platelets(Stroke Metric) Outcome: Progressing Towards Goal 
Goal: *Tolerating diet Outcome: Progressing Towards Goal 
Goal: *Aware of follow-up diagnostics related to anticoagulants Outcome: Progressing Towards Goal 
Goal: *Ability to perform ADLs and demonstrates progressive mobility and function Outcome: Progressing Towards Goal 
Goal: *Absence of DVT(Stroke Metric) Outcome: Progressing Towards Goal 
Goal: *Absence of aspiration Outcome: Progressing Towards Goal 
Goal: *Optimal pain control at patient's stated goal 
Outcome: Progressing Towards Goal 
Goal: *Home safety concerns addressed Outcome: Progressing Towards Goal 
Goal: *Describes available resources and support systems Outcome: Progressing Towards Goal 
Goal: *Verbalizes understanding of activation of EMS(911) for stroke symptoms(Stroke Metric) Outcome: Progressing Towards Goal 
Goal: *Understands and describes signs and symptoms to report to providers(Stroke Metric) Outcome: Progressing Towards Goal 
Goal: *Neurolgocially stable (absence of additional neurological deficits) Outcome: Progressing Towards Goal 
Goal: *Verbalizes importance of follow-up with primary care physician(Stroke Metric) Outcome: Progressing Towards Goal 
 Goal: *Smoking cessation discussed,if applicable(Stroke Metric) Outcome: Progressing Towards Goal 
Goal: *Depression screening completed(Stroke Metric) Outcome: Progressing Towards Goal

## 2019-04-16 ENCOUNTER — APPOINTMENT (OUTPATIENT)
Dept: CT IMAGING | Age: 44
End: 2019-04-16
Attending: EMERGENCY MEDICINE
Payer: MEDICAID

## 2019-04-16 ENCOUNTER — HOSPITAL ENCOUNTER (EMERGENCY)
Age: 44
Discharge: HOME | End: 2019-04-16
Attending: EMERGENCY MEDICINE | Admitting: EMERGENCY MEDICINE
Payer: MEDICAID

## 2019-04-16 VITALS
HEIGHT: 68 IN | RESPIRATION RATE: 18 BRPM | BODY MASS INDEX: 43.5 KG/M2 | OXYGEN SATURATION: 94 % | SYSTOLIC BLOOD PRESSURE: 113 MMHG | DIASTOLIC BLOOD PRESSURE: 64 MMHG | TEMPERATURE: 97.7 F | HEART RATE: 76 BPM | WEIGHT: 287.04 LBS

## 2019-04-16 LAB
ALBUMIN SERPL-MCNC: 3.5 G/DL (ref 3.5–5)
ALBUMIN/GLOB SERPL: 1.1 {RATIO} (ref 1.1–2.2)
ALP SERPL-CCNC: 100 U/L (ref 45–117)
ALT SERPL-CCNC: 33 U/L (ref 12–78)
ANION GAP SERPL CALC-SCNC: 12 MMOL/L (ref 5–15)
AST SERPL-CCNC: 21 U/L (ref 15–37)
ATRIAL RATE: 72 BPM
BASOPHILS # BLD: 0.1 K/UL (ref 0–0.1)
BASOPHILS NFR BLD: 1 % (ref 0–1)
BILIRUB SERPL-MCNC: 0.6 MG/DL (ref 0.2–1)
BUN SERPL-MCNC: 16 MG/DL (ref 6–20)
BUN/CREAT SERPL: 15 (ref 12–20)
CALCIUM SERPL-MCNC: 8.5 MG/DL (ref 8.5–10.1)
CALCULATED P AXIS, ECG09: -4 DEGREES
CALCULATED R AXIS, ECG10: -16 DEGREES
CHLORIDE SERPL-SCNC: 104 MMOL/L (ref 97–108)
CO2 SERPL-SCNC: 25 MMOL/L (ref 21–32)
COMMENT, HOLDF: NORMAL
CREAT SERPL-MCNC: 1.07 MG/DL (ref 0.7–1.3)
DIAGNOSIS, 93000: NORMAL
DIFFERENTIAL METHOD BLD: NORMAL
EOSINOPHIL # BLD: 0.2 K/UL (ref 0–0.4)
EOSINOPHIL NFR BLD: 3 % (ref 0–7)
ERYTHROCYTE [DISTWIDTH] IN BLOOD BY AUTOMATED COUNT: 12.6 % (ref 11.5–14.5)
GLOBULIN SER CALC-MCNC: 3.2 G/DL (ref 2–4)
GLUCOSE SERPL-MCNC: 116 MG/DL (ref 65–100)
HCT VFR BLD AUTO: 48.9 % (ref 36.6–50.3)
HGB BLD-MCNC: 16.4 G/DL (ref 12.1–17)
IMM GRANULOCYTES # BLD AUTO: 0 K/UL (ref 0–0.04)
IMM GRANULOCYTES NFR BLD AUTO: 0 % (ref 0–0.5)
INR PPP: 1 (ref 0.9–1.1)
LYMPHOCYTES # BLD: 1.5 K/UL (ref 0.8–3.5)
LYMPHOCYTES NFR BLD: 23 % (ref 12–49)
MCH RBC QN AUTO: 29.1 PG (ref 26–34)
MCHC RBC AUTO-ENTMCNC: 33.5 G/DL (ref 30–36.5)
MCV RBC AUTO: 86.7 FL (ref 80–99)
MONOCYTES # BLD: 0.4 K/UL (ref 0–1)
MONOCYTES NFR BLD: 6 % (ref 5–13)
NEUTS SEG # BLD: 4.3 K/UL (ref 1.8–8)
NEUTS SEG NFR BLD: 67 % (ref 32–75)
NRBC # BLD: 0 K/UL (ref 0–0.01)
NRBC BLD-RTO: 0 PER 100 WBC
P-R INTERVAL, ECG05: 134 MS
PLATELET # BLD AUTO: 206 K/UL (ref 150–400)
PMV BLD AUTO: 10.2 FL (ref 8.9–12.9)
POTASSIUM SERPL-SCNC: 3.7 MMOL/L (ref 3.5–5.1)
PROT SERPL-MCNC: 6.7 G/DL (ref 6.4–8.2)
PROTHROMBIN TIME: 9.8 SEC (ref 9–11.1)
Q-T INTERVAL, ECG07: 404 MS
QRS DURATION, ECG06: 94 MS
QTC CALCULATION (BEZET), ECG08: 442 MS
RBC # BLD AUTO: 5.64 M/UL (ref 4.1–5.7)
SAMPLES BEING HELD,HOLD: NORMAL
SODIUM SERPL-SCNC: 141 MMOL/L (ref 136–145)
VENTRICULAR RATE, ECG03: 72 BPM
WBC # BLD AUTO: 6.4 K/UL (ref 4.1–11.1)

## 2019-04-16 PROCEDURE — 85025 COMPLETE CBC W/AUTO DIFF WBC: CPT

## 2019-04-16 PROCEDURE — 36415 COLL VENOUS BLD VENIPUNCTURE: CPT

## 2019-04-16 PROCEDURE — 93005 ELECTROCARDIOGRAM TRACING: CPT

## 2019-04-16 PROCEDURE — 70450 CT HEAD/BRAIN W/O DYE: CPT

## 2019-04-16 PROCEDURE — 80053 COMPREHEN METABOLIC PANEL: CPT

## 2019-04-16 PROCEDURE — 99285 EMERGENCY DEPT VISIT HI MDM: CPT

## 2019-04-16 PROCEDURE — 74011250637 HC RX REV CODE- 250/637: Performed by: EMERGENCY MEDICINE

## 2019-04-16 PROCEDURE — 85610 PROTHROMBIN TIME: CPT

## 2019-04-16 RX ORDER — KETOROLAC TROMETHAMINE 10 MG/1
10 TABLET, FILM COATED ORAL
Qty: 20 TAB | Refills: 0 | Status: SHIPPED | OUTPATIENT
Start: 2019-04-16 | End: 2019-07-08

## 2019-04-16 RX ORDER — BUTALBITAL, ACETAMINOPHEN AND CAFFEINE 50; 325; 40 MG/1; MG/1; MG/1
1 TABLET ORAL
Status: COMPLETED | OUTPATIENT
Start: 2019-04-16 | End: 2019-04-16

## 2019-04-16 RX ADMIN — BUTALBITAL, ACETAMINOPHEN AND CAFFEINE 1 TABLET: 50; 325; 40 TABLET ORAL at 10:02

## 2019-04-16 NOTE — ED TRIAGE NOTES
TRIAGE NOTE: Patient arrived from home with c/o bilateral arm numbness that started Friday while working. Admitted to [facility] 2 weeks ago for \"silent strokes\" Denies pain.

## 2019-04-16 NOTE — ED PROVIDER NOTES
HPI  
 
Pt is a 41 y.o. M with PMH of DM, HTN, CAD, chronic HA presents to ED with c/o bilateral arm paresthesias and pain as well as generalized weakness and fatigue x 5 days. The pt says his paresthesias goes down his entire arm to his fingers bilaterally and is worse with lifting his arms. He has no injury. He was admitted a few weeks ago (3/25/19) and worked up for possible CVA 2/2 to presentation of numbness. He had code neuro imaging and then Brain MRI that did not show CVA. He had MRI of the neck that showed foraminal narrowing and spinal stenosis of C-spine area. He is scheduled to follow up with neurology in a few weeks. His headache is bandlike and similar to previous headaches. It is not the worst or any different today. No photophobia or neck pain or stiffness and no fever. He has been compliant with meds. No other complaint at this time. Past Medical History:  
Diagnosis Date  Asthma  Bronchitis  Chronic pain  Depression  Diabetes ([address] type 2  
 Gastrointestinal disorder GERD  Hypertension  MI (myocardial infarction) ([address]   
 x 2 per patient  Murmur   
 per patient  Otitis media  Sinus infection  Vertigo 3/4/2013 History reviewed. No pertinent surgical history. Family History:  
Problem Relation Age of Onset  Kidney Disease Mother  Hypertension Mother  Diabetes Mother  Cancer Mother   
     uterine  Elevated Lipids Mother  Coronary Artery Disease Father   
     premature  Hypertension Father  Diabetes Father  High Cholesterol Father  Cancer Father   
     skin  Elevated Lipids Father  Heart Disease Father  Cancer Sister   
     uterine  Hypertension Brother  High Cholesterol Brother  Coronary Artery Disease Paternal Uncle  Coronary Artery Disease Paternal Grandmother  Coronary Artery Disease Paternal Grandfather Social History Socioeconomic History  Marital status:  Spouse name: Not on file  Number of children: Not on file  Years of education: Not on file  Highest education level: Not on file Occupational History  Not on file Social Needs  Financial resource strain: Not on file  Food insecurity:  
  Worry: Not on file Inability: Not on file  Transportation needs:  
  Medical: Not on file Non-medical: Not on file Tobacco Use  Smoking status: Former Smoker  Smokeless tobacco: Never Used  Tobacco comment: quit over 10 years Substance and Sexual Activity  Alcohol use: Yes Comment: rare  Drug use: No  
 Sexual activity: Yes  
  Partners: Female Lifestyle  Physical activity:  
  Days per week: Not on file Minutes per session: Not on file  Stress: Not on file Relationships  Social connections:  
  Talks on phone: Not on file Gets together: Not on file Attends Denominational service: Not on file Active member of club or organization: Not on file Attends meetings of clubs or organizations: Not on file Relationship status: Not on file  Intimate partner violence:  
  Fear of current or ex partner: Not on file Emotionally abused: Not on file Physically abused: Not on file Forced sexual activity: Not on file Other Topics Concern  Not on file Social History Narrative  Not on file ALLERGIES: Ativan [lorazepam] and Avelox [moxifloxacin] Review of Systems Constitutional: Positive for fatigue. Neurological: Positive for weakness, numbness and headaches. Vitals:  
 04/16/19 9207 BP: 147/87 Pulse: 82 Resp: 20 Temp: 97.8 °F (36.6 °C) SpO2: 97% Weight: 130.2 kg (287 lb 0.6 oz) Height: 5' 8\" (1.727 m) Physical Exam  
Constitutional: He is oriented to person, place, and time. He appears well-developed and well-nourished. No distress. HENT:  
Head: Normocephalic.   
Mouth/Throat: Oropharynx is clear and moist.  
 Eyes: Pupils are equal, round, and reactive to light. Conjunctivae and EOM are normal.  
Neck: Normal range of motion. Neck supple. No JVD present. Cardiovascular: Normal rate, regular rhythm, normal heart sounds and intact distal pulses. Pulmonary/Chest: Effort normal and breath sounds normal.  
Abdominal: Soft. Bowel sounds are normal. He exhibits no distension. There is no tenderness. Musculoskeletal: Normal range of motion. He exhibits no edema, tenderness or deformity. Lymphadenopathy:  
  He has no cervical adenopathy. Neurological: He is alert and oriented to person, place, and time. No cranial nerve deficit or sensory deficit. Skin: Skin is warm and dry. Capillary refill takes less than 2 seconds. No rash noted. He is not diaphoretic. No erythema. Psychiatric: He has a normal mood and affect. Nursing note and vitals reviewed. MDM Procedures EKG  10:05 
NSR, HR 72, no STEMI, no ST depression, no T wave abnormality in contiguous leads only isolated to lead III. HA unchanged with fioricet and is 7/10. He is advised to follow up with neurology and Rx for toradol given. Pt advised he may have compressed nerve like with thoracic outlet syndrome or with spinal stenosis, disc protrusion or narrowing as the cause of his paresthesias. He had work up for central causes a few weeks prior and repeat head CT today for 5 days of sx and no acute finding noted. Pt advised to follow up with spine/ortho and PCP and will possibly need referral to CT surg if all other avenues are negative and has continued symptoms to see if thoracic outlet syndrome is the cause. The importance of follow up was explained to the patient. Pt does not have coldness or paleness of the arm and normal pulse so I do not suspect arterial compression. He does not have swelling or bluish discoloration so I do not suspect venous compression.   In addition, his sx are bilaterally which makes spinal causes more likely. His is diabetic thus a trial of steroids is not given. However, I do give 5 days Rx for nsaid to try to help with sx. Pt advised to return to ED if symptoms worsen, change, progress, or new symptoms arise.  
 
Dara Mckinney MD

## 2019-04-16 NOTE — DISCHARGE INSTRUCTIONS
Patient Education        Numbness and Tingling: Care Instructions  Your Care Instructions    Many things can cause numbness or tingling. Swelling may put pressure on a nerve. This could cause you to lose feeling or have a pins-and-needles sensation on part of your body. Nerves may be damaged from trauma, toxins, or diseases, such as diabetes or multiple sclerosis (MS). Sometimes, though, the cause is not clear. If there is no clear reason for your symptoms, and you are not having any other symptoms, your doctor may suggest watching and waiting for a while to see if the numbness or tingling goes away on its own. Your doctor may want you to have blood or nerve tests to find the cause of your symptoms. Follow-up care is a key part of your treatment and safety. Be sure to make and go to all appointments, and call your doctor if you are having problems. It's also a good idea to know your test results and keep a list of the medicines you take. How can you care for yourself at home? · If your doctor prescribes medicine, take it exactly as directed. Call your doctor if you think you are having a problem with your medicine. · If you have any swelling, put ice or a cold pack on the area for 10 to 20 minutes at a time. Put a thin cloth between the ice and your skin. When should you call for help? Call 911 anytime you think you may need emergency care. For example, call if:    · You have weakness, numbness, or tingling in both legs.     · You lose bowel or bladder control.     · You have symptoms of a stroke. These may include:  ? Sudden numbness, tingling, weakness, or loss of movement in your face, arm, or leg, especially on only one side of your body. ? Sudden vision changes. ? Sudden trouble speaking. ? Sudden confusion or trouble understanding simple statements. ? Sudden problems with walking or balance.   ? A sudden, severe headache that is different from past headaches.    Watch closely for changes in your health, and be sure to contact your doctor if you have any problems, or if:    · You do not get better as expected. Where can you learn more? Go to http://donald-liana.info/. Enter H759 in the search box to learn more about \"Numbness and Tingling: Care Instructions. \"  Current as of: Ally 3, 2018  Content Version: 11.9  © 6559-9638 [employer]. Care instructions adapted under license by [employer] (which disclaims liability or warranty for this information). If you have questions about a medical condition or this instruction, always ask your healthcare professional. [address] any warranty or liability for your use of this information.

## 2019-04-16 NOTE — LETTER
[address] 
[facility] 
[address] [facility] [id number] 
[phone] Work/School Note Date: 4/16/2019 To Whom It May concern: 
 
Theresa Quintana was seen and treated today. Theersa Quintana may return to work on 04/17/19.  
 
Sincerely, 
 
 
 
 
Trip Chatterjee MD

## 2019-07-08 ENCOUNTER — APPOINTMENT (OUTPATIENT)
Dept: GENERAL RADIOLOGY | Age: 44
End: 2019-07-08
Attending: EMERGENCY MEDICINE
Payer: MEDICAID

## 2019-07-08 ENCOUNTER — HOSPITAL ENCOUNTER (EMERGENCY)
Age: 44
Discharge: HOME | End: 2019-07-08
Attending: EMERGENCY MEDICINE
Payer: MEDICAID

## 2019-07-08 VITALS
HEART RATE: 73 BPM | TEMPERATURE: 97.8 F | OXYGEN SATURATION: 94 % | HEIGHT: 68 IN | DIASTOLIC BLOOD PRESSURE: 79 MMHG | WEIGHT: 291.01 LBS | RESPIRATION RATE: 17 BRPM | BODY MASS INDEX: 44.1 KG/M2 | SYSTOLIC BLOOD PRESSURE: 143 MMHG

## 2019-07-08 DIAGNOSIS — R07.9 CHEST PAIN, UNSPECIFIED TYPE: Primary | ICD-10-CM

## 2019-07-08 DIAGNOSIS — R51.9 NONINTRACTABLE HEADACHE, UNSPECIFIED CHRONICITY PATTERN, UNSPECIFIED HEADACHE TYPE: ICD-10-CM

## 2019-07-08 LAB
ANION GAP SERPL CALC-SCNC: 13 MMOL/L (ref 5–15)
ATRIAL RATE: 82 BPM
BASOPHILS # BLD: 0 K/UL (ref 0–0.1)
BASOPHILS NFR BLD: 1 % (ref 0–1)
BUN SERPL-MCNC: 17 MG/DL (ref 6–20)
BUN/CREAT SERPL: 14 (ref 12–20)
CALCIUM SERPL-MCNC: 8.2 MG/DL (ref 8.5–10.1)
CALCULATED P AXIS, ECG09: 1 DEGREES
CALCULATED R AXIS, ECG10: -10 DEGREES
CALCULATED T AXIS, ECG11: 10 DEGREES
CHLORIDE SERPL-SCNC: 105 MMOL/L (ref 97–108)
CO2 SERPL-SCNC: 23 MMOL/L (ref 21–32)
COMMENT, HOLDF: NORMAL
CREAT SERPL-MCNC: 1.2 MG/DL (ref 0.7–1.3)
D DIMER PPP FEU-MCNC: 0.42 MG/L FEU (ref 0–0.65)
DIAGNOSIS, 93000: NORMAL
DIFFERENTIAL METHOD BLD: NORMAL
EOSINOPHIL # BLD: 0.1 K/UL (ref 0–0.4)
EOSINOPHIL NFR BLD: 3 % (ref 0–7)
ERYTHROCYTE [DISTWIDTH] IN BLOOD BY AUTOMATED COUNT: 12.3 % (ref 11.5–14.5)
GLUCOSE SERPL-MCNC: 154 MG/DL (ref 65–100)
HCT VFR BLD AUTO: 42.7 % (ref 36.6–50.3)
HGB BLD-MCNC: 15 G/DL (ref 12.1–17)
IMM GRANULOCYTES # BLD AUTO: 0 K/UL (ref 0–0.04)
IMM GRANULOCYTES NFR BLD AUTO: 0 % (ref 0–0.5)
LYMPHOCYTES # BLD: 1.2 K/UL (ref 0.8–3.5)
LYMPHOCYTES NFR BLD: 28 % (ref 12–49)
MCH RBC QN AUTO: 29.8 PG (ref 26–34)
MCHC RBC AUTO-ENTMCNC: 35.1 G/DL (ref 30–36.5)
MCV RBC AUTO: 84.7 FL (ref 80–99)
MONOCYTES # BLD: 0.2 K/UL (ref 0–1)
MONOCYTES NFR BLD: 5 % (ref 5–13)
NEUTS SEG # BLD: 2.9 K/UL (ref 1.8–8)
NEUTS SEG NFR BLD: 63 % (ref 32–75)
NRBC # BLD: 0 K/UL (ref 0–0.01)
NRBC BLD-RTO: 0 PER 100 WBC
P-R INTERVAL, ECG05: 152 MS
PLATELET # BLD AUTO: 194 K/UL (ref 150–400)
PMV BLD AUTO: 10.5 FL (ref 8.9–12.9)
POTASSIUM SERPL-SCNC: 3.9 MMOL/L (ref 3.5–5.1)
Q-T INTERVAL, ECG07: 394 MS
QRS DURATION, ECG06: 98 MS
QTC CALCULATION (BEZET), ECG08: 460 MS
RBC # BLD AUTO: 5.04 M/UL (ref 4.1–5.7)
SAMPLES BEING HELD,HOLD: NORMAL
SODIUM SERPL-SCNC: 141 MMOL/L (ref 136–145)
TROPONIN I SERPL-MCNC: <0.05 NG/ML
VENTRICULAR RATE, ECG03: 82 BPM
WBC # BLD AUTO: 4.5 K/UL (ref 4.1–11.1)

## 2019-07-08 PROCEDURE — 85025 COMPLETE CBC W/AUTO DIFF WBC: CPT

## 2019-07-08 PROCEDURE — 80048 BASIC METABOLIC PNL TOTAL CA: CPT

## 2019-07-08 PROCEDURE — 85379 FIBRIN DEGRADATION QUANT: CPT

## 2019-07-08 PROCEDURE — 99285 EMERGENCY DEPT VISIT HI MDM: CPT

## 2019-07-08 PROCEDURE — 93005 ELECTROCARDIOGRAM TRACING: CPT

## 2019-07-08 PROCEDURE — 84484 ASSAY OF TROPONIN QUANT: CPT

## 2019-07-08 PROCEDURE — 96374 THER/PROPH/DIAG INJ IV PUSH: CPT

## 2019-07-08 PROCEDURE — 36415 COLL VENOUS BLD VENIPUNCTURE: CPT

## 2019-07-08 PROCEDURE — 74011250636 HC RX REV CODE- 250/636: Performed by: EMERGENCY MEDICINE

## 2019-07-08 PROCEDURE — 71046 X-RAY EXAM CHEST 2 VIEWS: CPT

## 2019-07-08 RX ORDER — KETOROLAC TROMETHAMINE 30 MG/ML
30 INJECTION, SOLUTION INTRAMUSCULAR; INTRAVENOUS
Status: COMPLETED | OUTPATIENT
Start: 2019-07-08 | End: 2019-07-08

## 2019-07-08 RX ADMIN — KETOROLAC TROMETHAMINE 30 MG: 30 INJECTION, SOLUTION INTRAMUSCULAR at 10:03

## 2019-07-08 NOTE — ED TRIAGE NOTES
Triage: pt c/o non-radiating mid chest pain and increased pain upon deep inspiration starting Friday. Pt also reports neck and posterior head pain starting Saturday. Denies injury, hitting head, N/V/D.

## 2019-07-08 NOTE — ED PROVIDER NOTES
HPI     Pt is a 41 y.o. M Past medical history diabetes, GERD, hypertension,chronic pain emergency department with complaint of chest pain and headache for 4 days. His Chest pain is sternal and not radiating. Pain is stabbing and intermittent. His pain is worse with deep breath. It is unchanged with exertion. He feels it is causing him trouble swallowing. However, he has been able to swallow/eat since pain started 3 days ago. He denies shortness of breath or diaphoresis. He has started having occipital HA and posterior neck pain as well. His says it is located where skull and neck meet. No paresthesias or weakness. No visual changes or photophobia. No other complaints at this time. Past Medical History:   Diagnosis Date    Asthma     Bronchitis     Chronic pain     Depression     Diabetes (HCC)     type 2    Gastrointestinal disorder     GERD    Hypertension     MI (myocardial infarction) ([facility] [address]     x 2 per patient    Murmur     per patient    Otitis media     Sinus infection     Vertigo 3/4/2013       History reviewed. No pertinent surgical history.       Family History:   Problem Relation Age of Onset    Kidney Disease Mother     Hypertension Mother     Diabetes Mother     Cancer Mother         uterine    Elevated Lipids Mother     Coronary Artery Disease Father         premature    Hypertension Father     Diabetes Father     High Cholesterol Father     Cancer Father         skin    Elevated Lipids Father     Heart Disease Father     Cancer Sister         uterine    Hypertension Brother     High Cholesterol Brother     Coronary Artery Disease Paternal Uncle     Coronary Artery Disease Paternal Grandmother     Coronary Artery Disease Paternal Grandfather        Social History     Socioeconomic History    Marital status:      Spouse name: Not on file    Number of children: Not on file    Years of education: Not on file    Highest education level: Not on file Occupational History    Not on file   Social Needs    Financial resource strain: Not on file    Food insecurity:     Worry: Not on file     Inability: Not on file    Transportation needs:     Medical: Not on file     Non-medical: Not on file   Tobacco Use    Smoking status: Former Smoker    Smokeless tobacco: Never Used    Tobacco comment: quit over 10 years   Substance and Sexual Activity    Alcohol use: Yes     Comment: rare    Drug use: No    Sexual activity: Yes     Partners: Female   Lifestyle    Physical activity:     Days per week: Not on file     Minutes per session: Not on file    Stress: Not on file   Relationships    Social connections:     Talks on phone: Not on file     Gets together: Not on file     Attends [employer] service: Not on file     Active member of club or organization: Not on file     Attends meetings of clubs or organizations: Not on file     Relationship status: Not on file    Intimate partner violence:     Fear of current or ex partner: Not on file     Emotionally abused: Not on file     Physically abused: Not on file     Forced sexual activity: Not on file   Other Topics Concern    Not on file   Social History Narrative    Not on file         ALLERGIES: Ativan [lorazepam] and Avelox [moxifloxacin]    Review of Systems   Respiratory: Negative for shortness of breath. Cardiovascular: Positive for chest pain. Gastrointestinal: Positive for nausea. Neurological: Positive for headaches. Vitals:    07/08/19 0938   BP: 153/81   Pulse: 84   Resp: 17   Temp: 98.4 °F (36.9 °C)   SpO2: 96%   Weight: 132 kg (291 lb 0.1 oz)   Height: 5' 8\" (1.727 m)            Physical Exam   Constitutional: He is oriented to person, place, and time. He appears well-developed and well-nourished. No distress. Obese male   HENT:   Head: Normocephalic. Mouth/Throat: Oropharynx is clear and moist.   Eyes: Pupils are equal, round, and reactive to light.  Conjunctivae and EOM are normal. Neck: Normal range of motion. Neck supple. No JVD present. Cardiovascular: Normal rate, regular rhythm, normal heart sounds and intact distal pulses. Pulmonary/Chest: Effort normal and breath sounds normal.   Abdominal: Soft. Bowel sounds are normal. He exhibits no distension. There is no tenderness. Musculoskeletal: Normal range of motion. He exhibits no edema, tenderness or deformity. Lymphadenopathy:     He has no cervical adenopathy. Neurological: He is alert and oriented to person, place, and time. No cranial nerve deficit or sensory deficit. Skin: Skin is warm and dry. Capillary refill takes less than 2 seconds. No rash noted. He is not diaphoretic. No erythema. Psychiatric: He has a normal mood and affect. Nursing note and vitals reviewed. MDM       Procedures    ED EKG interpretation:  Rhythm: normal sinus rhythm; and regular . Rate (approx.): 82; intervals normal.  ST/T wave: normal; no ST elevation or ST depression. EKG documented by Vj Hernandez MD, scribe, as interpreted by Ruma Waller MD, ED MD.    10:47 AM  Call to Dr. Melinda Tatum office since pt with h/o CAD (MI) and HTN, DM, HLD with normal work up today in ED. Last admit on 3/2019 with normal Echo at that time. He has no cardiologist.  His HEART score puts him in low risk category. Dr. Kathrin Schofield has offered to see the pt today. However, when I explain to the pt he can be seen today and go directly to Dr. Melinda Tatum office from ED, he says he prefers to schedule appt. I have called Dr. Melinda Tatum office back to let them know that. Return precautions given to the pt.    10:54 AM  Patient's results have been reviewed with them. Patient and/or family have verbally conveyed their understanding and agreement of the patient's signs, symptoms, diagnosis, treatment and prognosis and additionally agree to follow up as recommended or return to the Emergency Room should their condition change prior to follow-up.   Discharge instructions have also been provided to the patient with some educational information regarding their diagnosis as well a list of reasons why they would want to return to the ER prior to their follow-up appointment should their condition change.     Jennifer Ivory MD

## 2019-07-08 NOTE — ED NOTES
Pt ambulatory out of ED with discharge instructions and prescriptions in hand given by Dr. Ghislaine Santiago; pt verbalized understanding of discharge paperwork and time allotted for questions. VSS. Pt alert and oriented. Pt accompanied by girlfriend.

## 2019-07-08 NOTE — DISCHARGE INSTRUCTIONS
Patient Education        Chest Pain: Care Instructions  Your Care Instructions    There are many things that can cause chest pain. Some are not serious and will get better on their own in a few days. But some kinds of chest pain need more testing and treatment. Your doctor may have recommended a follow-up visit in the next 8 to 12 hours. If you are not getting better, you may need more tests or treatment. Even though your doctor has released you, you still need to watch for any problems. The doctor carefully checked you, but sometimes problems can develop later. If you have new symptoms or if your symptoms do not get better, get medical care right away. If you have worse or different chest pain or pressure that lasts more than 5 minutes or you passed out (lost consciousness), call 911 or seek other emergency help right away. A medical visit is only one step in your treatment. Even if you feel better, you still need to do what your doctor recommends, such as going to all suggested follow-up appointments and taking medicines exactly as directed. This will help you recover and help prevent future problems. How can you care for yourself at home? · Rest until you feel better. · Take your medicine exactly as prescribed. Call your doctor if you think you are having a problem with your medicine. · Do not drive after taking a prescription pain medicine. When should you call for help? Call 911 if:    · You passed out (lost consciousness).     · You have severe difficulty breathing.     · You have symptoms of a heart attack. These may include:  ? Chest pain or pressure, or a strange feeling in your chest.  ? Sweating. ? Shortness of breath. ? Nausea or vomiting. ? Pain, pressure, or a strange feeling in your back, neck, jaw, or upper belly or in one or both shoulders or arms. ? Lightheadedness or sudden weakness. ? A fast or irregular heartbeat.   After you call 911, the  may tell you to chew 1 adult-strength or 2 to 4 low-dose aspirin. Wait for an ambulance. Do not try to drive yourself.    Call your doctor today if:    · You have any trouble breathing.     · Your chest pain gets worse.     · You are dizzy or lightheaded, or you feel like you may faint.     · You are not getting better as expected.     · You are having new or different chest pain. Where can you learn more? Go to http://donald-liana.info/. Enter A120 in the search box to learn more about \"Chest Pain: Care Instructions. \"  Current as of: September 23, 2018  Content Version: 11.9  © 9997-1328 [employer]. Care instructions adapted under license by [employer] (which disclaims liability or warranty for this information). If you have questions about a medical condition or this instruction, always ask your healthcare professional. [place] [address] any warranty or liability for your use of this information. Patient Education        Headache: Care Instructions  Your Care Instructions    Headaches have many possible causes. Most headaches aren't a sign of a more serious problem, and they will get better on their own. Home treatment may help you feel better faster. The doctor has checked you carefully, but problems can develop later. If you notice any problems or new symptoms, get medical treatment right away. Follow-up care is a key part of your treatment and safety. Be sure to make and go to all appointments, and call your doctor if you are having problems. It's also a good idea to know your test results and keep a list of the medicines you take. How can you care for yourself at home? · Do not drive if you have taken a prescription pain medicine. · Rest in a quiet, dark room until your headache is gone. Close your eyes and try to relax or go to sleep. Don't watch TV or read. · Put a cold, moist cloth or cold pack on the painful area for 10 to 20 minutes at a time.  Put a thin cloth between the cold pack and your skin. · Use a warm, moist towel or a heating pad set on low to relax tight shoulder and neck muscles. · Have someone gently massage your neck and shoulders. · Take pain medicines exactly as directed. ? If the doctor gave you a prescription medicine for pain, take it as prescribed. ? If you are not taking a prescription pain medicine, ask your doctor if you can take an over-the-counter medicine. · Be careful not to take pain medicine more often than the instructions allow, because you may get worse or more frequent headaches when the medicine wears off. · Do not ignore new symptoms that occur with a headache, such as a fever, weakness or numbness, vision changes, or confusion. These may be signs of a more serious problem. To prevent headaches  · Keep a headache diary so you can figure out what triggers your headaches. Avoiding triggers may help you prevent headaches. Record when each headache began, how long it lasted, and what the pain was like (throbbing, aching, stabbing, or dull). Write down any other symptoms you had with the headache, such as nausea, flashing lights or dark spots, or sensitivity to bright light or loud noise. Note if the headache occurred near your period. List anything that might have triggered the headache, such as certain foods (chocolate, cheese, wine) or odors, smoke, bright light, stress, or lack of sleep. · Find healthy ways to deal with stress. Headaches are most common during or right after stressful times. Take time to relax before and after you do something that has caused a headache in the past.  · Try to keep your muscles relaxed by keeping good posture. Check your jaw, face, neck, and shoulder muscles for tension, and try relaxing them. When sitting at a desk, change positions often, and stretch for 30 seconds each hour. · Get plenty of sleep and exercise. · Eat regularly and well.  Long periods without food can trigger a headache. · Treat yourself to a massage. Some people find that regular massages are very helpful in relieving tension. · Limit caffeine by not drinking too much coffee, tea, or soda. But don't quit caffeine suddenly, because that can also give you headaches. · Reduce eyestrain from computers by blinking frequently and looking away from the computer screen every so often. Make sure you have proper eyewear and that your monitor is set up properly, about an arm's length away. · Seek help if you have depression or anxiety. Your headaches may be linked to these conditions. Treatment can both prevent headaches and help with symptoms of anxiety or depression. When should you call for help? Call 911 anytime you think you may need emergency care. For example, call if:    · You have signs of a stroke. These may include:  ? Sudden numbness, paralysis, or weakness in your face, arm, or leg, especially on only one side of your body. ? Sudden vision changes. ? Sudden trouble speaking. ? Sudden confusion or trouble understanding simple statements. ? Sudden problems with walking or balance. ? A sudden, severe headache that is different from past headaches.    Call your doctor now or seek immediate medical care if:    · You have a new or worse headache.     · Your headache gets much worse. Where can you learn more? Go to http://donald-liana.info/. Enter M271 in the search box to learn more about \"Headache: Care Instructions. \"  Current as of: Ally 3, 2018  Content Version: 11.9  © 9750-6616 [employer]. Care instructions adapted under license by [employer] (which disclaims liability or warranty for this information). If you have questions about a medical condition or this instruction, always ask your healthcare professional. [place] [address] any warranty or liability for your use of this information.

## 2019-07-08 NOTE — LETTER
[address] 
[facility] 
[address] [facility] [address] [id number] 
[phone] Work/School Note Date: 7/8/2019 To Whom It May concern: 
 
Nagi Phillips was seen and treated today. Nagi Phillips may return to work on 07/09/19.  
 
Sincerely, 
 
 
 
 
Ramesh Baron MD

## 2019-09-04 ENCOUNTER — APPOINTMENT (OUTPATIENT)
Dept: GENERAL RADIOLOGY | Age: 44
End: 2019-09-04
Attending: EMERGENCY MEDICINE
Payer: MEDICAID

## 2019-09-04 ENCOUNTER — APPOINTMENT (OUTPATIENT)
Dept: CT IMAGING | Age: 44
End: 2019-09-04
Attending: EMERGENCY MEDICINE
Payer: MEDICAID

## 2019-09-04 ENCOUNTER — HOSPITAL ENCOUNTER (EMERGENCY)
Age: 44
Discharge: HOME | End: 2019-09-04
Attending: EMERGENCY MEDICINE | Admitting: EMERGENCY MEDICINE
Payer: MEDICAID

## 2019-09-04 ENCOUNTER — APPOINTMENT (OUTPATIENT)
Dept: MRI IMAGING | Age: 44
End: 2019-09-04
Attending: EMERGENCY MEDICINE
Payer: MEDICAID

## 2019-09-04 VITALS
HEART RATE: 66 BPM | TEMPERATURE: 98.7 F | WEIGHT: 294.09 LBS | RESPIRATION RATE: 17 BRPM | DIASTOLIC BLOOD PRESSURE: 82 MMHG | BODY MASS INDEX: 44.72 KG/M2 | SYSTOLIC BLOOD PRESSURE: 135 MMHG | OXYGEN SATURATION: 99 %

## 2019-09-04 DIAGNOSIS — R07.89 ATYPICAL CHEST PAIN: ICD-10-CM

## 2019-09-04 DIAGNOSIS — R20.2 PARESTHESIA: ICD-10-CM

## 2019-09-04 DIAGNOSIS — F32.A DEPRESSION, UNSPECIFIED DEPRESSION TYPE: ICD-10-CM

## 2019-09-04 DIAGNOSIS — R51.9 NONINTRACTABLE HEADACHE, UNSPECIFIED CHRONICITY PATTERN, UNSPECIFIED HEADACHE TYPE: Primary | ICD-10-CM

## 2019-09-04 LAB
ALBUMIN SERPL-MCNC: 4 G/DL (ref 3.5–5)
ALBUMIN/GLOB SERPL: 1.1 {RATIO} (ref 1.1–2.2)
ALP SERPL-CCNC: 120 U/L (ref 45–117)
ALT SERPL-CCNC: 36 U/L (ref 12–78)
AMPHET UR QL SCN: NEGATIVE
ANION GAP SERPL CALC-SCNC: 7 MMOL/L (ref 5–15)
APTT PPP: 29 SEC (ref 22.1–32)
AST SERPL-CCNC: 18 U/L (ref 15–37)
ATRIAL RATE: 48 BPM
ATRIAL RATE: 75 BPM
BARBITURATES UR QL SCN: NEGATIVE
BASOPHILS # BLD: 0.1 K/UL (ref 0–0.1)
BASOPHILS NFR BLD: 1 % (ref 0–1)
BENZODIAZ UR QL: NEGATIVE
BILIRUB SERPL-MCNC: 0.5 MG/DL (ref 0.2–1)
BUN SERPL-MCNC: 16 MG/DL (ref 6–20)
BUN/CREAT SERPL: 15 (ref 12–20)
CALCIUM SERPL-MCNC: 8.3 MG/DL (ref 8.5–10.1)
CALCULATED P AXIS, ECG09: 22 DEGREES
CALCULATED P AXIS, ECG09: 58 DEGREES
CALCULATED R AXIS, ECG10: -10 DEGREES
CALCULATED R AXIS, ECG10: -2 DEGREES
CALCULATED T AXIS, ECG11: 0 DEGREES
CALCULATED T AXIS, ECG11: 8 DEGREES
CANNABINOIDS UR QL SCN: NEGATIVE
CHLORIDE SERPL-SCNC: 110 MMOL/L (ref 97–108)
CO2 SERPL-SCNC: 25 MMOL/L (ref 21–32)
COCAINE UR QL SCN: NEGATIVE
COMMENT, HOLDF: NORMAL
CREAT SERPL-MCNC: 1.08 MG/DL (ref 0.7–1.3)
DIAGNOSIS, 93000: NORMAL
DIAGNOSIS, 93000: NORMAL
DIFFERENTIAL METHOD BLD: ABNORMAL
DRUG SCRN COMMENT,DRGCM: NORMAL
EOSINOPHIL # BLD: 0.2 K/UL (ref 0–0.4)
EOSINOPHIL NFR BLD: 3 % (ref 0–7)
ERYTHROCYTE [DISTWIDTH] IN BLOOD BY AUTOMATED COUNT: 12.1 % (ref 11.5–14.5)
GLOBULIN SER CALC-MCNC: 3.6 G/DL (ref 2–4)
GLUCOSE BLD STRIP.AUTO-MCNC: 109 MG/DL (ref 65–100)
GLUCOSE SERPL-MCNC: 107 MG/DL (ref 65–100)
HCT VFR BLD AUTO: 44.1 % (ref 36.6–50.3)
HGB BLD-MCNC: 14.4 G/DL (ref 12.1–17)
IMM GRANULOCYTES # BLD AUTO: 0 K/UL (ref 0–0.04)
IMM GRANULOCYTES NFR BLD AUTO: 1 % (ref 0–0.5)
INR PPP: 1 (ref 0.9–1.1)
LYMPHOCYTES # BLD: 1.4 K/UL (ref 0.8–3.5)
LYMPHOCYTES NFR BLD: 22 % (ref 12–49)
MCH RBC QN AUTO: 29.4 PG (ref 26–34)
MCHC RBC AUTO-ENTMCNC: 32.7 G/DL (ref 30–36.5)
MCV RBC AUTO: 90 FL (ref 80–99)
METHADONE UR QL: NEGATIVE
MONOCYTES # BLD: 0.5 K/UL (ref 0–1)
MONOCYTES NFR BLD: 8 % (ref 5–13)
NEUTS SEG # BLD: 4 K/UL (ref 1.8–8)
NEUTS SEG NFR BLD: 65 % (ref 32–75)
NRBC # BLD: 0 K/UL (ref 0–0.01)
NRBC BLD-RTO: 0 PER 100 WBC
OPIATES UR QL: NEGATIVE
P-R INTERVAL, ECG05: 136 MS
P-R INTERVAL, ECG05: 156 MS
PCP UR QL: NEGATIVE
PLATELET # BLD AUTO: 198 K/UL (ref 150–400)
PMV BLD AUTO: 9.7 FL (ref 8.9–12.9)
POTASSIUM SERPL-SCNC: 3.7 MMOL/L (ref 3.5–5.1)
PROT SERPL-MCNC: 7.6 G/DL (ref 6.4–8.2)
PROTHROMBIN TIME: 10.3 SEC (ref 9–11.1)
Q-T INTERVAL, ECG07: 400 MS
Q-T INTERVAL, ECG07: 460 MS
QRS DURATION, ECG06: 94 MS
QRS DURATION, ECG06: 96 MS
QTC CALCULATION (BEZET), ECG08: 410 MS
QTC CALCULATION (BEZET), ECG08: 446 MS
RBC # BLD AUTO: 4.9 M/UL (ref 4.1–5.7)
SAMPLES BEING HELD,HOLD: NORMAL
SERVICE CMNT-IMP: ABNORMAL
SODIUM SERPL-SCNC: 142 MMOL/L (ref 136–145)
THERAPEUTIC RANGE,PTTT: NORMAL SECS (ref 58–77)
TROPONIN I SERPL-MCNC: <0.05 NG/ML
TROPONIN I SERPL-MCNC: <0.05 NG/ML
VENTRICULAR RATE, ECG03: 48 BPM
VENTRICULAR RATE, ECG03: 75 BPM
WBC # BLD AUTO: 6.1 K/UL (ref 4.1–11.1)

## 2019-09-04 PROCEDURE — 70496 CT ANGIOGRAPHY HEAD: CPT

## 2019-09-04 PROCEDURE — 85610 PROTHROMBIN TIME: CPT

## 2019-09-04 PROCEDURE — 96361 HYDRATE IV INFUSION ADD-ON: CPT

## 2019-09-04 PROCEDURE — 70450 CT HEAD/BRAIN W/O DYE: CPT

## 2019-09-04 PROCEDURE — 93005 ELECTROCARDIOGRAM TRACING: CPT

## 2019-09-04 PROCEDURE — 0042T CT PERF W CBF: CPT

## 2019-09-04 PROCEDURE — 80307 DRUG TEST PRSMV CHEM ANLYZR: CPT

## 2019-09-04 PROCEDURE — 71045 X-RAY EXAM CHEST 1 VIEW: CPT

## 2019-09-04 PROCEDURE — 70551 MRI BRAIN STEM W/O DYE: CPT

## 2019-09-04 PROCEDURE — 96374 THER/PROPH/DIAG INJ IV PUSH: CPT

## 2019-09-04 PROCEDURE — 85025 COMPLETE CBC W/AUTO DIFF WBC: CPT

## 2019-09-04 PROCEDURE — 74011000258 HC RX REV CODE- 258: Performed by: RADIOLOGY

## 2019-09-04 PROCEDURE — 74011250636 HC RX REV CODE- 250/636: Performed by: EMERGENCY MEDICINE

## 2019-09-04 PROCEDURE — 80053 COMPREHEN METABOLIC PANEL: CPT

## 2019-09-04 PROCEDURE — 99285 EMERGENCY DEPT VISIT HI MDM: CPT

## 2019-09-04 PROCEDURE — 96375 TX/PRO/DX INJ NEW DRUG ADDON: CPT

## 2019-09-04 PROCEDURE — 74011636320 HC RX REV CODE- 636/320: Performed by: RADIOLOGY

## 2019-09-04 PROCEDURE — 74011250637 HC RX REV CODE- 250/637: Performed by: EMERGENCY MEDICINE

## 2019-09-04 PROCEDURE — 85730 THROMBOPLASTIN TIME PARTIAL: CPT

## 2019-09-04 PROCEDURE — 84484 ASSAY OF TROPONIN QUANT: CPT

## 2019-09-04 PROCEDURE — 82962 GLUCOSE BLOOD TEST: CPT

## 2019-09-04 PROCEDURE — 36415 COLL VENOUS BLD VENIPUNCTURE: CPT

## 2019-09-04 RX ORDER — PROCHLORPERAZINE EDISYLATE 5 MG/ML
10 INJECTION INTRAMUSCULAR; INTRAVENOUS
Status: COMPLETED | OUTPATIENT
Start: 2019-09-04 | End: 2019-09-04

## 2019-09-04 RX ORDER — DIPHENHYDRAMINE HYDROCHLORIDE 50 MG/ML
50 INJECTION, SOLUTION INTRAMUSCULAR; INTRAVENOUS
Status: COMPLETED | OUTPATIENT
Start: 2019-09-04 | End: 2019-09-04

## 2019-09-04 RX ORDER — GUAIFENESIN 100 MG/5ML
325 LIQUID (ML) ORAL
Status: COMPLETED | OUTPATIENT
Start: 2019-09-04 | End: 2019-09-04

## 2019-09-04 RX ORDER — BUTALBITAL, ACETAMINOPHEN AND CAFFEINE 50; 325; 40 MG/1; MG/1; MG/1
2 TABLET ORAL
Status: COMPLETED | OUTPATIENT
Start: 2019-09-04 | End: 2019-09-04

## 2019-09-04 RX ORDER — SODIUM CHLORIDE 0.9 % (FLUSH) 0.9 %
10 SYRINGE (ML) INJECTION
Status: COMPLETED | OUTPATIENT
Start: 2019-09-04 | End: 2019-09-04

## 2019-09-04 RX ADMIN — PROCHLORPERAZINE EDISYLATE 10 MG: 5 INJECTION INTRAMUSCULAR; INTRAVENOUS at 09:56

## 2019-09-04 RX ADMIN — DIPHENHYDRAMINE HYDROCHLORIDE 50 MG: 50 INJECTION, SOLUTION INTRAMUSCULAR; INTRAVENOUS at 09:56

## 2019-09-04 RX ADMIN — SODIUM CHLORIDE 1000 ML: 900 INJECTION, SOLUTION INTRAVENOUS at 09:57

## 2019-09-04 RX ADMIN — ASPIRIN 81 MG 324 MG: 81 TABLET ORAL at 09:56

## 2019-09-04 RX ADMIN — BUTALBITAL, ACETAMINOPHEN AND CAFFEINE 2 TABLET: 50; 325; 40 TABLET ORAL at 09:56

## 2019-09-04 RX ADMIN — Medication 10 ML: at 08:43

## 2019-09-04 RX ADMIN — IOPAMIDOL 120 ML: 755 INJECTION, SOLUTION INTRAVENOUS at 08:43

## 2019-09-04 RX ADMIN — SODIUM CHLORIDE 100 ML: 900 INJECTION, SOLUTION INTRAVENOUS at 08:42

## 2019-09-04 NOTE — ED NOTES
1210 Bedside shift change report given to DIANA Garcia (oncoming nurse) by Alana Singleton RN (offgoing nurse). Report included the following information SBAR and ED Summary. 1240 pt to MRI    12 MD has reviewed discharge instructions with the patient. The patient verbalized understanding.

## 2019-09-04 NOTE — ED PROVIDER NOTES
41 y.o. male with past medical history significant for HTN, DMT2, CAD with h/o MI, h/o stroke, presents ambulatory to the ED with chief complaint of left arm numbness. Patient states that his symptoms began at 0300 this morning when he woke up with a headache. The headache started in the front of the head and radiated across the top to the back of the head. Then at 0600 this morning, patient felt the acute onset of 8/10 discomfort over the left clavicle, left shoulder, and left arm. In addition to the pain, he states that his left arm felt weak and numb and he was unable to pull himself up. At this point, patient reports that his left arm is no longer weak, but it still feels numb. He reports a history of stroke in the past.  Patient specifically denies leg numbness or weakness, speech difficulty, facial asymmetry, facial numbness, or confusion. There are no other acute medical concerns at this time. Social hx: Denies current Tobacco use (Former smoker); Positive EtOH use (rare); Denies Illicit Drug Abuse    PCP: Doni Reid MD    Note written by Lu Amor. Dana Schroederd, [facility] [address], as dictated by Kylie Arce DO 8:03 AM     The history is provided by the patient. No  was used. Past Medical History:   Diagnosis Date    Asthma     Bronchitis     Chronic pain     Depression     Diabetes (HCC)     type 2    Gastrointestinal disorder     GERD    Hypertension     MI [facility]myocardial infarction) ([facility] [address]     x 2 per patient    Murmur     per patient    Otitis media     Sinus infection     Vertigo 3/4/2013       No past surgical history on file.       Family History:   Problem Relation Age of Onset    Kidney Disease Mother     Hypertension Mother     Diabetes Mother     Cancer Mother         uterine    Elevated Lipids Mother     Coronary Artery Disease Father         premature    Hypertension Father     Diabetes Father     High Cholesterol Father     Cancer Father         skin  Elevated Lipids Father     Heart Disease Father     Cancer Sister         uterine    Hypertension Brother     High Cholesterol Brother     Coronary Artery Disease Paternal Uncle     Coronary Artery Disease Paternal Grandmother     Coronary Artery Disease Paternal Grandfather        Social History     Socioeconomic History    Marital status:      Spouse name: Not on file    Number of children: Not on file    Years of education: Not on file    Highest education level: Not on file   Occupational History    Not on file   Social Needs    Financial resource strain: Not on file    Food insecurity:     Worry: Not on file     Inability: Not on file    Transportation needs:     Medical: Not on file     Non-medical: Not on file   Tobacco Use    Smoking status: Former Smoker    Smokeless tobacco: Never Used    Tobacco comment: quit over 10 years   Substance and Sexual Activity    Alcohol use: Yes     Comment: rare    Drug use: No    Sexual activity: Yes     Partners: Female   Lifestyle    Physical activity:     Days per week: Not on file     Minutes per session: Not on file    Stress: Not on file   Relationships    Social connections:     Talks on phone: Not on file     Gets together: Not on file     Attends [employer] service: Not on file     Active member of club or organization: Not on file     Attends meetings of clubs or organizations: Not on file     Relationship status: Not on file    Intimate partner violence:     Fear of current or ex partner: Not on file     Emotionally abused: Not on file     Physically abused: Not on file     Forced sexual activity: Not on file   Other Topics Concern    Not on file   Social History Narrative    Not on file         ALLERGIES: Patient has no known allergies. Review of Systems   Constitutional: Negative for fever. HENT: Negative for trouble swallowing. Eyes: Negative for visual disturbance. Respiratory: Negative for cough. Cardiovascular: Negative for chest pain. Gastrointestinal: Negative for abdominal pain. Genitourinary: Negative for difficulty urinating. Musculoskeletal: Positive for myalgias (left arm). Negative for gait problem. Skin: Negative for rash. Neurological: Positive for weakness (left arm, resolved), numbness (left arm) and headaches. Negative for facial asymmetry and speech difficulty. Hematological: Does not bruise/bleed easily. Psychiatric/Behavioral: Negative for sleep disturbance. Vitals:    09/04/19 1115 09/04/19 1200 09/04/19 1215 09/04/19 1245   BP: 116/66 122/75 118/71 114/69   Pulse: (!) 50 (!) 53 (!) 50 (!) 54   Resp: 13 15 13 13   Temp:   98.7 °F (37.1 °C)    SpO2: 97% 97% 97% 97%   Weight:                Physical Exam   Constitutional: He is oriented to person, place, and time. He appears well-developed and well-nourished. HENT:   Head: Normocephalic and atraumatic. Eyes: Conjunctivae are normal.   Neck: Normal range of motion. Cardiovascular: Normal rate and intact distal pulses. Pulmonary/Chest: Effort normal and breath sounds normal. No respiratory distress. Abdominal: Soft. Bowel sounds are normal. There is no tenderness. There is no rebound and no guarding. Musculoskeletal: Normal range of motion. Neurological: He is alert and oriented to person, place, and time. PERRL  EOM intact  Able to hear bilaterally  SILT to both sides of forehead, cheeks, and jaw  Uvula midline, soft palate raises  Vision grossly intact  No facial asymmetry   Able to shrug shoulders and turn head from side to side  Able to stick tongue out and move side to side. Inconsistencies present on exam where he has mild pronator drift on the left, but then he will bring his left arm back up some.    There was initially a mild decrease in  strength on the left, however when I pulled him towards me, his  strength would be equal. Same can be said about elbow flexion and extension on the left.  strength and elbow flexion/extension on the right is normal.   Despite light touch or heavier touch, he says that he feels absolutely nothing in the left upper extremity. SILT median, radial, and ulnar nerves on the right. Normal DTRs at the elbows and knees  Able to stand and walk unassisted  Normal Romberg  Able to stand on tip toes  5/5 flexion/extension both hips, knees, and dorsi/plantar flexion  SILT both lower extremities     Skin: Skin is warm and dry. Psychiatric: His behavior is normal.   Nursing note and vitals reviewed. Note written by Darrel Diamond. [place], [address] [address], as dictated by Kvng Fritz DO 8:03 AM      MDM       Procedures     ED EKG interpretation:  Time 3956  Rhythm: normal sinus rhythm; and regular . Rate (approx.): 75 bpm; Axis: normal; SC interval, QRS interval, QT/QTc: normal; ST/T wave: no changes   Note written by Darrel Diamond. Richie Hodges, as dictated by Kvng Fritz DO 8:03 AM     CONSULT NOTE:  8:16 AM Kvng Fritz DO spoke with Dr. Tracy Cueva, Consult for tele-neurology. Discussed available diagnostic tests and clinical findings. Dr. Tracy Cueva will evaluate the patient once his imagaing studies are complete. PROGRESS NOTE:  9:28 AM   Tele-neurology evaluated the patient remotely via tele-neuro monitor. Recommends obtaining MRI brain. If negative, then patient may be discharged home. PROGRESS NOTE:  9:31 AM  Radiologist has looked at patient's imaging so far, and states that imaging is negative for anything acute. PROGRESS NOTE:  2:16 PM  MRI negative for acute infarction. Will prepare for discharge and have patient follow-up with neurology on an outpatient basis. 2:17 PM  Patient's results have been reviewed with them.   Patient and/or family have verbally conveyed their understanding and agreement of the patient's signs, symptoms, diagnosis, treatment and prognosis and additionally agree to follow up as recommended or return to the Emergency Room should their condition change prior to follow-up. Discharge instructions have also been provided to the patient with some educational information regarding their diagnosis as well a list of reasons why they would want to return to the ER prior to their follow-up appointment should their condition change.

## 2019-09-04 NOTE — ED NOTES
Pt reports dullness to left arm but still has sensation for this RN. During MD assessment pt states he couldn't feel him touching at all.

## 2019-09-04 NOTE — ED TRIAGE NOTES
Left arm numbness that started at 0600, +headache at 0330 this am, denies trauma or head injury, denies leg or face numbness, denies fever, denies sob, +nausea, denies speech difficulty, denies visual difficulty

## 2019-09-04 NOTE — DISCHARGE INSTRUCTIONS
Patient Education        Chest Pain: Care Instructions  Your Care Instructions    There are many things that can cause chest pain. Some are not serious and will get better on their own in a few days. But some kinds of chest pain need more testing and treatment. Your doctor may have recommended a follow-up visit in the next 8 to 12 hours. If you are not getting better, you may need more tests or treatment. Even though your doctor has released you, you still need to watch for any problems. The doctor carefully checked you, but sometimes problems can develop later. If you have new symptoms or if your symptoms do not get better, get medical care right away. If you have worse or different chest pain or pressure that lasts more than 5 minutes or you passed out (lost consciousness), call 911 or seek other emergency help right away. A medical visit is only one step in your treatment. Even if you feel better, you still need to do what your doctor recommends, such as going to all suggested follow-up appointments and taking medicines exactly as directed. This will help you recover and help prevent future problems. How can you care for yourself at home? · Rest until you feel better. · Take your medicine exactly as prescribed. Call your doctor if you think you are having a problem with your medicine. · Do not drive after taking a prescription pain medicine. When should you call for help? Call 911 if:    · You passed out (lost consciousness).     · You have severe difficulty breathing.     · You have symptoms of a heart attack. These may include:  ? Chest pain or pressure, or a strange feeling in your chest.  ? Sweating. ? Shortness of breath. ? Nausea or vomiting. ? Pain, pressure, or a strange feeling in your back, neck, jaw, or upper belly or in one or both shoulders or arms. ? Lightheadedness or sudden weakness. ? A fast or irregular heartbeat.   After you call 911, the  may tell you to chew 1 adult-strength or 2 to 4 low-dose aspirin. Wait for an ambulance. Do not try to drive yourself.    Call your doctor today if:    · You have any trouble breathing.     · Your chest pain gets worse.     · You are dizzy or lightheaded, or you feel like you may faint.     · You are not getting better as expected.     · You are having new or different chest pain. Where can you learn more? Go to http://donald-liana.info/. Enter A120 in the search box to learn more about \"Chest Pain: Care Instructions. \"  Current as of: September 23, 2018  Content Version: 12.1  © 6597-7244 [employer]. Care instructions adapted under license by [employer] (which disclaims liability or warranty for this information). If you have questions about a medical condition or this instruction, always ask your healthcare professional. [place] [id number] any warranty or liability for your use of this information. Patient Education        Headache: Care Instructions  Your Care Instructions    Headaches have many possible causes. Most headaches aren't a sign of a more serious problem, and they will get better on their own. Home treatment may help you feel better faster. The doctor has checked you carefully, but problems can develop later. If you notice any problems or new symptoms, get medical treatment right away. Follow-up care is a key part of your treatment and safety. Be sure to make and go to all appointments, and call your doctor if you are having problems. It's also a good idea to know your test results and keep a list of the medicines you take. How can you care for yourself at home? · Do not drive if you have taken a prescription pain medicine. · Rest in a quiet, dark room until your headache is gone. Close your eyes and try to relax or go to sleep. Don't watch TV or read. · Put a cold, moist cloth or cold pack on the painful area for 10 to 20 minutes at a time.  Put a thin cloth between the cold pack and your skin. · Use a warm, moist towel or a heating pad set on low to relax tight shoulder and neck muscles. · Have someone gently massage your neck and shoulders. · Take pain medicines exactly as directed. ? If the doctor gave you a prescription medicine for pain, take it as prescribed. ? If you are not taking a prescription pain medicine, ask your doctor if you can take an over-the-counter medicine. · Be careful not to take pain medicine more often than the instructions allow, because you may get worse or more frequent headaches when the medicine wears off. · Do not ignore new symptoms that occur with a headache, such as a fever, weakness or numbness, vision changes, or confusion. These may be signs of a more serious problem. To prevent headaches  · Keep a headache diary so you can figure out what triggers your headaches. Avoiding triggers may help you prevent headaches. Record when each headache began, how long it lasted, and what the pain was like (throbbing, aching, stabbing, or dull). Write down any other symptoms you had with the headache, such as nausea, flashing lights or dark spots, or sensitivity to bright light or loud noise. Note if the headache occurred near your period. List anything that might have triggered the headache, such as certain foods (chocolate, cheese, wine) or odors, smoke, bright light, stress, or lack of sleep. · Find healthy ways to deal with stress. Headaches are most common during or right after stressful times. Take time to relax before and after you do something that has caused a headache in the past.  · Try to keep your muscles relaxed by keeping good posture. Check your jaw, face, neck, and shoulder muscles for tension, and try relaxing them. When sitting at a desk, change positions often, and stretch for 30 seconds each hour. · Get plenty of sleep and exercise. · Eat regularly and well.  Long periods without food can trigger a headache. · Treat yourself to a massage. Some people find that regular massages are very helpful in relieving tension. · Limit caffeine by not drinking too much coffee, tea, or soda. But don't quit caffeine suddenly, because that can also give you headaches. · Reduce eyestrain from computers by blinking frequently and looking away from the computer screen every so often. Make sure you have proper eyewear and that your monitor is set up properly, about an arm's length away. · Seek help if you have depression or anxiety. Your headaches may be linked to these conditions. Treatment can both prevent headaches and help with symptoms of anxiety or depression. When should you call for help? Call 911 anytime you think you may need emergency care. For example, call if:    · You have signs of a stroke. These may include:  ? Sudden numbness, paralysis, or weakness in your face, arm, or leg, especially on only one side of your body. ? Sudden vision changes. ? Sudden trouble speaking. ? Sudden confusion or trouble understanding simple statements. ? Sudden problems with walking or balance. ? A sudden, severe headache that is different from past headaches.    Call your doctor now or seek immediate medical care if:    · You have a new or worse headache.     · Your headache gets much worse. Where can you learn more? Go to http://donald-liana.info/. Enter M271 in the search box to learn more about \"Headache: Care Instructions. \"  Current as of: March 28, 2019  Content Version: 12.1  © 7809-0930 [employer]. Care instructions adapted under license by [employer] (which disclaims liability or warranty for this information). If you have questions about a medical condition or this instruction, always ask your healthcare professional. [place] [address] any warranty or liability for your use of this information.

## 2019-10-23 ENCOUNTER — TELEPHONE (OUTPATIENT)
Dept: FAMILY MEDICINE CLINIC | Age: 44
End: 2019-10-23

## 2019-10-23 NOTE — TELEPHONE ENCOUNTER
Spoke with pt and advised he would need to be seen in office prior to medication approvals. Advised pt his LOV was 1/2018.  Pt verbalized understanding and scheduled for 10/25/19

## 2019-10-23 NOTE — TELEPHONE ENCOUNTER
----- Message from Laureano Puri sent at 10/21/2019  2:42 PM EDT -----  Regarding: FW: Dr. Lorrie Cheung: [phone]      ----- Message -----  From: Katerin Maria Del Carmen: 10/21/2019  12:47 PM EDT  To: [facility] Front Office  Subject: Dr. Jaspal Norman (if not patient):  N/A  Relationship of caller (if not patient): N/A  Best contact number(s): [phone]  Name of medication and dosage if known: Metformin, Blood pressure pills  Is patient out of this medication (yes/no): yes  Pharmacy name: [address]. Pharmacy listed in chart? (yes/no): yes  Pharmacy phone number: [phone]  Date of last visit:01/05/2018  Details to clarify the request: Pt stated out of medication. Pt has a appt scheduled but needs medication to hold him over until visit. Pt requesting a prescription be sent to pharmacy.

## 2019-12-04 ENCOUNTER — APPOINTMENT (OUTPATIENT)
Dept: GENERAL RADIOLOGY | Age: 44
End: 2019-12-04
Attending: EMERGENCY MEDICINE
Payer: MEDICAID

## 2019-12-04 ENCOUNTER — HOSPITAL ENCOUNTER (EMERGENCY)
Age: 44
Discharge: HOME | End: 2019-12-04
Attending: EMERGENCY MEDICINE
Payer: MEDICAID

## 2019-12-04 VITALS
DIASTOLIC BLOOD PRESSURE: 85 MMHG | RESPIRATION RATE: 14 BRPM | TEMPERATURE: 98.2 F | OXYGEN SATURATION: 98 % | WEIGHT: 299.61 LBS | BODY MASS INDEX: 45.55 KG/M2 | SYSTOLIC BLOOD PRESSURE: 130 MMHG | HEART RATE: 56 BPM

## 2019-12-04 DIAGNOSIS — R07.9 CHEST PAIN, UNSPECIFIED TYPE: Primary | ICD-10-CM

## 2019-12-04 DIAGNOSIS — R00.1 SINUS BRADYCARDIA: ICD-10-CM

## 2019-12-04 DIAGNOSIS — E11.65 TYPE 2 DIABETES MELLITUS WITH HYPERGLYCEMIA, WITHOUT LONG-TERM CURRENT USE OF INSULIN (HCC): ICD-10-CM

## 2019-12-04 DIAGNOSIS — Z91.14 NONCOMPLIANCE WITH MEDICATION REGIMEN: ICD-10-CM

## 2019-12-04 LAB
ALBUMIN SERPL-MCNC: 4 G/DL (ref 3.5–5)
ALBUMIN/GLOB SERPL: 1.1 {RATIO} (ref 1.1–2.2)
ALP SERPL-CCNC: 109 U/L (ref 45–117)
ALT SERPL-CCNC: 53 U/L (ref 12–78)
ANION GAP SERPL CALC-SCNC: 9 MMOL/L (ref 5–15)
AST SERPL-CCNC: 26 U/L (ref 15–37)
ATRIAL RATE: 250 BPM
ATRIAL RATE: 375 BPM
BASOPHILS # BLD: 0 K/UL (ref 0–0.1)
BASOPHILS NFR BLD: 1 % (ref 0–1)
BILIRUB SERPL-MCNC: 0.6 MG/DL (ref 0.2–1)
BUN SERPL-MCNC: 18 MG/DL (ref 6–20)
BUN/CREAT SERPL: 16 (ref 12–20)
CALCIUM SERPL-MCNC: 8.6 MG/DL (ref 8.5–10.1)
CALCULATED R AXIS, ECG10: -19 DEGREES
CALCULATED T AXIS, ECG11: 11 DEGREES
CALCULATED T AXIS, ECG11: 13 DEGREES
CHLORIDE SERPL-SCNC: 102 MMOL/L (ref 97–108)
CO2 SERPL-SCNC: 27 MMOL/L (ref 21–32)
COMMENT, HOLDF: NORMAL
CREAT SERPL-MCNC: 1.16 MG/DL (ref 0.7–1.3)
DIAGNOSIS, 93000: NORMAL
DIAGNOSIS, 93000: NORMAL
DIFFERENTIAL METHOD BLD: NORMAL
EOSINOPHIL # BLD: 0.1 K/UL (ref 0–0.4)
EOSINOPHIL NFR BLD: 3 % (ref 0–7)
ERYTHROCYTE [DISTWIDTH] IN BLOOD BY AUTOMATED COUNT: 12 % (ref 11.5–14.5)
GLOBULIN SER CALC-MCNC: 3.5 G/DL (ref 2–4)
GLUCOSE SERPL-MCNC: 112 MG/DL (ref 65–100)
HCT VFR BLD AUTO: 43.8 % (ref 36.6–50.3)
HGB BLD-MCNC: 14.9 G/DL (ref 12.1–17)
IMM GRANULOCYTES # BLD AUTO: 0 K/UL (ref 0–0.04)
IMM GRANULOCYTES NFR BLD AUTO: 0 % (ref 0–0.5)
LYMPHOCYTES # BLD: 1.1 K/UL (ref 0.8–3.5)
LYMPHOCYTES NFR BLD: 23 % (ref 12–49)
MCH RBC QN AUTO: 29.1 PG (ref 26–34)
MCHC RBC AUTO-ENTMCNC: 34 G/DL (ref 30–36.5)
MCV RBC AUTO: 85.5 FL (ref 80–99)
MONOCYTES # BLD: 0.4 K/UL (ref 0–1)
MONOCYTES NFR BLD: 9 % (ref 5–13)
NEUTS SEG # BLD: 3 K/UL (ref 1.8–8)
NEUTS SEG NFR BLD: 64 % (ref 32–75)
NRBC # BLD: 0 K/UL (ref 0–0.01)
NRBC BLD-RTO: 0 PER 100 WBC
PLATELET # BLD AUTO: 192 K/UL (ref 150–400)
PMV BLD AUTO: 9.7 FL (ref 8.9–12.9)
POTASSIUM SERPL-SCNC: 4.1 MMOL/L (ref 3.5–5.1)
PROT SERPL-MCNC: 7.5 G/DL (ref 6.4–8.2)
Q-T INTERVAL, ECG07: 426 MS
Q-T INTERVAL, ECG07: 480 MS
QRS DURATION, ECG06: 118 MS
QRS DURATION, ECG06: 98 MS
QTC CALCULATION (BEZET), ECG08: 421 MS
QTC CALCULATION (BEZET), ECG08: 442 MS
RBC # BLD AUTO: 5.12 M/UL (ref 4.1–5.7)
SAMPLES BEING HELD,HOLD: NORMAL
SODIUM SERPL-SCNC: 138 MMOL/L (ref 136–145)
TROPONIN I SERPL-MCNC: <0.05 NG/ML
TROPONIN I SERPL-MCNC: <0.05 NG/ML
VENTRICULAR RATE, ECG03: 51 BPM
VENTRICULAR RATE, ECG03: 59 BPM
WBC # BLD AUTO: 4.7 K/UL (ref 4.1–11.1)

## 2019-12-04 PROCEDURE — 74011250637 HC RX REV CODE- 250/637: Performed by: EMERGENCY MEDICINE

## 2019-12-04 PROCEDURE — 93005 ELECTROCARDIOGRAM TRACING: CPT

## 2019-12-04 PROCEDURE — 85025 COMPLETE CBC W/AUTO DIFF WBC: CPT

## 2019-12-04 PROCEDURE — 80053 COMPREHEN METABOLIC PANEL: CPT

## 2019-12-04 PROCEDURE — 99285 EMERGENCY DEPT VISIT HI MDM: CPT

## 2019-12-04 PROCEDURE — 84484 ASSAY OF TROPONIN QUANT: CPT

## 2019-12-04 PROCEDURE — 71046 X-RAY EXAM CHEST 2 VIEWS: CPT

## 2019-12-04 PROCEDURE — 36415 COLL VENOUS BLD VENIPUNCTURE: CPT

## 2019-12-04 RX ORDER — HYDROCODONE BITARTRATE AND ACETAMINOPHEN 5; 325 MG/1; MG/1
2 TABLET ORAL ONCE
Status: COMPLETED | OUTPATIENT
Start: 2019-12-04 | End: 2019-12-04

## 2019-12-04 RX ORDER — GUAIFENESIN 100 MG/5ML
81 LIQUID (ML) ORAL DAILY
Qty: 30 TAB | Refills: 0 | Status: SHIPPED | OUTPATIENT
Start: 2019-12-04

## 2019-12-04 RX ORDER — GUAIFENESIN 100 MG/5ML
325 LIQUID (ML) ORAL
Status: COMPLETED | OUTPATIENT
Start: 2019-12-04 | End: 2019-12-04

## 2019-12-04 RX ORDER — METFORMIN HYDROCHLORIDE 500 MG/1
500 TABLET ORAL 2 TIMES DAILY WITH MEALS
Qty: 60 TAB | Refills: 0 | Status: SHIPPED | OUTPATIENT
Start: 2019-12-04 | End: 2020-01-03

## 2019-12-04 RX ORDER — AMLODIPINE BESYLATE 5 MG/1
5 TABLET ORAL DAILY
Qty: 30 TAB | Refills: 0 | Status: SHIPPED | OUTPATIENT
Start: 2019-12-04 | End: 2020-01-03

## 2019-12-04 RX ADMIN — ASPIRIN 81 MG 324 MG: 81 TABLET ORAL at 10:23

## 2019-12-04 RX ADMIN — HYDROCODONE BITARTRATE AND ACETAMINOPHEN 2 TABLET: 5; 325 TABLET ORAL at 10:23

## 2019-12-04 NOTE — LETTER
NOTIFICATION RETURN TO WORK / SCHOOL 
 
12/4/2019 1:48 PM 
 
Mr. Jeff Carlos [address] [address] [facility] [address] To Whom It May Concern: 
 
Jeff Carlos is currently under the care of [facility]. He will return to work/school on: 12/6/19 If there are questions or concerns please have the patient contact our office. Sincerely, 
 
 
 
Becky Ring

## 2019-12-04 NOTE — ED TRIAGE NOTES
TRIAGE NOTE: At [facility] sudden onset of mid chest pain with radiation into LEFT arm. Pain described as pressure. At pain onset became short of breath and dizzy.       Hx of MI x 2

## 2019-12-04 NOTE — ED PROVIDER NOTES
21-year-old male with history of asthma, chronic pain, depression, diabetes, hypertension, and he tells me he has had silent strokes and 2 heart attacks in the past, is here with mid to left-sided chest pain that goes into his left arm that started while he was driving to work this morning. He has been out of his metformin and Norvasc for quite some time and has been under stress and not sleeping well at night. He says he only gets about 2 hours of sleep at night. He says he also feels slightly dizzy and occasionally sees some blue spots. He says this does not feel similar to when he had last.  He rates the pain as a 6 out of 10 and says it feels like someone pushing on his chest.  No nausea or vomiting. No cough. He says he was having some trouble breathing, but is better now. Past Medical History:   Diagnosis Date    Asthma     Bronchitis     Chronic pain     Depression     Diabetes (HCC)     type 2    Gastrointestinal disorder     GERD    Hypertension     MI (myocardial infarction) ([facility] [address]     x 2 per patient    Murmur     per patient    Otitis media     Sinus infection     Vertigo 3/4/2013       History reviewed. No pertinent surgical history.       Family History:   Problem Relation Age of Onset    Kidney Disease Mother     Hypertension Mother     Diabetes Mother     Cancer Mother         uterine    Elevated Lipids Mother     Coronary Artery Disease Father         premature    Hypertension Father     Diabetes Father     High Cholesterol Father     Cancer Father         skin    Elevated Lipids Father     Heart Disease Father     Cancer Sister         uterine    Hypertension Brother     High Cholesterol Brother     Coronary Artery Disease Paternal Uncle     Coronary Artery Disease Paternal Grandmother     Coronary Artery Disease Paternal Grandfather        Social History     Socioeconomic History    Marital status:      Spouse name: Not on file    Number of children: Not on file    Years of education: Not on file    Highest education level: Not on file   Occupational History    Not on file   Social Needs    Financial resource strain: Not on file    Food insecurity:     Worry: Not on file     Inability: Not on file    Transportation needs:     Medical: Not on file     Non-medical: Not on file   Tobacco Use    Smoking status: Former Smoker    Smokeless tobacco: Never Used    Tobacco comment: quit over 10 years   Substance and Sexual Activity    Alcohol use: Yes     Comment: rare    Drug use: No    Sexual activity: Yes     Partners: Female   Lifestyle    Physical activity:     Days per week: Not on file     Minutes per session: Not on file    Stress: Not on file   Relationships    Social connections:     Talks on phone: Not on file     Gets together: Not on file     Attends Restorationist service: Not on file     Active member of club or organization: Not on file     Attends meetings of clubs or organizations: Not on file     Relationship status: Not on file    Intimate partner violence:     Fear of current or ex partner: Not on file     Emotionally abused: Not on file     Physically abused: Not on file     Forced sexual activity: Not on file   Other Topics Concern    Not on file   Social History Narrative    Not on file         ALLERGIES: Patient has no known allergies. Review of Systems   Constitutional: Negative for fever. HENT: Negative for trouble swallowing. Eyes: Negative for visual disturbance. Respiratory: Negative for cough. Cardiovascular: Positive for chest pain. Negative for palpitations and leg swelling. Gastrointestinal: Negative for abdominal pain. Genitourinary: Negative for difficulty urinating. Musculoskeletal: Negative for gait problem. Skin: Negative for rash. Neurological: Negative for headaches. Hematological: Does not bruise/bleed easily. Psychiatric/Behavioral: Positive for sleep disturbance.        Vitals: 12/04/19 0859   BP: 131/84   Pulse: 67   Resp: 18   Temp: 98.1 °F (36.7 °C)   SpO2: 99%   Weight: 135.9 kg (299 lb 9.7 oz)            Physical Exam  Vitals signs and nursing note reviewed. Constitutional:       Appearance: He is well-developed. HENT:      Head: Normocephalic and atraumatic. Eyes:      Conjunctiva/sclera: Conjunctivae normal.   Neck:      Musculoskeletal: Normal range of motion. Cardiovascular:      Rate and Rhythm: Normal rate. Pulmonary:      Effort: Pulmonary effort is normal. No respiratory distress. Breath sounds: Normal breath sounds. Abdominal:      General: Bowel sounds are normal.      Palpations: Abdomen is soft. Tenderness: There is no tenderness. There is no guarding or rebound. Musculoskeletal: Normal range of motion. Skin:     General: Skin is warm and dry. Neurological:      Mental Status: He is alert and oriented to person, place, and time. Psychiatric:         Behavior: Behavior normal.          MDM  Number of Diagnoses or Management Options  Chest pain, unspecified type:   Noncompliance with medication regimen:   Sinus bradycardia:   Diagnosis management comments:     EKG at 9:00  Sinus bradycardia with normal axis at a rate of 59  MD, QRS, and QTC all within normal limits  No ST changes  T wave inversion lead III only  Computer reads this as a junctional rhythm, but I see P waves with normal MD    Story is at least moderately suspicious with the pressure, dizziness, and arm radiation. This gives him 1 point on the heart score. He gets 0 points for his age, 0 points for his initial troponin, and 2 points for his risk factors, which include blood pressure, diabetes, reports of coronary artery disease, and family history. All told he has a score of 3, which is still low risk. He has been seen multiple times for similar symptoms that were not necessarily thought to be stroke or heart attack.   I do not see evidence of heart attack at the moment, but I will repeat the troponin and the EKG to make sure we are not missing anything here today. I think he is at risk for ACS, so my plan will be to have him follow-up with his doctor and to see a cardiologist.      Repeat EKG is again sinus bradycardia with no ST changes  Repeat troponin negative  filled his prescriptions he had not been taking and I am going to discharge him with outpatient follow-up. If he feels worse he has been instructed to return to the emergency department immediately.          Procedures

## 2019-12-04 NOTE — DISCHARGE INSTRUCTIONS
Patient Education        Bradycardia: Care Instructions  Your Care Instructions    Bradycardia is a slow heart rate. A slow heart rate can be normal and healthy. Or it could be a sign of a problem with the heart's electrical system. If your heart beats too slowly, it may not supply your body with enough blood. This can make you weak or dizzy. Or it may make you pass out. Bradycardia can be caused by many things. This includes medicine, certain medical conditions, and changes in the heart that are the result of aging. How bradycardia is treated depends on what is causing it. Treatments include treating another health problem, changing a medicine, and getting a pacemaker. Treatment also depends on the symptoms. If bradycardia doesn't cause symptoms, it may not be treated. You and your doctor can decide what treatment is right for you. Follow-up care is a key part of your treatment and safety. Be sure to make and go to all appointments, and call your doctor if you are having problems. It's also a good idea to know your test results and keep a list of the medicines you take. How can you care for yourself at home? · Take your medicines exactly as prescribed. Call your doctor if you think you are having a problem with your medicine. If your bradycardia is caused by another disease, your doctor will try to treat the disease. If it is caused by heart medicines, he or she will adjust your medicines. · Make lifestyle changes to improve your heart health. ? Eat a heart-healthy diet that includes vegetables, fruits, nuts, beans, lean meat, fish, and whole grains. Limit alcohol, sodium, and sugar. ? Get regular exercise. Try for 30 minutes on most days of the week. If you do not have other heart problems, you likely do not have limits on the type or level of activity that you can do. You may want to walk, swim, bike, or do other activities.  Ask your doctor what level of exercise is safe for you.  ? Stay at a healthy weight. Lose weight if you need to.  ? Do not smoke. If you need help quitting, talk to your doctor about stop-smoking programs and medicines. These can increase your chances of quitting for good. ? Manage other health problems such as high blood pressure, high cholesterol, and diabetes. · If you get a pacemaker, you will get information about it. When should you call for help? Call 911 anytime you think you may need emergency care. For example, call if:    · You have symptoms of sudden heart failure. These may include:  ? Severe trouble breathing. ? A fast or irregular heartbeat. ? Coughing up pink, foamy mucus. ? You passed out.     · You have symptoms of a stroke. These may include:  ? Sudden numbness, tingling, weakness, or loss of movement in your face, arm, or leg, especially on only one side of your body. ? Sudden vision changes. ? Sudden trouble speaking. ? Sudden confusion or trouble understanding simple statements. ? Sudden problems with walking or balance. ? A sudden, severe headache that is different from past headaches.    Call your doctor now or seek immediate medical care if:    · You have new or changed symptoms of heart failure, such as:  ? New or increased shortness of breath. ? New or worse swelling in your legs, ankles, or feet. ? Sudden weight gain, such as more than 2 to 3 pounds in a day or 5 pounds in a week. (Your doctor may suggest a different range of weight gain.)  ? Feeling dizzy or lightheaded or like you may faint. ? Feeling so tired or weak that you cannot do your usual activities. ? Not sleeping well. Shortness of breath wakes you at night. You need extra pillows to prop yourself up to breathe easier.    Watch closely for changes in your health, and be sure to contact your doctor if:    · You do not get better as expected. Where can you learn more? Go to http://donald-liana.info/.   Enter D662 in the search box to learn more about \"Bradycardia: Care Instructions. \"  Current as of: April 9, 2019  Content Version: 12.2  © 7283-3228 [employer]. Care instructions adapted under license by [employer] (which disclaims liability or warranty for this information). If you have questions about a medical condition or this instruction, always ask your healthcare professional. [address] any warranty or liability for your use of this information. Patient Education        Chest Pain: Care Instructions  Your Care Instructions    There are many things that can cause chest pain. Some are not serious and will get better on their own in a few days. But some kinds of chest pain need more testing and treatment. Your doctor may have recommended a follow-up visit in the next 8 to 12 hours. If you are not getting better, you may need more tests or treatment. Even though your doctor has released you, you still need to watch for any problems. The doctor carefully checked you, but sometimes problems can develop later. If you have new symptoms or if your symptoms do not get better, get medical care right away. If you have worse or different chest pain or pressure that lasts more than 5 minutes or you passed out (lost consciousness), call 911 or seek other emergency help right away. A medical visit is only one step in your treatment. Even if you feel better, you still need to do what your doctor recommends, such as going to all suggested follow-up appointments and taking medicines exactly as directed. This will help you recover and help prevent future problems. How can you care for yourself at home? · Rest until you feel better. · Take your medicine exactly as prescribed. Call your doctor if you think you are having a problem with your medicine. · Do not drive after taking a prescription pain medicine. When should you call for help?   Call 911 if:    · You passed out (lost consciousness).     · You have severe difficulty breathing.     · You have symptoms of a heart attack. These may include:  ? Chest pain or pressure, or a strange feeling in your chest.  ? Sweating. ? Shortness of breath. ? Nausea or vomiting. ? Pain, pressure, or a strange feeling in your back, neck, jaw, or upper belly or in one or both shoulders or arms. ? Lightheadedness or sudden weakness. ? A fast or irregular heartbeat. After you call 911, the  may tell you to chew 1 adult-strength or 2 to 4 low-dose aspirin. Wait for an ambulance. Do not try to drive yourself.    Call your doctor today if:    · You have any trouble breathing.     · Your chest pain gets worse.     · You are dizzy or lightheaded, or you feel like you may faint.     · You are not getting better as expected.     · You are having new or different chest pain. Where can you learn more? Go to http://donald-liana.info/. Enter A120 in the search box to learn more about \"Chest Pain: Care Instructions. \"  Current as of: June 26, 2019  Content Version: 12.2  © 3480-1402 [employer]. Care instructions adapted under license by [employer] (which disclaims liability or warranty for this information). If you have questions about a medical condition or this instruction, always ask your healthcare professional. [address] any warranty or liability for your use of this information.

## 2019-12-06 ENCOUNTER — TELEPHONE (OUTPATIENT)
Dept: CARDIOLOGY CLINIC | Age: 44
End: 2019-12-06

## 2019-12-06 NOTE — TELEPHONE ENCOUNTER
Patient referred from ER for chest pain.  Will ask PSR to schedule first available new patient appointment with any provider in our practice.

## 2023-04-13 NOTE — ED NOTES
Pt stated he feels like the room is spinning. H/o vertigo. Number Of Freeze-Thaw Cycles: 1 freeze-thaw cycle Consent: The patient's consent was obtained including but not limited to risks of crusting, scabbing, blistering, scarring, darker or lighter pigmentary change, recurrence, incomplete removal and infection. Show Aperture Variable?: Yes Render Note In Bullet Format When Appropriate: No Post-Care Instructions: I reviewed with the patient in detail post-care instructions. Patient is to wear sunprotection, and avoid picking at any of the treated lesions. Pt may apply Vaseline to crusted or scabbing areas. Detail Level: Detailed Duration Of Freeze Thaw-Cycle (Seconds): 3